# Patient Record
Sex: MALE | Race: WHITE | HISPANIC OR LATINO | Employment: FULL TIME | URBAN - METROPOLITAN AREA
[De-identification: names, ages, dates, MRNs, and addresses within clinical notes are randomized per-mention and may not be internally consistent; named-entity substitution may affect disease eponyms.]

---

## 2015-01-30 LAB
CHOLEST SERPL-MCNC: 183 MG/DL (ref 0–199)
HDLC SERPL-MCNC: 29 MG/DL
LDLC SERPL CALC-MCNC: 110 MG/DL (ref 0–129)
NONHDLC SERPL-MCNC: 154 MG/DL
TRIGL SERPL-MCNC: 218 MG/DL (ref 0–149)

## 2015-07-09 LAB — HBA1C MFR BLD: 6.9 % (ref 4.3–5.6)

## 2017-01-01 ENCOUNTER — TRANSFERRED RECORDS (OUTPATIENT)
Dept: HEALTH INFORMATION MANAGEMENT | Facility: CLINIC | Age: 32
End: 2017-01-01

## 2017-01-31 ENCOUNTER — OFFICE VISIT (OUTPATIENT)
Dept: FAMILY MEDICINE | Facility: CLINIC | Age: 32
End: 2017-01-31
Payer: COMMERCIAL

## 2017-01-31 VITALS
TEMPERATURE: 101.3 F | HEIGHT: 71 IN | BODY MASS INDEX: 29.54 KG/M2 | OXYGEN SATURATION: 96 % | WEIGHT: 211 LBS | DIASTOLIC BLOOD PRESSURE: 87 MMHG | SYSTOLIC BLOOD PRESSURE: 138 MMHG | HEART RATE: 122 BPM

## 2017-01-31 DIAGNOSIS — J45.30 MILD PERSISTENT ASTHMA WITHOUT COMPLICATION: ICD-10-CM

## 2017-01-31 DIAGNOSIS — J02.0 ACUTE STREPTOCOCCAL PHARYNGITIS: Primary | ICD-10-CM

## 2017-01-31 DIAGNOSIS — R07.0 THROAT PAIN: ICD-10-CM

## 2017-01-31 LAB
DEPRECATED S PYO AG THROAT QL EIA: ABNORMAL
MICRO REPORT STATUS: ABNORMAL
SPECIMEN SOURCE: ABNORMAL

## 2017-01-31 PROCEDURE — 99213 OFFICE O/P EST LOW 20 MIN: CPT | Performed by: PREVENTIVE MEDICINE

## 2017-01-31 PROCEDURE — 87880 STREP A ASSAY W/OPTIC: CPT | Performed by: PREVENTIVE MEDICINE

## 2017-01-31 RX ORDER — AMOXICILLIN 875 MG
875 TABLET ORAL 2 TIMES DAILY
Qty: 20 TABLET | Refills: 0 | Status: SHIPPED | OUTPATIENT
Start: 2017-01-31 | End: 2017-03-20

## 2017-01-31 RX ORDER — MONTELUKAST SODIUM 10 MG/1
10 TABLET ORAL AT BEDTIME
Qty: 90 TABLET | Refills: 1 | Status: SHIPPED | OUTPATIENT
Start: 2017-01-31 | End: 2018-06-01

## 2017-01-31 RX ORDER — ALBUTEROL SULFATE 90 UG/1
2 AEROSOL, METERED RESPIRATORY (INHALATION) EVERY 6 HOURS PRN
Qty: 1 INHALER | Refills: 3 | Status: SHIPPED | OUTPATIENT
Start: 2017-01-31 | End: 2017-07-30

## 2017-01-31 ASSESSMENT — PAIN SCALES - GENERAL: PAINLEVEL: SEVERE PAIN (7)

## 2017-01-31 NOTE — MR AVS SNAPSHOT
After Visit Summary   1/31/2017    Tio Merino    MRN: [id number]           Patient Information     Date Of Birth          1985        Visit Information        Provider Department      1/31/2017 1:20 PM Shruthi Sanchez MD [facility]        Today's Diagnoses     Acute streptococcal pharyngitis    -  1     Throat pain         Mild persistent asthma without complication           Care Instructions      Pharyngitis: Strep (Confirmed)     You have had a positive test for strep throat. Strep throat is a contagious illness. It is spread by coughing, kissing or by touching others after touching your mouth or nose. Symptoms include throat pain which is worse with swallowing, aching all over, headache and fever. It is treated with antibiotic medication. This should help you start to feel better within 1-2 days.  Home care    Rest at home. Drink plenty of fluids to avoid dehydration.    No work or school for the first 2 days of taking the antibiotics. After this time, you will not be contagious. You can then return to school or work if you are feeling better.     The antibiotic medication must be taken for the full 10 days, even if you feel better. This is very important to ensure the infection is treated. It is also important to prevent drug-resistent organisms from developing. If you were given an antibiotic shot, no more antibiotics are needed.    You may use acetaminophen (Tylenol) or ibuprofen (Motrin, Advil) to control pain or fever, unless another medicine was prescribed for this. (NOTE: If you have chronic liver or kidney disease or ever had a stomach ulcer or GI bleeding, talk with your doctor before using these medicines.)    Throat lozenges or sprays (such as Chloraseptic) help reduce pain. Gargling with warm salt water will also reduce throat pain. Dissolve 1/2 teaspoon of salt in 1 glass of warm water. This may be useful just before meals.     Soft foods are okay. Avoid  salty or spicy foods.  Follow-up care  Follow up with your healthcare provider or our staff if you are not improving over the next week.  When to seek medical advice  Call your healthcare provider right away if any of these occur:    Fever as directed by your doctor     New or worsening ear pain, sinus pain, or headache    Painful lumps in the back of neck    Stiff neck    Lymph nodes are getting larger or becoming soft in the middle    Inability to swallow liquids, excessive drooling, or inability to open mouth wide due to throat pain    Signs of dehydration (very dark urine or no urine, sunken eyes, dizziness)    Trouble breathing or noisy breathing    Muffled voice    New rash    7469-8686 [employer]. [address] [place] [address]. All rights reserved. This information is not intended as a substitute for professional medical care. Always follow your healthcare professional's instructions.              Follow-ups after your visit        Who to contact     If you have questions or need follow up information about today's clinic visit or your schedule please contact [facility] directly at [phone].  Normal or non-critical lab and imaging results will be communicated to you by Pelagohart, letter or phone within 4 business days after the clinic has received the results. If you do not hear from us within 7 days, please contact the clinic through INVERMARTt or phone. If you have a critical or abnormal lab result, we will notify you by phone as soon as possible.  Submit refill requests through RealTravel or call your pharmacy and they will forward the refill request to us. Please allow 3 business days for your refill to be completed.          Additional Information About Your Visit        RealTravel Information     RealTravel lets you send messages to your doctor, view your test results, renew your prescriptions, schedule appointments and more. To sign up, go to  [email or web address] . Click on \"Log in\" on the left side of the screen, which will take you to the Welcome page. Then click on \"Sign up Now\" on the right side of the page.     You will be asked to enter the access code listed below, as well as some personal information. Please follow the directions to create your username and password.     Your access code is: [email or web address]  Expires: 2017  1:41 PM     Your access code will  in 90 days. If you need help or a new code, please call your [place] clinic or [phone].        Care EveryWhere ID     This is your Care EveryWhere ID. This could be used by other organizations to access your [place] medical records  [id number]        Your Vitals Were     Pulse Temperature Height BMI (Body Mass Index) Pulse Oximetry       122 101.3  F (38.5  C) (Oral) 5' 10.5\" (1.791 m) 29.84 kg/m2 96%        Blood Pressure from Last 3 Encounters:   17 138/87   12 137/81    Weight from Last 3 Encounters:   17 211 lb (95.709 kg)   12 210 lb (95.255 kg)              We Performed the Following     Strep, Rapid Screen          Today's Medication Changes          These changes are accurate as of: 17  1:41 PM.  If you have any questions, ask your nurse or doctor.               Start taking these medicines.        Dose/Directions    albuterol 108 (90 BASE) MCG/ACT Inhaler   Commonly known as:  PROAIR HFA/PROVENTIL HFA/VENTOLIN HFA   Used for:  Mild persistent asthma without complication   Started by:  Shruthi Sanchez MD        Dose:  2 puff   Inhale 2 puffs into the lungs every 6 hours as needed for shortness of breath / dyspnea or wheezing   Quantity:  1 Inhaler   Refills:  3       amoxicillin 875 MG tablet   Commonly known as:  AMOXIL   Used for:  Acute streptococcal pharyngitis   Started by:  Shruthi Sanchez MD        Dose:  875 mg   Take 1 tablet (875 mg) by mouth 2 times daily   Quantity:  20 tablet   Refills:  0       montelukast 10 MG tablet "   Commonly known as:  SINGULAIR   Used for:  Mild persistent asthma without complication   Started by:  Shruthi Sanchez MD        Dose:  10 mg   Take 1 tablet (10 mg) by mouth At Bedtime   Quantity:  90 tablet   Refills:  1         These medicines have changed or have updated prescriptions.        Dose/Directions    mometasone-formoterol 100-5 MCG/ACT oral inhaler   Commonly known as:  DULERA   This may have changed:  additional instructions   Used for:  Mild persistent asthma without complication   Changed by:  Shruthi Sanchez MD        Dose:  2 puff   Inhale 2 puffs into the lungs 2 times daily   Quantity:  1 Inhaler   Refills:  3            Where to get your medicines      These medications were sent to [id number] - [facility] [address] [facility]  [address] [facility] [address]    Hours:  24-hours Phone:  [phone]    - albuterol 108 (90 BASE) MCG/ACT Inhaler  - amoxicillin 875 MG tablet  - mometasone-formoterol 100-5 MCG/ACT oral inhaler  - montelukast 10 MG tablet             Primary Care Provider Office Phone # Fax #    Aguilar Carroll [phone]       [facility] [address] [facility] [address]        Thank you!     Thank you for choosing [facility]  for your care. Our goal is always to provide you with excellent care. Hearing back from our patients is one way we can continue to improve our services. Please take a few minutes to complete the written survey that you may receive in the mail after your visit with us. Thank you!             Your Updated Medication List - Protect others around you: Learn how to safely use, store and throw away your medicines at www.disposemymeds.org.          This list is accurate as of: 1/31/17  1:41 PM.  Always use your most recent med list.                   Brand Name Dispense Instructions for use    albuterol 108 (90 BASE) MCG/ACT Inhaler     PROAIR HFA/PROVENTIL HFA/VENTOLIN HFA    1 Inhaler    Inhale 2 puffs into the lungs every 6 hours as needed for shortness of breath / dyspnea or wheezing       albuterol 90 MCG/ACT inhaler      Inhale 2 puffs into the lungs every 4 hours as needed. for asthma symptoms.       amoxicillin 875 MG tablet    AMOXIL    20 tablet    Take 1 tablet (875 mg) by mouth 2 times daily       mometasone-formoterol 100-5 MCG/ACT oral inhaler    DULERA    1 Inhaler    Inhale 2 puffs into the lungs 2 times daily       montelukast 10 MG tablet    SINGULAIR    90 tablet    Take 1 tablet (10 mg) by mouth At Bedtime

## 2017-01-31 NOTE — Clinical Note
[address] [address]  [phone]  Dept: [phone]      1/31/2017    Re: Tio Merino      TO WHOM IT MAY CONCERN:    Tio Merino  was seen on 1/31/17.  Please excuse him  until 2/2/17 due to illness.    Cordially,          Shruthi Sanchez MD MPH    [facility]

## 2017-01-31 NOTE — PROGRESS NOTES
SUBJECTIVE:                                                    Tio Merino is a 31 year old male who presents to clinic today for the following health issues:  I have reviewed and agree with the documentation by the MA. I updated the history as indicated.  Shruthi Sanchez MD MPH      RESPIRATORY SYMPTOMS      Duration: x last night    Description  sore throat, fever, chills, headache, fatigue/malaise, hoarse voice, myalgias and nausea    Severity: severe    Accompanying signs and symptoms: None    History (predisposing factors):  none    Precipitating or alleviating factors: None    Therapies tried and outcome:  OTC flu   No rash, no diarrhea and no abdominal pain.  Works in a     Problem list and histories reviewed & adjusted, as indicated.  Additional history: as documented    Patient Active Problem List   Diagnosis     CARDIOVASCULAR SCREENING; LDL GOAL LESS THAN 160     Mild persistent asthma     History reviewed. No pertinent past surgical history.    Social History   Substance Use Topics     Smoking status: Never Smoker      Smokeless tobacco: Never Used     Alcohol Use: Yes     Family History   Problem Relation Age of Onset     DIABETES Maternal Grandfather          Current Outpatient Prescriptions   Medication Sig Dispense Refill     mometasone-formoterol (DULERA) 100-5 MCG/ACT oral inhaler Inhale 2 puffs into the lungs 2 times daily 1 Inhaler 3     montelukast (SINGULAIR) 10 MG tablet Take 1 tablet (10 mg) by mouth At Bedtime 90 tablet 1     albuterol (PROAIR HFA/PROVENTIL HFA/VENTOLIN HFA) 108 (90 BASE) MCG/ACT Inhaler Inhale 2 puffs into the lungs every 6 hours as needed for shortness of breath / dyspnea or wheezing 1 Inhaler 3     amoxicillin (AMOXIL) 875 MG tablet Take 1 tablet (875 mg) by mouth 2 times daily 20 tablet 0     albuterol 90 MCG/ACT inhaler Inhale 2 puffs into the lungs every 4 hours as needed. for asthma symptoms.       [DISCONTINUED] mometasone furo-formoterol fum (DULERA)  "100-5 MCG/ACT AERO Inhale 2 puffs into the lungs 2 times daily. for asthma prevention. Rinse mouth after use. 1 Inhaler 2     No Known Allergies  BP Readings from Last 3 Encounters:   01/31/17 138/87   06/18/12 137/81    Wt Readings from Last 3 Encounters:   01/31/17 211 lb (95.709 kg)   06/18/12 210 lb (95.255 kg)                    ROS:  Constitutional, HEENT, cardiovascular, pulmonary, gi and gu systems are negative, except as otherwise noted.    OBJECTIVE:                                                    /87 mmHg  Pulse 122  Temp(Src) 101.3  F (38.5  C) (Oral)  Ht 5' 10.5\" (1.791 m)  Wt 211 lb (95.709 kg)  BMI 29.84 kg/m2  SpO2 96%  Body mass index is 29.84 kg/(m^2).  GENERAL APPEARANCE: healthy, alert and no distress  EYES: Eyes grossly normal to inspection and conjunctivae and sclerae normal  HENT: ear canals and TM's normal, nose and mouth without ulcers or lesions, erythema of pharynx+, no exudates or pus points, no uvular deviation   NECK: Anterior cervical chain adenopathy+  RESP: lungs clear to auscultation - no rales, rhonchi or wheezes  CV: regular rates and rhythm, normal S1 S2, no S3 or S4 and no murmur, click or rub  ABDOMEN: soft, nontender, without hepatosplenomegaly or masses  SKIN: no suspicious lesions or rashes  NEURO: Normal strength and tone, mentation intact and speech normal  PSYCH: mentation appears normal    Diagnostic test results:  Diagnostic Test Results:  Results for orders placed or performed in visit on 01/31/17 (from the past 24 hour(s))   Strep, Rapid Screen   Result Value Ref Range    Specimen Description Throat     Rapid Strep A Screen (A)      POSITIVE: Group A Streptococcal antigen detected by immunoassay.    Micro Report Status FINAL 01/31/2017         ASSESSMENT/PLAN:                                                    1. Acute streptococcal pharyngitis  - amoxicillin (AMOXIL) 875 MG tablet; Take 1 tablet (875 mg) by mouth 2 times daily  Dispense: 20 tablet; " "Refill: 0  -Hand washing  -Work note provided   -Home care information provided  -Hydration and monitor temperature  -Saline gargles  -tylenol or Ibuprofen as needed   -Contagious nature discussed   -ER precautions, if drooling, persistent fever then needs to be seen in ER due to risk of sudarshan tonsillar abscess.   -Complete full course of antibiotics  -Will not be contagious after 24 hours of antibiotics       2. Throat pain  - Strep, Rapid Screen    3. Mild persistent asthma without complication  - mometasone-formoterol (DULERA) 100-5 MCG/ACT oral inhaler; Inhale 2 puffs into the lungs 2 times daily  Dispense: 1 Inhaler; Refill: 3  - montelukast (SINGULAIR) 10 MG tablet; Take 1 tablet (10 mg) by mouth At Bedtime  Dispense: 90 tablet; Refill: 1  - albuterol (PROAIR HFA/PROVENTIL HFA/VENTOLIN HFA) 108 (90 BASE) MCG/ACT Inhaler; Inhale 2 puffs into the lungs every 6 hours as needed for shortness of breath / dyspnea or wheezing  Dispense: 1 Inhaler; Refill: 3  -ACT score today is 11, per patient \"score is always like this\"  -Added inhaled steroid and Singulair  -Recheck ACT by phone in 4 weeks     I ended our visit today by discussing the patient's diagnoses and recommended treatment. Please refer to today's diagnoses and orders for further details. I briefly discussed the pathophysiology of these conditions and outlined their expected course. I discussed the warning symptoms and signs that indicate an atypical course that would need urgent or emergent care. I also discussed self care strategies for symptom relief.  Patient voiced complete understanding of plan of care and was in full agreement to proceed. After visit summary discussed and handed to patient.    Common side effects of medications prescribed at this visit were discussed with the patient. Severe side effects, including current applicable black box warnings, were discussed.   Follow up with Provider - If sore throat not improving in 48 hours.      Shruthi " MD Daniel MPH    [facility]

## 2017-01-31 NOTE — PATIENT INSTRUCTIONS
Pharyngitis: Strep (Confirmed)     You have had a positive test for strep throat. Strep throat is a contagious illness. It is spread by coughing, kissing or by touching others after touching your mouth or nose. Symptoms include throat pain which is worse with swallowing, aching all over, headache and fever. It is treated with antibiotic medication. This should help you start to feel better within 1-2 days.  Home care    Rest at home. Drink plenty of fluids to avoid dehydration.    No work or school for the first 2 days of taking the antibiotics. After this time, you will not be contagious. You can then return to school or work if you are feeling better.     The antibiotic medication must be taken for the full 10 days, even if you feel better. This is very important to ensure the infection is treated. It is also important to prevent drug-resistent organisms from developing. If you were given an antibiotic shot, no more antibiotics are needed.    You may use acetaminophen (Tylenol) or ibuprofen (Motrin, Advil) to control pain or fever, unless another medicine was prescribed for this. (NOTE: If you have chronic liver or kidney disease or ever had a stomach ulcer or GI bleeding, talk with your doctor before using these medicines.)    Throat lozenges or sprays (such as Chloraseptic) help reduce pain. Gargling with warm salt water will also reduce throat pain. Dissolve 1/2 teaspoon of salt in 1 glass of warm water. This may be useful just before meals.     Soft foods are okay. Avoid salty or spicy foods.  Follow-up care  Follow up with your healthcare provider or our staff if you are not improving over the next week.  When to seek medical advice  Call your healthcare provider right away if any of these occur:    Fever as directed by your doctor     New or worsening ear pain, sinus pain, or headache    Painful lumps in the back of neck    Stiff neck    Lymph nodes are getting larger or becoming soft in the  middle    Inability to swallow liquids, excessive drooling, or inability to open mouth wide due to throat pain    Signs of dehydration (very dark urine or no urine, sunken eyes, dizziness)    Trouble breathing or noisy breathing    Muffled voice    New rash    [id number] [employer]. [address] [place] [address]. All rights reserved. This information is not intended as a substitute for professional medical care. Always follow your healthcare professional's instructions.

## 2017-01-31 NOTE — NURSING NOTE
"Chief Complaint   Patient presents with     Pharyngitis       Initial /87 mmHg  Pulse 122  Temp(Src) 101.3  F (38.5  C) (Oral)  Ht 5' 10.5\" (1.791 m)  Wt 211 lb (95.709 kg)  BMI 29.84 kg/m2  SpO2 96% Estimated body mass index is 29.84 kg/(m^2) as calculated from the following:    Height as of this encounter: 5' 10.5\" (1.791 m).    Weight as of this encounter: 211 lb (95.709 kg).  BP completed using cuff size: brielle Delcid MA        "

## 2017-01-31 NOTE — PROGRESS NOTES
Quick Note:    Results discussed directly with patient while patient was present. Any further details documented in the note.   Shruthi Sanchez MD  ______

## 2017-02-01 ASSESSMENT — ASTHMA QUESTIONNAIRES: ACT_TOTALSCORE: 11

## 2017-02-07 ENCOUNTER — TELEPHONE (OUTPATIENT)
Dept: FAMILY MEDICINE | Facility: CLINIC | Age: 32
End: 2017-02-07

## 2017-02-07 DIAGNOSIS — J45.30 MILD PERSISTENT ASTHMA: Primary | ICD-10-CM

## 2017-02-07 NOTE — Clinical Note
My Asthma Action Plan  Name: Tio Merino   YOB: 1985  Date: 2/7/2017   My doctor: Aguilar Carroll   My clinic: [facility]      My Control Medicine: Mometasone+formoterol (Dulera) -  100/5 mcg  Montelukast (Singulair) -  10 mg        Dose: once daily  My Rescue Medicine: Albuterol (Proair/Ventolin/Proventil) HFA        Dose: Inhale 2 puffs into the lungs every 6 hours as needed for shortness of breath / dyspnea or wheezing   My Asthma Severity: mild persistent  Avoid your asthma triggers: please see the second sheet.        GREEN ZONE   Good Control    I feel good    No cough or wheeze    Can work, sleep and play without asthma symptoms       Take your asthma control medicine every day.     1. If exercise triggers your asthma, take your rescue medication    15 minutes before exercise or sports, and    During exercise if you have asthma symptoms  2. Spacer to use with inhaler: If you have a spacer, make sure to use it with your inhaler             YELLOW ZONE Getting Worse  I have ANY of these:    I do not feel good    Cough or wheeze    Chest feels tight    Wake up at night   1. Keep taking your Green Zone medications  2. Start taking your rescue medicine:    every 20 minutes for up to 1 hour. Then every 4 hours for 24-48 hours.  3. If you stay in the Yellow Zone for more than 12-24 hours, contact your doctor.  4. If you do not return to the Green Zone in 12-24 hours or you get worse, start taking your oral steroid medicine if prescribed by your provider.           RED ZONE Medical Alert - Get Help  I have ANY of these:    I feel awful    Medicine is not helping    Breathing getting harder    Trouble walking or talking    Nose opens wide to breathe       1. Take your rescue medicine NOW  2. If your provider has prescribed an oral steroid medicine, start taking it NOW  3. Call your doctor NOW  4. If you are still in the Red Zone after 20 minutes and you have not reached your  doctor:    Take your rescue medicine again and    Call 911 or go to the emergency room right away    See your regular doctor within 2 weeks of an Emergency Room or Urgent Care visit for follow-up treatment.        The above medication may be given at school or day care?:  N/A (Adult Patient)  Child can carry and use inhaler(s) at school with approval of school nurse?: N/A (Adult Patient)    Electronically signed by: Danyell Chung, February 7, 2017    Annual Reminders:  Meet with Asthma Educator,  Flu Shot in the Fall, consider Pneumonia Vaccination for patients with asthma (aged 19 and older).    Pharmacy:    [id number] - [facility], [place] - [address] [id number] - [place] [[place]], [place] - [address] [facility] [address] [address] [facility] [address] [facility] [address] [facility] [address] [facility]                    Asthma Triggers  How To Control Things That Make Your Asthma Worse    Triggers are things that make your asthma worse.  Look at the list below to help you find your triggers and what you can do about them.  You can help prevent asthma flare-ups by staying away from your triggers.      Trigger                                                          What you can do   Cigarette Smoke  Tobacco smoke can make asthma worse. Do not allow smoking in your home, car or around you.  Be sure no one smokes at a child s day care or school.  If you smoke, ask your health care provider for ways to help you quit.  Ask family members to quit too.  Ask your health care provider for a referral to Quit Plan to help you quit smoking, or call 8-130-362-PLAN.     Colds, Flu, Bronchitis  These are common triggers of asthma. Wash your hands often.  Don t touch your eyes, nose or mouth.  Get a flu shot every year.     Dust Mites  These are tiny bugs that live in cloth or carpet. They are too small to see. Wash sheets and blankets in hot water every week.   Encase pillows and mattress in  dust mite proof covers.  Avoid having carpet if you can. If you have carpet, vacuum weekly.   Use a dust mask and HEPA vacuum.   Pollen and Outdoor Mold  Some people are allergic to trees, grass, or weed pollen, or molds. Try to keep your windows closed.  Limit time out doors when pollen count is high.   Ask you health care provider about taking medicine during allergy season.     Animal Dander  Some people are allergic to skin flakes, urine or saliva from pets with fur or feathers. Keep pets with fur or feathers out of your home.    If you can t keep the pet outdoors, then keep the pet out of your bedroom.  Keep the bedroom door closed.  Keep pets off cloth furniture and away from stuffed toys.     Mice, Rats, and Cockroaches  Some people are allergic to the waste from these pests.   Cover food and garbage.  Clean up spills and food crumbs.  Store grease in the refrigerator.   Keep food out of the bedroom.   Indoor Mold  This can be a trigger if your home has high moisture. Fix leaking faucets, pipes, or other sources of water.   Clean moldy surfaces.  Dehumidify basement if it is damp and smelly.   Smoke, Strong Odors, and Sprays  These can reduce air quality. Stay away from strong odors and sprays, such as perfume, powder, hair spray, paints, smoke incense, paint, cleaning products, candles and new carpet.   Exercise or Sports  Some people with asthma have this trigger. Be active!  Ask your doctor about taking medicine before sports or exercise to prevent symptoms.    Warm up for 5-10 minutes before and after sports or exercise.     Other Triggers of Asthma  Cold air:  Cover your nose and mouth with a scarf.  Sometimes laughing or crying can be a trigger.  Some medicines and food can trigger asthma.

## 2017-02-07 NOTE — TELEPHONE ENCOUNTER
Order place and asthma action plan completed and mailed to the patient.    Danyell Chung RN, [facility]

## 2017-02-28 ENCOUNTER — TELEPHONE (OUTPATIENT)
Dept: FAMILY MEDICINE | Facility: CLINIC | Age: 32
End: 2017-02-28

## 2017-03-03 NOTE — TELEPHONE ENCOUNTER
Left message on voicemail to call clinic, unable to reach x 3 so encounter closed.  Farhana MILLER

## 2017-03-20 ENCOUNTER — OFFICE VISIT (OUTPATIENT)
Dept: FAMILY MEDICINE | Facility: CLINIC | Age: 32
End: 2017-03-20
Payer: COMMERCIAL

## 2017-03-20 VITALS
TEMPERATURE: 98 F | WEIGHT: 214.7 LBS | DIASTOLIC BLOOD PRESSURE: 86 MMHG | HEART RATE: 67 BPM | OXYGEN SATURATION: 97 % | SYSTOLIC BLOOD PRESSURE: 130 MMHG | BODY MASS INDEX: 30.37 KG/M2

## 2017-03-20 DIAGNOSIS — K62.5 RECTAL BLEEDING: ICD-10-CM

## 2017-03-20 DIAGNOSIS — K64.8 HEMORRHOIDS, INTERNAL: Primary | ICD-10-CM

## 2017-03-20 PROCEDURE — 99213 OFFICE O/P EST LOW 20 MIN: CPT | Performed by: FAMILY MEDICINE

## 2017-03-20 RX ORDER — HYDROCORTISONE ACETATE 25 MG/1
25 SUPPOSITORY RECTAL 2 TIMES DAILY
Qty: 28 SUPPOSITORY | Refills: 0 | Status: SHIPPED | OUTPATIENT
Start: 2017-03-20 | End: 2017-03-22

## 2017-03-20 NOTE — NURSING NOTE
"Chief Complaint   Patient presents with     Bowel Problems     x 1 month       Initial BP (!) 146/92 (BP Location: Right arm, Cuff Size: Adult Large)  Pulse 67  Temp 98  F (36.7  C) (Oral)  Wt 97.4 kg (214 lb 11.2 oz)  SpO2 97%  BMI 30.37 kg/m2 Estimated body mass index is 30.37 kg/(m^2) as calculated from the following:    Height as of 1/31/17: 1.791 m (5' 10.5\").    Weight as of this encounter: 97.4 kg (214 lb 11.2 oz).  Medication Reconciliation: complete       Hemal Reynaga CMA      "

## 2017-03-20 NOTE — PROGRESS NOTES
SUBJECTIVE:                                                    Tio Merino is a 31 year old male who presents to clinic today for the following health issues:      Bowel Issues     Onset: 1 month    Description:   Frequency of bowel movements: 3 per day.  Stool consistency: soft formed    Progression of Symptoms:  worsening    Accompanying Signs & Symptoms:  Abdominal pain (cramping?): no pain or cramping but feeling bloated and uncomfortable   Blood in stool: YES and mucous - combo.    Rectal pain: no   Nausea/vomiting: no   Weight loss or gain: no    History:   History of abdominal surgery: no     Precipitating factors:   Recent use of narcotics, anticholinergics, calcium channel blockers, antacids, or iron supplements: no   Chronic Laxative Use: no          Therapies Tried and outcome: none    Will have sense of needing to pass stool but nothing there.    Seems like blood is on the surface of stool.  No pain with passing BM.  No incontinence.          Problem list and histories reviewed & adjusted, as indicated.  Additional history: as documented    BP Readings from Last 3 Encounters:   03/20/17 (!) 146/92   01/31/17 138/87   06/18/12 137/81    Wt Readings from Last 3 Encounters:   03/20/17 97.4 kg (214 lb 11.2 oz)   01/31/17 95.7 kg (211 lb)   06/18/12 95.3 kg (210 lb)                    Reviewed and updated as needed this visit by clinical staff  Tobacco  Allergies  Meds  Med Hx  Surg Hx  Fam Hx  Soc Hx      Reviewed and updated as needed this visit by Provider  Tobacco  Allergies  Meds  Med Hx  Surg Hx  Fam Hx  Soc Hx        ROS:  Constitutional, HEENT, cardiovascular, pulmonary, gi and gu systems are negative, except as otherwise noted.    OBJECTIVE:                                                    /86  Pulse 67  Temp 98  F (36.7  C) (Oral)  Wt 97.4 kg (214 lb 11.2 oz)  SpO2 97%  BMI 30.37 kg/m2  Body mass index is 30.37 kg/(m^2).  GENERAL: alert and no distress  NECK: no  adenopathy, no asymmetry, masses, or scars and thyroid normal to palpation  RESP: lungs clear to auscultation - no rales, rhonchi or wheezes  CV: regular rate and rhythm, normal S1 S2, no S3 or S4, no murmur, click or rub, no peripheral edema and peripheral pulses strong  ABDOMEN: soft, nontender, no hepatosplenomegaly, no masses and bowel sounds normal  RECTAL (male): normal sphincter tone, no rectal masses, prostate of normal size for age, smooth, nontender without masses/nodules and internal hemorrhoid viewed with anoscope         ASSESSMENT/PLAN:                                                            1. Hemorrhoids, internal  Formulary issues with the suppositories - anusol-HC 2.5% cream.  Twice a day use with applicator     2. Rectal bleeding  I would recommend colonoscopy due to the recurrent nature of the bleeding to verify no other cause for these symptoms.  - GASTROENTEROLOGY ADULT REF PROCEDURE ONLY    Patient Instructions   Begin Anucort suppository in rectum twice a day for 2 weeks.  Referral for colonoscopy evaluation due to recurrent and prolonged symptoms    Return to clinic in 1-3 weeks for fasting labs, follow up blood sugar, cholesterol and blood pressure.        Bridget Mohr MD  [facility]

## 2017-03-20 NOTE — PATIENT INSTRUCTIONS
Begin Anucort suppository in rectum twice a day for 2 weeks.  Referral for colonoscopy evaluation due to recurrent and prolonged symptoms    Return to clinic in 1-3 weeks for fasting labs, follow up blood sugar, cholesterol and blood pressure.

## 2017-03-20 NOTE — MR AVS SNAPSHOT
After Visit Summary   3/20/2017    Tio Merino    MRN: [id number]           Patient Information     Date Of Birth          1985        Visit Information        Provider Department      3/20/2017 5:20 PM Bridget Mohr MD [facility]        Today's Diagnoses     Hemorrhoids, internal    -  1    Rectal bleeding          Care Instructions    Begin Anucort suppository in rectum twice a day for 2 weeks.  Referral for colonoscopy evaluation due to recurrent and prolonged symptoms    Return to clinic in 1-3 weeks for fasting labs, follow up blood sugar, cholesterol and blood pressure.          Follow-ups after your visit        Additional Services     GASTROENTEROLOGY ADULT REF PROCEDURE ONLY       Last Lab Result: No results found for: CR  Body mass index is 30.37 kg/(m^2).     Needed:  No  Language:  English    Patient will be contacted to schedule procedure.     Please be aware that coverage of these services is subject to the terms and limitations of your health insurance plan.  Call member services at your health plan with any benefit or coverage questions.  Any procedures must be performed at a [facility] facility OR coordinated by your clinic's referral office.    Please bring the following with you to your appointment:    (1) Any X-Rays, CTs or MRIs which have been performed.  Contact the facility where they were done to arrange for  prior to your scheduled appointment.    (2) List of current medications   (3) This referral request   (4) Any documents/labs given to you for this referral                  Who to contact     If you have questions or need follow up information about today's clinic visit or your schedule please contact [facility] directly at [phone].  Normal or non-critical lab and imaging results will be communicated to you by MyChart, letter or phone within 4 business days after the clinic has received the results. If you  "do not hear from us within 7 days, please contact the clinic through txtr or phone. If you have a critical or abnormal lab result, we will notify you by phone as soon as possible.  Submit refill requests through txtr or call your pharmacy and they will forward the refill request to us. Please allow 3 business days for your refill to be completed.          Additional Information About Your Visit        [employer] Information     txtr lets you send messages to your doctor, view your test results, renew your prescriptions, schedule appointments and more. To sign up, go to [email or web address] . Click on \"Log in\" on the left side of the screen, which will take you to the Welcome page. Then click on \"Sign up Now\" on the right side of the page.     You will be asked to enter the access code listed below, as well as some personal information. Please follow the directions to create your username and password.     Your access code is: [email or web address]  Expires: 2017  2:41 PM     Your access code will  in 90 days. If you need help or a new code, please call your [facility] clinic or [phone].        Care EveryWhere ID     This is your Care EveryWhere ID. This could be used by other organizations to access your [facility] medical records  [id number]        Your Vitals Were     Pulse Temperature Pulse Oximetry BMI (Body Mass Index)          67 98  F (36.7  C) (Oral) 97% 30.37 kg/m2         Blood Pressure from Last 3 Encounters:   17 (!) 146/92   17 138/87   12 137/81    Weight from Last 3 Encounters:   17 97.4 kg (214 lb 11.2 oz)   17 95.7 kg (211 lb)   12 95.3 kg (210 lb)              We Performed the Following     GASTROENTEROLOGY ADULT REF PROCEDURE ONLY          Today's Medication Changes          These changes are accurate as of: 3/20/17  6:00 PM.  If you have any questions, ask your nurse or doctor.               Start taking these medicines.        Dose/Directions    " hydrocortisone 25 MG Suppository   Commonly known as:  ANUSOL-HC   Used for:  Hemorrhoids, internal   Started by:  Bridget Mohr MD        Dose:  25 mg   Place 1 suppository (25 mg) rectally 2 times daily   Quantity:  28 suppository   Refills:  0            Where to get your medicines      These medications were sent to [facility]/pharmacy #[id number] - [place], [place] - [address], [place] [address] [facility]  [address], [facility] [address]     Phone:  [phone]     hydrocortisone 25 MG Suppository                Primary Care Provider Office Phone # Fax #    Aguilar Carroll [phone]       [facility] [address]  [facility] [address]        Thank you!     Thank you for choosing [facility]  for your care. Our goal is always to provide you with excellent care. Hearing back from our patients is one way we can continue to improve our services. Please take a few minutes to complete the written survey that you may receive in the mail after your visit with us. Thank you!             Your Updated Medication List - Protect others around you: Learn how to safely use, store and throw away your medicines at www.disposemymeds.org.          This list is accurate as of: 3/20/17  6:00 PM.  Always use your most recent med list.                   Brand Name Dispense Instructions for use    albuterol 108 (90 BASE) MCG/ACT Inhaler    PROAIR HFA/PROVENTIL HFA/VENTOLIN HFA    1 Inhaler    Inhale 2 puffs into the lungs every 6 hours as needed for shortness of breath / dyspnea or wheezing       albuterol 90 MCG/ACT inhaler      Inhale 2 puffs into the lungs every 4 hours as needed. for asthma symptoms.       hydrocortisone 25 MG Suppository    ANUSOL-HC    28 suppository    Place 1 suppository (25 mg) rectally 2 times daily       mometasone-formoterol 100-5 MCG/ACT oral inhaler    DULERA    1 Inhaler    Inhale 2 puffs into the lungs 2 times daily        montelukast 10 MG tablet    SINGULAIR    90 tablet    Take 1 tablet (10 mg) by mouth At Bedtime

## 2017-03-23 ENCOUNTER — DOCUMENTATION ONLY (OUTPATIENT)
Dept: LAB | Facility: CLINIC | Age: 32
End: 2017-03-23

## 2017-03-23 DIAGNOSIS — Z13.1 SCREENING FOR DIABETES MELLITUS: ICD-10-CM

## 2017-03-23 DIAGNOSIS — Z13.6 CARDIOVASCULAR SCREENING; LDL GOAL LESS THAN 160: Primary | ICD-10-CM

## 2017-03-23 NOTE — PROGRESS NOTES
This patient has a lab only appointment on 3/31/2017 but does not have future orders. Please review, associate diagnosis and sign pending lab orders for the upcoming appointment.  There are no future scheduled appointments with you at this time.      Thank you,    [facility]

## 2017-09-11 ENCOUNTER — OFFICE VISIT (OUTPATIENT)
Dept: FAMILY MEDICINE | Facility: CLINIC | Age: 32
End: 2017-09-11
Payer: COMMERCIAL

## 2017-09-11 ENCOUNTER — TELEPHONE (OUTPATIENT)
Dept: FAMILY MEDICINE | Facility: CLINIC | Age: 32
End: 2017-09-11

## 2017-09-11 VITALS
SYSTOLIC BLOOD PRESSURE: 136 MMHG | BODY MASS INDEX: 30.83 KG/M2 | WEIGHT: 220.2 LBS | DIASTOLIC BLOOD PRESSURE: 92 MMHG | OXYGEN SATURATION: 97 % | HEART RATE: 69 BPM | TEMPERATURE: 97.3 F | HEIGHT: 71 IN

## 2017-09-11 DIAGNOSIS — E11.9 TYPE 2 DIABETES MELLITUS WITHOUT COMPLICATION, WITHOUT LONG-TERM CURRENT USE OF INSULIN (H): Primary | ICD-10-CM

## 2017-09-11 DIAGNOSIS — I10 HYPERTENSION, GOAL BELOW 140/90: ICD-10-CM

## 2017-09-11 DIAGNOSIS — E78.5 HYPERLIPIDEMIA LDL GOAL <100: ICD-10-CM

## 2017-09-11 DIAGNOSIS — J45.41 MODERATE PERSISTENT ASTHMA WITH ACUTE EXACERBATION: ICD-10-CM

## 2017-09-11 LAB
ALBUMIN SERPL-MCNC: 3.9 G/DL (ref 3.4–5)
ALP SERPL-CCNC: 127 U/L (ref 40–150)
ALT SERPL W P-5'-P-CCNC: 99 U/L (ref 0–70)
ANION GAP SERPL CALCULATED.3IONS-SCNC: 7 MMOL/L (ref 3–14)
AST SERPL W P-5'-P-CCNC: 33 U/L (ref 0–45)
BILIRUB SERPL-MCNC: 0.4 MG/DL (ref 0.2–1.3)
BUN SERPL-MCNC: 14 MG/DL (ref 7–30)
CALCIUM SERPL-MCNC: 9.2 MG/DL (ref 8.5–10.1)
CHLORIDE SERPL-SCNC: 103 MMOL/L (ref 94–109)
CHOLEST SERPL-MCNC: 262 MG/DL
CO2 SERPL-SCNC: 29 MMOL/L (ref 20–32)
CREAT SERPL-MCNC: 0.71 MG/DL (ref 0.66–1.25)
CREAT UR-MCNC: 218 MG/DL
GFR SERPL CREATININE-BSD FRML MDRD: >90 ML/MIN/1.7M2
GLUCOSE SERPL-MCNC: 122 MG/DL (ref 70–99)
HBA1C MFR BLD: 8 % (ref 4.3–6)
HDLC SERPL-MCNC: 34 MG/DL
LDLC SERPL CALC-MCNC: 153 MG/DL
MICROALBUMIN UR-MCNC: 30 MG/L
MICROALBUMIN/CREAT UR: 13.76 MG/G CR (ref 0–17)
NONHDLC SERPL-MCNC: 228 MG/DL
POTASSIUM SERPL-SCNC: 4 MMOL/L (ref 3.4–5.3)
PROT SERPL-MCNC: 8 G/DL (ref 6.8–8.8)
SODIUM SERPL-SCNC: 139 MMOL/L (ref 133–144)
TRIGL SERPL-MCNC: 377 MG/DL
TSH SERPL DL<=0.005 MIU/L-ACNC: 2.17 MU/L (ref 0.4–4)

## 2017-09-11 PROCEDURE — 80053 COMPREHEN METABOLIC PANEL: CPT | Performed by: NURSE PRACTITIONER

## 2017-09-11 PROCEDURE — 82043 UR ALBUMIN QUANTITATIVE: CPT | Performed by: NURSE PRACTITIONER

## 2017-09-11 PROCEDURE — 99214 OFFICE O/P EST MOD 30 MIN: CPT | Performed by: NURSE PRACTITIONER

## 2017-09-11 PROCEDURE — 80061 LIPID PANEL: CPT | Performed by: NURSE PRACTITIONER

## 2017-09-11 PROCEDURE — 83036 HEMOGLOBIN GLYCOSYLATED A1C: CPT | Performed by: NURSE PRACTITIONER

## 2017-09-11 PROCEDURE — 36415 COLL VENOUS BLD VENIPUNCTURE: CPT | Performed by: NURSE PRACTITIONER

## 2017-09-11 PROCEDURE — 84443 ASSAY THYROID STIM HORMONE: CPT | Performed by: NURSE PRACTITIONER

## 2017-09-11 RX ORDER — ATORVASTATIN CALCIUM 10 MG/1
TABLET, FILM COATED ORAL
COMMUNITY
Start: 2016-09-02 | End: 2017-09-11

## 2017-09-11 RX ORDER — GLIPIZIDE 5 MG/1
5 TABLET ORAL
COMMUNITY
End: 2017-09-11

## 2017-09-11 RX ORDER — METFORMIN HCL 500 MG
500 TABLET, EXTENDED RELEASE 24 HR ORAL
Qty: 360 TABLET | Refills: 3 | Status: SHIPPED | OUTPATIENT
Start: 2017-09-11 | End: 2017-09-13

## 2017-09-11 RX ORDER — LANCETS 30 GAUGE
EACH MISCELLANEOUS
COMMUNITY
Start: 2015-02-11 | End: 2018-06-01

## 2017-09-11 RX ORDER — ATORVASTATIN CALCIUM 10 MG/1
10 TABLET, FILM COATED ORAL DAILY
Qty: 90 TABLET | Refills: 3 | Status: SHIPPED | OUTPATIENT
Start: 2017-09-11 | End: 2018-06-01

## 2017-09-11 RX ORDER — LISINOPRIL 10 MG/1
10 TABLET ORAL DAILY
Qty: 90 TABLET | Refills: 1 | Status: SHIPPED | OUTPATIENT
Start: 2017-09-11 | End: 2018-03-15

## 2017-09-11 RX ORDER — GLIPIZIDE 5 MG/1
5 TABLET ORAL DAILY
Qty: 90 TABLET | Refills: 1 | Status: SHIPPED | OUTPATIENT
Start: 2017-09-11 | End: 2017-09-13 | Stop reason: DRUGHIGH

## 2017-09-11 RX ORDER — METFORMIN HCL 500 MG
TABLET, EXTENDED RELEASE 24 HR ORAL
COMMUNITY
Start: 2016-08-01 | End: 2018-06-01

## 2017-09-11 NOTE — PATIENT INSTRUCTIONS
Based on your medical history and these are the current health maintenance or preventive care services that you are due for (some may have been done at this visit)  Health Maintenance Due   Topic Date Due     ASTHMA CONTROL TEST Q6 MOS  07/31/2017     INFLUENZA VACCINE (SYSTEM ASSIGNED)  09/01/2017         At [facility], we strive to deliver an exceptional experience to you, every time we see you.    If you receive a survey in the mail, please send us back your thoughts. We really do value your feedback.    Your care team's suggested websites for health information:  Www.[facility].org : Up to date and easily searchable information on multiple topics.  Www.medlineplus.gov : medication info, interactive tutorials, watch real surgeries online  Www.familydoctor.org : good info from the Academy of Family Physicians  Www.cdc.gov : public health info, travel advisories, epidemics (H1N1)  Www.aap.org : children's health info, normal development, vaccinations  Www.health.[facility].[place].us : [place] dept of health, public health issues in [place], N1N1    How to contact your care team:   Soledad Stanley/Brijesh [phone]         Pharmacy [phone]    Dr. Lamb, Jenna Saldana PA-C, Dr. Sanchez, Shraddha LUKE CNP, Brittany Shankar PA-C, Dr. Banks, and MARLY Roe CNP    Team RNs: Danyell & Rita      Clinic hours  M-Th 7 am-7 pm   Fri 7 am-5 pm.   Urgent care M-F 11 am-9 pm,   Sat/Sun 9 am-5 pm.  Pharmacy M-Th 8 am-8 pm Fri 8 am-6 pm  Sat/Sun 9 am-5 pm.     All password changes, disabled accounts, or ID changes in [facility]/MyHealth will be done by our Access Services Department.    If you need help with your account or password, call: [phone]. Clinic staff no longer has the ability to change passwords.   Page 1 of 2  Dulera HFA Inhaler  Medicines: Mometasone (mzo-YEE-sq-sone) and Formoterol (for-MOH-ter-ahl)  What it does  These two medicines work to relax the muscles in your airway and help  you breath more easily. They decrease inflammation, swelling and mucus over time.   Use every day to prevent symptoms, even if you are feeling well. Do not use for fast relief.   Test Spray (priming)  Prime the inhaler if using for the first time or if not used for 5 days. Shake the inhaler. Then spray into the air 4 times (away from the face).  How to use this inhaler  1. Wash and dry your hands well.  2. Remove the mouthpiece cover. Check for loose parts inside the mouthpiece.  3. Shake the inhaler several times for about 5 seconds.  4. Sit up straight or stand up.  5. Fully exhale (breathe out) through the mouth. Hold the inhaler so the mouthpiece is at the bottom and the canister is sticking straight up.  6. Place the mouthpiece between your lips. Make sure that your tongue is flat under the mouthpiece and does not block the opening.    7. Seal your lips around the mouthpiece.  8. Push the canister all the way down as you begin to take in a deep breath through your mouth over 5 seconds.  9. Hold your breath for 10 seconds. If you cannot hold your breath for 10 seconds, then hold your breath for as long as you can.  10. If you need another dose, wait 30 seconds.  11. Repeat steps 3 to 10 until you reach the dose prescribed by your doctor.  12. Rinse your mouth after the last puff of medicine and spit the water out. Do not swallow.  13. Replace the cover after each use. Store in a cool, dry place.  This inhaler contains Formoterol, a LABA class medicine. Do not use with other LABA containing medicines.   Page 2 of 2  Cleaning  Clean the inside and outside of mouthpiece with a dry cloth or cotton swab 1 time each week. Be sure to clean the sprayer. Do not put any part of the inhaler in water.  Do not remove the cannister from the inhaler. You might not get the correct dose of medicine, and the dose counter may not work.  How to tell if the inhaler is empty  This inhaler contains 120 puffs (124 before priming). It  is empty when the dose counter reads 0 (zero).  If you have questions about the use of your inhaler, please ask your pharmacist or provider.   For more information and video demos, go to www.fpanetwork.org/inhalers.  For informational purposes only. Not to replace the advice of your health care provider.   Copyright   2013 [facility]. All rights reserved. [employer] [id number]   REV 11/16.    Eating Out When You Have Diabetes  Eating right is an important part of keeping your blood sugar in your target range. You just need to make healthy choices.    Tips for restaurant meals  When you eat away from home try these tips:    Try to schedule your dining-out meal at your normal meal time. Make a reservation if possible, so you don't have to wait to eat. If you can't make a reservation, try to arrive at the restaurant at a less-busy time to cut down your wait time. Eat a small fruit or starch snack at your regular mealtime if your restaurant meal is going to be later than usual.     Call ahead to see if the restaurant can meet your dietary needs if you've never been there before. Or you can go online to see the menu ahead of time.    Carry some crackers with you in case the restaurant needs you to wait until you can be served.    Ask how foods are prepared before you order.    Instead of fried, sautéed, or breaded foods, choose ones that are broiled, steamed, grilled, or baked.    Ask for sauces, gravies, and dressings on the side.    Only eat an amount that fits your meal plan. Remember: You can take home the leftovers.    Save dessert for special occasions. Then choose a small dessert or share one with a friend or family member.  Make healthy choices  Fast food    Garden salad with light dressing on the side    Baked potato with vegetables or herbs    Broiled, roasted, or grilled chicken sandwich    Sliced turkey or lean roast beef sandwich  Mexican    Chicken enchilada, without cheese or sour  cream     Small burrito with whole beans and chicken    Whole beans (not refried) and rice    Chicken or fish fajitas  Steakhouse    Grilled or broiled lean cuts of beef    Baked potato with vegetables or herbs    Broiled or baked chicken. Don t eat the skin.    Steamed vegetables  Asian    Steamed dumplings or potstickers    Broiled, boiled, or steamed meats or fish    Sushi or sashimi    Steamed rice or boiled noodles. One serving is equal to 1/3 cup.  Date Last Reviewed: 6/1/2016 2000-2017 [employer]. [address] [place] [address], [place] [address]. All rights reserved. This information is not intended as a substitute for professional medical care. Always follow your healthcare professional's instructions.

## 2017-09-11 NOTE — MR AVS SNAPSHOT
After Visit Summary   9/11/2017    Tio Merino    MRN: [id number]           Patient Information     Date Of Birth          1985        Visit Information        Provider Department      9/11/2017 5:00 PM Yamileth Monaco APRN CNP [facility]        Today's Diagnoses     Type 2 diabetes mellitus without complication, without long-term current use of insulin (H)    -  1    Hypertension, goal below 140/90        Moderate persistent asthma with acute exacerbation        Hyperlipidemia LDL goal <100          Care Instructions    Based on your medical history and these are the current health maintenance or preventive care services that you are due for (some may have been done at this visit)  Health Maintenance Due   Topic Date Due     ASTHMA CONTROL TEST Q6 MOS  07/31/2017     INFLUENZA VACCINE (SYSTEM ASSIGNED)  09/01/2017         At [facility], we strive to deliver an exceptional experience to you, every time we see you.    If you receive a survey in the mail, please send us back your thoughts. We really do value your feedback.    Your care team's suggested websites for health information:  Www.iBuildApp.org : Up to date and easily searchable information on multiple topics.  Www.medlineplus.gov : medication info, interactive tutorials, watch real surgeries online  Www.familydoctor.org : good info from the Academy of Family Physicians  Www.cdc.gov : public health info, travel advisories, epidemics (H1N1)  Www.aap.org : children's health info, normal development, vaccinations  Www.health.[facility].us : [place] dept of health, public health issues in [place], N1N1    How to contact your care team:   Team [facility] [phone]         Pharmacy [phone]    Dr. Lamb, Jenna Saldana PA-C, Dr. Sanchez, Shraddha LUKE CNP, Brittany Shankar PA-C, Dr. Banks, and MARLY Roe CNP    Team RNs: Danyell Salinas      Clinic hours  M-Th 7 am-7 pm   Fri 7  am-5 pm.   Urgent care M-F 11 am-9 pm,   Sat/Sun 9 am-5 pm.  Pharmacy M-Th 8 am-8 pm Fri 8 am-6 pm  Sat/Sun 9 am-5 pm.     All password changes, disabled accounts, or ID changes in MyChart/MyHealth will be done by our Access Services Department.    If you need help with your account or password, call: [phone]. Clinic staff no longer has the ability to change passwords.   Page 1 of 2  Dulera HFA Inhaler  Medicines: Mometasone (kvl-ZVH-wt-sone) and Formoterol (for-MOH-ter-ahl)  What it does  These two medicines work to relax the muscles in your airway and help you breath more easily. They decrease inflammation, swelling and mucus over time.   Use every day to prevent symptoms, even if you are feeling well. Do not use for fast relief.   Test Spray (priming)  Prime the inhaler if using for the first time or if not used for 5 days. Shake the inhaler. Then spray into the air 4 times (away from the face).  How to use this inhaler  1. Wash and dry your hands well.  2. Remove the mouthpiece cover. Check for loose parts inside the mouthpiece.  3. Shake the inhaler several times for about 5 seconds.  4. Sit up straight or stand up.  5. Fully exhale (breathe out) through the mouth. Hold the inhaler so the mouthpiece is at the bottom and the canister is sticking straight up.  6. Place the mouthpiece between your lips. Make sure that your tongue is flat under the mouthpiece and does not block the opening.    7. Seal your lips around the mouthpiece.  8. Push the canister all the way down as you begin to take in a deep breath through your mouth over 5 seconds.  9. Hold your breath for 10 seconds. If you cannot hold your breath for 10 seconds, then hold your breath for as long as you can.  10. If you need another dose, wait 30 seconds.  11. Repeat steps 3 to 10 until you reach the dose prescribed by your doctor.  12. Rinse your mouth after the last puff of medicine and spit the water out. Do not swallow.  13. Replace the  cover after each use. Store in a cool, dry place.  This inhaler contains Formoterol, a LABA class medicine. Do not use with other LABA containing medicines.   Page 2 of 2  Cleaning  Clean the inside and outside of mouthpiece with a dry cloth or cotton swab 1 time each week. Be sure to clean the sprayer. Do not put any part of the inhaler in water.  Do not remove the cannister from the inhaler. You might not get the correct dose of medicine, and the dose counter may not work.  How to tell if the inhaler is empty  This inhaler contains 120 puffs (124 before priming). It is empty when the dose counter reads 0 (zero).  If you have questions about the use of your inhaler, please ask your pharmacist or provider.   For more information and video demos, go to www.Intent.org/inhalers.  For informational purposes only. Not to replace the advice of your health care provider.   Copyright   2013 [facility]. All rights reserved. Weichaishi.com 117680 - REV 11/16.    Eating Out When You Have Diabetes  Eating right is an important part of keeping your blood sugar in your target range. You just need to make healthy choices.    Tips for restaurant meals  When you eat away from home try these tips:    Try to schedule your dining-out meal at your normal meal time. Make a reservation if possible, so you don't have to wait to eat. If you can't make a reservation, try to arrive at the restaurant at a less-busy time to cut down your wait time. Eat a small fruit or starch snack at your regular mealtime if your restaurant meal is going to be later than usual.     Call ahead to see if the restaurant can meet your dietary needs if you've never been there before. Or you can go online to see the menu ahead of time.    Carry some crackers with you in case the restaurant needs you to wait until you can be served.    Ask how foods are prepared before you order.    Instead of fried, sautéed, or breaded foods, choose ones that are  broiled, steamed, grilled, or baked.    Ask for sauces, gravies, and dressings on the side.    Only eat an amount that fits your meal plan. Remember: You can take home the leftovers.    Save dessert for special occasions. Then choose a small dessert or share one with a friend or family member.  Make healthy choices  Fast food    Garden salad with light dressing on the side    Baked potato with vegetables or herbs    Broiled, roasted, or grilled chicken sandwich    Sliced turkey or lean roast beef sandwich  Mexican    Chicken enchilada, without cheese or sour cream     Small burrito with whole beans and chicken    Whole beans (not refried) and rice    Chicken or fish fajitas  Steakhouse    Grilled or broiled lean cuts of beef    Baked potato with vegetables or herbs    Broiled or baked chicken. Don t eat the skin.    Steamed vegetables  Asian    Steamed dumplings or potstickers    Broiled, boiled, or steamed meats or fish    Sushi or sashimi    Steamed rice or boiled noodles. One serving is equal to 1/3 cup.  Date Last Reviewed: 6/1/2016 2000-2017 [employer]. [address] [place] [address]. All rights reserved. This information is not intended as a substitute for professional medical care. Always follow your healthcare professional's instructions.                Follow-ups after your visit        Additional Services     DIABETES EDUCATOR REFERRAL       DIABETES SELF MANAGEMENT TRAINING (DSMT)      Your provider has referred you to Diabetes Education: FMG: Diabetes Education - All [facility] [phone]   [email or web address]    Type of training and number of hours: Previous Diagnosis: Follow-up DSMT - 2 hours.    Medicare covers: 10 hours of initial DSMT in 12 month period from the time of first visit, plus 2 hours of follow-up DSMT annually, and additional hours as requested for insulin training.    Diabetes Type: Type 2 - On Oral  Medication             Diabetes Co-Morbidities: hypertension               A1C Goal:  <7.0       A1C is: No results found for: A1C    If an urgent visit is needed or A1C is above 12, Care Team to call the Diabetes Education Team at [phone] or send an In Basket message to the Diabetes Education Pool (P DIAB ED-PATIENT CARE).    Diabetes Education Topics: Comprehensive Knowledge Assessment and Instruction    Special Educational Needs Requiring Individual DSMT: None       MEDICAL NUTRITION THERAPY (MNT) for Diabetes    Medical Nutrition Therapy with a Registered Dietitian can be provided in coordination with Diabetes Self-Management Training to assist in achieving optimal diabetes management.    MNT Type and Hours: Previous diagnosis: Annual follow-up MNT - 2 hours                       Medicare will cover: 3 hours initial MNT in 12 month period after first visit, plus 2 hours of follow-up MNT annually    Please be aware that coverage of these services is subject to the terms and limitations of your health insurance plan.  Call member services at your health plan to determine Diabetes Self-Management Training benefits and ask which blood glucose monitor brands are covered by your plan.      Please bring the following with you to your appointment:    (1)  List of current medications   (2)  List of Blood Glucose Monitor brands that are covered by your insurance plan  (3)  Blood Glucose Monitor and log book  (4)   Food records for the 3 days prior to your visit    The Certified Diabetes Educator may make diabetes medication adjustments per the CDE Protocol and Collaborative Practice Agreement.                  Who to contact     If you have questions or need follow up information about today's clinic visit or your schedule please contact [facility] directly at [phone].  Normal or non-critical lab and imaging results will be communicated to you by MyChart, letter or phone within 4 business  "days after the clinic has received the results. If you do not hear from us within 7 days, please contact the clinic through Doculogy or phone. If you have a critical or abnormal lab result, we will notify you by phone as soon as possible.  Submit refill requests through Doculogy or call your pharmacy and they will forward the refill request to us. Please allow 3 business days for your refill to be completed.          Additional Information About Your Visit        Doculogy Information     Doculogy lets you send messages to your doctor, view your test results, renew your prescriptions, schedule appointments and more. To sign up, go to [email or web address] . Click on \"Log in\" on the left side of the screen, which will take you to the Welcome page. Then click on \"Sign up Now\" on the right side of the page.     You will be asked to enter the access code listed below, as well as some personal information. Please follow the directions to create your username and password.     Your access code is: [email or web address]  Expires: 12/10/2017  5:31 PM     Your access code will  in 90 days. If you need help or a new code, please call your [facility] clinic or [phone].        Care EveryWhere ID     This is your Care EveryWhere ID. This could be used by other organizations to access your [facility] medical records  [id number]        Your Vitals Were     Pulse Temperature Height Pulse Oximetry BMI (Body Mass Index)       69 97.3  F (36.3  C) (Oral) 5' 10.5\" (1.791 m) 97% 31.15 kg/m2        Blood Pressure from Last 3 Encounters:   17 (!) 136/92   17 130/86   17 138/87    Weight from Last 3 Encounters:   17 220 lb 3.2 oz (99.9 kg)   17 214 lb 11.2 oz (97.4 kg)   17 211 lb (95.7 kg)              We Performed the Following     Albumin Random Urine Quantitative with Creat Ratio     Comprehensive metabolic panel (BMP + Alb, Alk Phos, ALT, AST, Total. Bili, TP)     DIABETES EDUCATOR REFERRAL     " Hemoglobin A1c     Lipid Profile (Chol, Trig, HDL, LDL calc)     TSH with free T4 reflex          Today's Medication Changes          These changes are accurate as of: 9/11/17  5:31 PM.  If you have any questions, ask your nurse or doctor.               Start taking these medicines.        Dose/Directions    ASPIRIN NOT PRESCRIBED   Commonly known as:  INTENTIONAL   Used for:  Type 2 diabetes mellitus without complication, without long-term current use of insulin (H)   Started by:  Yamileth Monaco APRN CNP        Please choose reason not prescribed, below   Quantity:  0 each   Refills:  0       lisinopril 10 MG tablet   Commonly known as:  PRINIVIL/ZESTRIL   Used for:  Hypertension, goal below 140/90   Started by:  Yamileth Monaco APRN CNP        Dose:  10 mg   Take 1 tablet (10 mg) by mouth daily   Quantity:  90 tablet   Refills:  1         These medicines have changed or have updated prescriptions.        Dose/Directions    atorvastatin 10 MG tablet   Commonly known as:  LIPITOR   This may have changed:  See the new instructions.   Used for:  Hyperlipidemia LDL goal <100   Changed by:  Yamileth Monaco APRN CNP        Dose:  10 mg   Take 1 tablet (10 mg) by mouth daily   Quantity:  90 tablet   Refills:  3       glipiZIDE 5 MG tablet   Commonly known as:  GLUCOTROL   This may have changed:  when to take this   Used for:  Type 2 diabetes mellitus without complication, without long-term current use of insulin (H)   Changed by:  Yamileth Monaco APRN CNP        Dose:  5 mg   Take 1 tablet (5 mg) by mouth daily   Quantity:  90 tablet   Refills:  1       * metFORMIN 500 MG 24 hr tablet   Commonly known as:  GLUCOPHAGE-XR   This may have changed:  Another medication with the same name was added. Make sure you understand how and when to take each.   Changed by:  Yamileth Monaco APRN CNP        TAKE 4 TABLETS BY MOUTH DAILY   Refills:  0       * metFORMIN 500 MG 24 hr tablet   Commonly known as:  GLUCOPHAGE-XR    This may have changed:  You were already taking a medication with the same name, and this prescription was added. Make sure you understand how and when to take each.   Used for:  Type 2 diabetes mellitus without complication, without long-term current use of insulin (H)   Changed by:  Yamileth Monaco APRN CNP        Dose:  500 mg   Take 1 tablet (500 mg) by mouth daily (with dinner)   Quantity:  360 tablet   Refills:  3       * mometasone-formoterol 100-5 MCG/ACT oral inhaler   Commonly known as:  DULERA   This may have changed:  Another medication with the same name was added. Make sure you understand how and when to take each.   Used for:  Mild persistent asthma without complication   Changed by:  Shruthi Sanchez MD        Dose:  2 puff   Inhale 2 puffs into the lungs 2 times daily   Quantity:  1 Inhaler   Refills:  3       * mometasone-formoterol 200-5 MCG/ACT oral inhaler   Commonly known as:  DULERA   This may have changed:  You were already taking a medication with the same name, and this prescription was added. Make sure you understand how and when to take each.   Used for:  Moderate persistent asthma with acute exacerbation   Changed by:  Yamileth Monaco APRN CNP        Dose:  2 puff   Inhale 2 puffs into the lungs 2 times daily   Quantity:  13 g   Refills:  1       * Notice:  This list has 4 medication(s) that are the same as other medications prescribed for you. Read the directions carefully, and ask your doctor or other care provider to review them with you.         Where to get your medicines      [place] [facility] [place] [address]  [address], [facility] [address]     Phone:  [phone]     atorvastatin 10 MG tablet    glipiZIDE 5 MG tablet    lisinopril 10 MG tablet    metFORMIN 500 MG 24 hr tablet    mometasone-formoterol 200-5 MCG/ACT oral inhaler         Some of these will need a paper prescription and others can be  bought over the counter.  Ask your nurse if you have questions.     You don't need a prescription for these medications     ASPIRIN NOT PRESCRIBED                Primary Care Provider Office Phone # Fax #    Aguilar Carroll [phone]       [address]  [facility] [address]        Equal Access to Services     MYRANDA MENDOSA : Hadii aad ku hadasho Soomaali, waaxda luqadaha, qaybta kaalmada adeegyada, waxay idiin hayaan adeeg kharash la'aan . So [facility] [phone].    ATENCIÓN: Si habla español, tiene a corea disposición servicios gratuitos de asistencia lingüística. Llame al [phone].    We comply with applicable federal civil rights laws and [place] laws. We do not discriminate on the basis of race, color, national origin, age, disability sex, sexual orientation or gender identity.            Thank you!     Thank you for choosing [facility]  for your care. Our goal is always to provide you with excellent care. Hearing back from our patients is one way we can continue to improve our services. Please take a few minutes to complete the written survey that you may receive in the mail after your visit with us. Thank you!             Your Updated Medication List - Protect others around you: Learn how to safely use, store and throw away your medicines at www.disposemymeds.org.          This list is accurate as of: 9/11/17  5:31 PM.  Always use your most recent med list.                   Brand Name Dispense Instructions for use Diagnosis    albuterol 90 MCG/ACT inhaler      Inhale 2 puffs into the lungs every 4 hours as needed. for asthma symptoms.        ASPIRIN NOT PRESCRIBED    INTENTIONAL    0 each    Please choose reason not prescribed, below    Type 2 diabetes mellitus without complication, without long-term current use of insulin (H)       atorvastatin 10 MG tablet    LIPITOR    90 tablet    Take 1 tablet (10 mg) by mouth daily    Hyperlipidemia LDL goal <100        glipiZIDE 5 MG tablet    GLUCOTROL    90 tablet    Take 1 tablet (5 mg) by mouth daily    Type 2 diabetes mellitus without complication, without long-term current use of insulin (H)       Lancets Misc      Use as directed.  Up to 2 times daily. Pharmacy dispense brand based on insurance.        lisinopril 10 MG tablet    PRINIVIL/ZESTRIL    90 tablet    Take 1 tablet (10 mg) by mouth daily    Hypertension, goal below 140/90       * metFORMIN 500 MG 24 hr tablet    GLUCOPHAGE-XR     TAKE 4 TABLETS BY MOUTH DAILY        * metFORMIN 500 MG 24 hr tablet    GLUCOPHAGE-XR    360 tablet    Take 1 tablet (500 mg) by mouth daily (with dinner)    Type 2 diabetes mellitus without complication, without long-term current use of insulin (H)       * mometasone-formoterol 100-5 MCG/ACT oral inhaler    DULERA    1 Inhaler    Inhale 2 puffs into the lungs 2 times daily    Mild persistent asthma without complication       * mometasone-formoterol 200-5 MCG/ACT oral inhaler    DULERA    13 g    Inhale 2 puffs into the lungs 2 times daily    Moderate persistent asthma with acute exacerbation       montelukast 10 MG tablet    SINGULAIR    90 tablet    Take 1 tablet (10 mg) by mouth At Bedtime    Mild persistent asthma without complication       PROAIR  (90 BASE) MCG/ACT Inhaler   Generic drug:  albuterol     8.5 g    INHALE 2 PUFFS INTO THE LUNGS EVERY 6 HOURS AS NEEDED FOR SHORTNESS OF BREATH OR WHEEZING    Mild persistent asthma without complication       * Notice:  This list has 4 medication(s) that are the same as other medications prescribed for you. Read the directions carefully, and ask your doctor or other care provider to review them with you.

## 2017-09-11 NOTE — Clinical Note
Please abstract the following data from this visit with this patient into the appropriate field in Epic:  Eye exam with ophthalmology on this date: 1/2017 Influenza vaccine 9/2/17 at [facility]

## 2017-09-11 NOTE — NURSING NOTE
".  Chief Complaint   Patient presents with     Diabetes     Fasting     Refill Request       Initial BP (!) 136/92 (BP Location: Right arm, Patient Position: Sitting, Cuff Size: Adult Regular)  Pulse 69  Temp 97.3  F (36.3  C) (Oral)  Ht 5' 10.5\" (1.791 m)  Wt 220 lb 3.2 oz (99.9 kg)  SpO2 97%  BMI 31.15 kg/m2 Estimated body mass index is 31.15 kg/(m^2) as calculated from the following:    Height as of this encounter: 5' 10.5\" (1.791 m).    Weight as of this encounter: 220 lb 3.2 oz (99.9 kg).  Medication Reconciliation: complete   Sona Turner MA      "

## 2017-09-11 NOTE — TELEPHONE ENCOUNTER
Panel Management Review          Fail List measure: ACT      Patient is due/failing the following:   ACT    Action needed:   Patient needs to do ACT.    Type of outreach:    Will posptone for 1 month and then recheck.    Questions for provider review:    None                                                                                                                                    Sona Turner CMA      Chart routed to Care Team .

## 2017-09-11 NOTE — PROGRESS NOTES
SUBJECTIVE:   Tio Merino is a 32 year old male who presents to clinic today for the following health issues:      Diabetes Follow-up    Patient is checking blood sugars: twice daily.    Blood sugar testing frequency justification: Risk of hypoglycemia with medication(s)  Results are as follows:       am - 130-135       suppertime - 100        Diabetic concerns: None      Symptoms of hypoglycemia (low blood sugar): shaky     Paresthesias (numbness or burning in feet) or sores: No   Date of last diabetic eye exam: January 2017      Amount of exercise or physical activity: 4-5 days/week for an average of 45-60 minutes    Problems taking medications regularly: No    Medication side effects: none  Diet: diabetic and carbohydrate counting  Pthas been out of his medication for the last 5 days.    Asthma      Duration: ongoing    Description (location/character/radiation): wheezing with exercise, worse in the winter months    Intensity:  moderate    Accompanying signs and symptoms: wheezing, shortness of breath with change in temp,    History (similar episodes/previous evaluation): yes    Precipitating or alleviating factors: cold weather, Fall    Therapies tried and outcome: Dulera 100-5mcg still not helping, using  Albuterol TID         Problem list and histories reviewed & adjusted, as indicated.  Additional history: as documented    Patient Active Problem List   Diagnosis     CARDIOVASCULAR SCREENING; LDL GOAL LESS THAN 160     Mild persistent asthma     Moderate persistent asthma     Type 2 diabetes mellitus (H)     History reviewed. No pertinent surgical history.    Social History   Substance Use Topics     Smoking status: Never Smoker     Smokeless tobacco: Never Used     Alcohol use Yes     Family History   Problem Relation Age of Onset     DIABETES Maternal Grandfather      DIABETES Other      multiple people on mother's side     Colon Cancer No family hx of      Prostate Cancer No family hx of      Asthma  "No family hx of          BP Readings from Last 3 Encounters:   09/11/17 (!) 136/92   03/20/17 130/86   01/31/17 138/87    Wt Readings from Last 3 Encounters:   09/11/17 220 lb 3.2 oz (99.9 kg)   03/20/17 214 lb 11.2 oz (97.4 kg)   01/31/17 211 lb (95.7 kg)                  Labs reviewed in EPIC        Reviewed and updated as needed this visit by clinical staff       Reviewed and updated as needed this visit by Provider         ROS:  Constitutional, HEENT, cardiovascular, pulmonary, gi and gu systems are negative, except as otherwise noted.      OBJECTIVE:   BP (!) 136/92 (BP Location: Right arm, Patient Position: Sitting, Cuff Size: Adult Regular)  Pulse 69  Temp 97.3  F (36.3  C) (Oral)  Ht 5' 10.5\" (1.791 m)  Wt 220 lb 3.2 oz (99.9 kg)  SpO2 97%  BMI 31.15 kg/m2  Body mass index is 31.15 kg/(m^2).  GENERAL: healthy, alert and no distress  EYES: Eyes grossly normal to inspection, PERRL and conjunctivae and sclerae normal  HENT: ear canals and TM's normal, nose and mouth without ulcers or lesions  NECK: no adenopathy, no asymmetry, masses, or scars and thyroid normal to palpation  RESP: lungs clear to auscultation - no rales, rhonchi or wheezes  CV: regular rate and rhythm, normal S1 S2, no S3 or S4, no murmur, click or rub, no peripheral edema and peripheral pulses strong  ABDOMEN: soft, nontender, no hepatosplenomegaly, no masses and bowel sounds normal  MS: no gross musculoskeletal defects noted, no edema  SKIN: no suspicious lesions or rashes  NEURO: Normal strength and tone, mentation intact and speech normal  BACK: no CVA tenderness, no paralumbar tenderness  PSYCH: mentation appears normal, affect normal/bright  LYMPH: normal ant/post cervical, supraclavicular nodes  Diabetic foot exam: normal DP and PT pulses, no trophic changes or ulcerative lesions, normal sensory exam and normal monofilament exam    Diagnostic Test Results:  No results found for this or any previous visit (from the past 24 " "hour(s)).    ASSESSMENT/PLAN:         BMI:   Estimated body mass index is 31.15 kg/(m^2) as calculated from the following:    Height as of this encounter: 5' 10.5\" (1.791 m).    Weight as of this encounter: 220 lb 3.2 oz (99.9 kg).   Weight management plan: Discussed healthy diet and exercise guidelines and patient will follow up in 6 months in clinic to re-evaluate.        1. Type 2 diabetes mellitus without complication, without long-term current use of insulin (H)  Getting patient back on his medications, continue to check sugars at least once daily and prn, current on eye exam- done 1/2017.  Referring for diabetes Ed update, return to clinic 3 months,. I will call with any abnormal lab results.  - Albumin Random Urine Quantitative with Creat Ratio  - metFORMIN (GLUCOPHAGE-XR) 500 MG 24 hr tablet; Take 1 tablet (500 mg) by mouth daily (with dinner)  Dispense: 360 tablet; Refill: 3  - Comprehensive metabolic panel (BMP + Alb, Alk Phos, ALT, AST, Total. Bili, TP)  - TSH with free T4 reflex  - Hemoglobin A1c  - glipiZIDE (GLUCOTROL) 5 MG tablet; Take 1 tablet (5 mg) by mouth daily  Dispense: 90 tablet; Refill: 1  - ASPIRIN NOT PRESCRIBED (INTENTIONAL); Please choose reason not prescribed, below  Dispense: 0 each; Refill: 0  - DIABETES EDUCATOR REFERRAL    2. Hypertension, goal below 140/90  Starting Zestril, discussed dosing, potential side effects, indications for return to clinic.  Will recheck BP within 1 david (RN visit OK).  - lisinopril (PRINIVIL/ZESTRIL) 10 MG tablet; Take 1 tablet (10 mg) by mouth daily  Dispense: 90 tablet; Refill: 1  - Comprehensive metabolic panel (BMP + Alb, Alk Phos, ALT, AST, Total. Bili, TP)  - Hemoglobin A1c    3. Moderate persistent asthma with acute exacerbation  Increasing Dulera dose, discussed asthma triggers/avoidance, Asthma Action Plan reviewed.  - mometasone-formoterol (DULERA) 200-5 MCG/ACT oral inhaler; Inhale 2 puffs into the lungs 2 times daily  Dispense: 13 g; Refill: " 1    4. Hyperlipidemia LDL goal <100  Due for lipid screen, refilled Lipitor, low chol diet discussed  - Lipid Profile (Chol, Trig, HDL, LDL calc)  - atorvastatin (LIPITOR) 10 MG tablet; Take 1 tablet (10 mg) by mouth daily  Dispense: 90 tablet; Refill: 3    See Patient Instructions    MARLY Lehman [facility]

## 2017-09-11 NOTE — LETTER
My Asthma Action Plan  Name: Tio Merino   YOB: 1985  Date: 9/11/2017   My doctor: MARLY Lehman CNP   My clinic: [facility]        My Control Medicine: Mometasone + formoterol (Dulera) -  200/5 mcg 2 puffs twice daily  My Rescue Medicine: Albuterol (Proair/Ventolin/Proventil) inhaler 2 puffs every hour hours as needed for cough, wheezing, sob   My Asthma Severity: moderate persistent  Avoid your asthma triggers: exercise or sports and cold air               GREEN ZONE   Good Control    I feel good    No cough or wheeze    Can work, sleep and play without asthma symptoms       Take your asthma control medicine every day.     1. If exercise triggers your asthma, take your rescue medication    15 minutes before exercise or sports, and    During exercise if you have asthma symptoms  2. Spacer to use with inhaler: If you have a spacer, make sure to use it with your inhaler             YELLOW ZONE Getting Worse  I have ANY of these:    I do not feel good    Cough or wheeze    Chest feels tight    Wake up at night   1. Keep taking your Green Zone medications  2. Start taking your rescue medicine:    every 20 minutes for up to 1 hour. Then every 4 hours for 24-48 hours.  3. If you stay in the Yellow Zone for more than 12-24 hours, contact your doctor.  4. If you do not return to the Green Zone in 12-24 hours or you get worse, start taking your oral steroid medicine if prescribed by your provider.           RED ZONE Medical Alert - Get Help  I have ANY of these:    I feel awful    Medicine is not helping    Breathing getting harder    Trouble walking or talking    Nose opens wide to breathe       1. Take your rescue medicine NOW  2. If your provider has prescribed an oral steroid medicine, start taking it NOW  3. Call your doctor NOW  4. If you are still in the Red Zone after 20 minutes and you have not reached your doctor:    Take your rescue medicine again and    Call [phone]  or go to the emergency room right away    See your regular doctor within 2 weeks of an Emergency Room or Urgent Care visit for follow-up treatment.        Electronically signed by: Yamileth Monaco, September 11, 2017    Annual Reminders:  Meet with Asthma Educator,  Flu Shot in the Fall, consider Pneumonia Vaccination for patients with asthma (aged 19 and older).    Pharmacy:    [id number] - [facility], [place] - [address] [facility] [id number] [place] [address] [facility] [id number] [facility], [place] - [address] AT [facility]  [id number] - [place], [place] - [address] AT [facility]                    Asthma Triggers  How To Control Things That Make Your Asthma Worse    Triggers are things that make your asthma worse.  Look at the list below to help you find your triggers and what you can do about them.  You can help prevent asthma flare-ups by staying away from your triggers.      Trigger                                                          What you can do   Cigarette Smoke  Tobacco smoke can make asthma worse. Do not allow smoking in your home, car or around you.  Be sure no one smokes at a child s day care or school.  If you smoke, ask your health care provider for ways to help you quit.  Ask family members to quit too.  Ask your health care provider for a referral to Quit Plan to help you quit smoking, or call 7-712-444-PLAN.     Colds, Flu, Bronchitis  These are common triggers of asthma. Wash your hands often.  Don t touch your eyes, nose or mouth.  Get a flu shot every year.     Dust Mites  These are tiny bugs that live in cloth or carpet. They are too small to see. Wash sheets and blankets in hot water every week.   Encase pillows and mattress in dust mite proof covers.  Avoid having carpet if you can. If you have carpet, vacuum weekly.   Use a dust mask and HEPA vacuum.   Pollen and Outdoor  Mold  Some people are allergic to trees, grass, or weed pollen, or molds. Try to keep your windows closed.  Limit time out doors when pollen count is high.   Ask you health care provider about taking medicine during allergy season.     Animal Dander  Some people are allergic to skin flakes, urine or saliva from pets with fur or feathers. Keep pets with fur or feathers out of your home.    If you can t keep the pet outdoors, then keep the pet out of your bedroom.  Keep the bedroom door closed.  Keep pets off cloth furniture and away from stuffed toys.     Mice, Rats, and Cockroaches  Some people are allergic to the waste from these pests.   Cover food and garbage.  Clean up spills and food crumbs.  Store grease in the refrigerator.   Keep food out of the bedroom.   Indoor Mold  This can be a trigger if your home has high moisture. Fix leaking faucets, pipes, or other sources of water.   Clean moldy surfaces.  Dehumidify basement if it is damp and smelly.   Smoke, Strong Odors, and Sprays  These can reduce air quality. Stay away from strong odors and sprays, such as perfume, powder, hair spray, paints, smoke incense, paint, cleaning products, candles and new carpet.   Exercise or Sports  Some people with asthma have this trigger. Be active!  Ask your doctor about taking medicine before sports or exercise to prevent symptoms.    Warm up for 5-10 minutes before and after sports or exercise.     Other Triggers of Asthma  Cold air:  Cover your nose and mouth with a scarf.  Sometimes laughing or crying can be a trigger.  Some medicines and food can trigger asthma.

## 2017-09-12 ENCOUNTER — TELEPHONE (OUTPATIENT)
Dept: FAMILY MEDICINE | Facility: CLINIC | Age: 32
End: 2017-09-12

## 2017-09-12 DIAGNOSIS — E11.9 TYPE 2 DIABETES MELLITUS WITHOUT COMPLICATION, WITHOUT LONG-TERM CURRENT USE OF INSULIN (H): ICD-10-CM

## 2017-09-12 ASSESSMENT — ASTHMA QUESTIONNAIRES: ACT_TOTALSCORE: 15

## 2017-09-12 NOTE — TELEPHONE ENCOUNTER
Good Afternoon,     This patient stopped in here tonight stating he take Metformin 500mg ER four tablets daily. The RX we got states only 1 tablet per day. Can we get a corrected script?    Thank You!  Adwoa Dey [facility] [place]  [phone]

## 2017-09-13 RX ORDER — METFORMIN HCL 500 MG
2000 TABLET, EXTENDED RELEASE 24 HR ORAL
Qty: 360 TABLET | Refills: 3 | Status: SHIPPED | OUTPATIENT
Start: 2017-09-13 | End: 2018-06-01

## 2017-09-13 RX ORDER — GLIPIZIDE 10 MG/1
10 TABLET, FILM COATED, EXTENDED RELEASE ORAL DAILY
Qty: 90 TABLET | Refills: 1 | Status: SHIPPED | OUTPATIENT
Start: 2017-09-13 | End: 2018-06-01

## 2017-09-23 DIAGNOSIS — J45.30 MILD PERSISTENT ASTHMA WITHOUT COMPLICATION: ICD-10-CM

## 2017-09-23 NOTE — TELEPHONE ENCOUNTER
ALBUTEROL 108 (90 BASE) MCG/ACT inhaler       Last Written Prescription Date: 8/2/17  Last Fill Quantity: 8.5, # refills: 0    Last Office Visit with [facility], [facility] or [facility] prescribing provider:  9/11/17   Future Office Visit:       Date of Last Asthma Action Plan Letter:   Asthma Action Plan Q1 Year    Topic Date Due     Asthma Action Plan - yearly  09/11/2018      Asthma Control Test:   ACT Total Scores 9/11/2017   ACT TOTAL SCORE -   ASTHMA ER VISITS -   ASTHMA HOSPITALIZATIONS -   ACT TOTAL SCORE (Goal Greater than or Equal to 20) 15   In the past 12 months, how many times did you visit the emergency room for your asthma without being admitted to the hospital? 0   In the past 12 months, how many times were you hospitalized overnight because of your asthma? 0       Date of Last Spirometry Test:   No results found for this or any previous visit.        Myrna VICK Radiology

## 2017-09-26 RX ORDER — ALBUTEROL SULFATE 90 UG/1
AEROSOL, METERED RESPIRATORY (INHALATION)
Qty: 8.5 G | Refills: 5 | Status: SHIPPED | OUTPATIENT
Start: 2017-09-26 | End: 2018-06-01

## 2017-10-12 NOTE — TELEPHONE ENCOUNTER
This writer attempted to contact Tio on 10/12/17      Reason for call ACT and left message to return call.      If patient calls back:  Patient contacted by 2nd floor [facility] Team (MA/TC). Inform patient that someone from the team will contact them, document that pt called and route to care team. .        Saskia Islas MA

## 2017-10-27 ENCOUNTER — MYC MEDICAL ADVICE (OUTPATIENT)
Dept: FAMILY MEDICINE | Facility: CLINIC | Age: 32
End: 2017-10-27

## 2017-11-09 NOTE — TELEPHONE ENCOUNTER
This writer attempted to contact Tio on 11/09/17      Reason for call ACT questionnaire and left detailed message.      If patient calls back:   Relay message ACT Questionnaire was sent through MY-CHART ask the pt to replied to that message, (read verbatim), document that pt called and close encounter.        Lay Hughes, [facility]

## 2017-12-08 NOTE — TELEPHONE ENCOUNTER
This writer attempted to contact patient on 12/08/17      Reason for call ACT and left message to return call, unable to reach x 3 encounter closed.      If patient calls back:  Patient contacted by 2nd floor [facility] Team (MA/TC). Inform patient that someone from the team will contact them, document that pt called and route to care team. .        Saskia Islas MA

## 2018-01-03 ENCOUNTER — TELEPHONE (OUTPATIENT)
Dept: FAMILY MEDICINE | Facility: CLINIC | Age: 33
End: 2018-01-03

## 2018-01-03 NOTE — TELEPHONE ENCOUNTER
Panel Management Review      BP Readings from Last 1 Encounters:   09/11/17 (!) 136/92      Last Office Visit with this department: 9/12/2017    Fail List measure: Diabetes       Patient is due/failing the following:   A1C, BP CHECK and FOLLOW UP    Action needed:   Patient needs office visit for diabetes follow up.    Type of outreach:    Phone, left message for patient to call back.  and Sent [place] message.    Questions for provider review:    None                                                                                                                                    Genesis Oliver, [facility]      Chart routed to Care Team.

## 2018-03-15 DIAGNOSIS — I10 HYPERTENSION, GOAL BELOW 140/90: ICD-10-CM

## 2018-03-15 NOTE — LETTER
Tio Merino  [address]  [place] [address]          03/22/18      Dear Tio Merino        At [facility] we care about your health and are committed to providing quality patient care. Regular appointments are a vital part of the care and management of your health and can help prevent many of the complications that can occur.      It has come to our attention that you are due for an office visit. Please call [facility] at [phone] soon to schedule your appointment.    If you have transferred care to another clinic please call to inform us so that we do not continue to send you reminder letters.      Sincerely,      [facility] Care Team

## 2018-03-15 NOTE — TELEPHONE ENCOUNTER
"Requested Prescriptions   Pending Prescriptions Disp Refills     lisinopril (PRINIVIL/ZESTRIL) 10 MG tablet [Pharmacy Med Name: LISINOPRIL 10MG TABS] 90 tablet 1    Last Written Prescription Date:  9/11/17  Last Fill Quantity: 90,  # refills: 1   Last Office Visit with [facility], P or [facility] prescribing provider:  9/11/2017     Future Office Visit:      Sig: TAKE ONE TABLET BY MOUTH EVERY DAY    ACE Inhibitors (Including Combos) Protocol Failed    3/15/2018 12:45 PM       Failed - Blood pressure under 140/90 in past 12 months    BP Readings from Last 3 Encounters:   09/11/17 (!) 136/92   03/20/17 130/86   01/31/17 138/87                Passed - Recent (12 mo) or future (30 days) visit within the authorizing provider's specialty    Patient had office visit in the last 12 months or has a visit in the next 30 days with authorizing provider or within the authorizing provider's specialty.  See \"Patient Info\" tab in inbasket, or \"Choose Columns\" in Meds & Orders section of the refill encounter.           Passed - Patient is age 18 or older       Passed - Normal serum creatinine on file in past 12 months    Recent Labs   Lab Test  09/11/17   1743   CR  0.71            Passed - Normal serum potassium on file in past 12 months    Recent Labs   Lab Test  09/11/17   1743   POTASSIUM  4.0               "

## 2018-03-16 NOTE — TELEPHONE ENCOUNTER
Routing refill request to provider for review/approval because:  BP is out of range.    Teri Laguerre RN, BSN

## 2018-03-19 RX ORDER — LISINOPRIL 10 MG/1
TABLET ORAL
Qty: 30 TABLET | Refills: 0 | Status: SHIPPED | OUTPATIENT
Start: 2018-03-19 | End: 2018-06-01 | Stop reason: SINTOL

## 2018-03-20 NOTE — TELEPHONE ENCOUNTER
This writer attempted to contact patient on 03/20/18      Reason for call patient given a david refill, needs an appointment and left message.      If patient calls back:   Schedule Office Visit appointment within 2 weeks with primary care.         Triny Olson MA

## 2018-03-22 NOTE — TELEPHONE ENCOUNTER
This writer attempted to contact Patient on 03/22/18      Reason for call Lizeth refill given needs an appointment and left message and sent letter.      If patient calls back:   Schedule Office Visit appointment within 2 weeks with primary care.        Triny Olson MA

## 2018-04-30 ENCOUNTER — TRANSFERRED RECORDS (OUTPATIENT)
Dept: HEALTH INFORMATION MANAGEMENT | Facility: CLINIC | Age: 33
End: 2018-04-30

## 2018-04-30 LAB
CREAT SERPL-MCNC: 0.79 MG/DL (ref 0.72–1.25)
GFR SERPL CREATININE-BSD FRML MDRD: >60 ML/MIN/1.73M2
GLUCOSE SERPL-MCNC: 190 MG/DL (ref 65–100)
POTASSIUM SERPL-SCNC: 3.5 MMOL/L (ref 3.5–5)

## 2018-05-30 ENCOUNTER — TELEPHONE (OUTPATIENT)
Dept: FAMILY MEDICINE | Facility: CLINIC | Age: 33
End: 2018-05-30

## 2018-05-31 NOTE — PROGRESS NOTES
SUBJECTIVE:   Tio Merino is a 33 year old male who presents to clinic today for the following health issues:      New Patient/Transfer of Care  Diabetes Follow-up      Patient is checking blood sugars: 2 times a week    Diabetic concerns: None     Symptoms of hypoglycemia (low blood sugar): none     Paresthesias (numbness or burning in feet) or sores: No     Date of last diabetic eye exam: 5/18    BP Readings from Last 2 Encounters:   06/01/18 135/83   09/11/17 (!) 136/92     Hemoglobin A1C (%)   Date Value   06/01/2018 8.4 (H)   09/11/2017 8.0 (H)     LDL Cholesterol Calculated (mg/dL)   Date Value   09/11/2017 153 (H)       Amount of exercise or physical activity: 2-3 days/week for an average of 45-60 minutes    Problems taking medications regularly: No    Medication side effects: none    Diet: diabetic      Problem list and histories reviewed & adjusted, as indicated.  Additional history: as documented    Patient Active Problem List   Diagnosis     CARDIOVASCULAR SCREENING; LDL GOAL LESS THAN 160     Mild persistent asthma     Moderate persistent asthma     Type 2 diabetes mellitus (H)     History reviewed. No pertinent surgical history.    Social History   Substance Use Topics     Smoking status: Never Smoker     Smokeless tobacco: Never Used     Alcohol use Yes      Comment: rare     Family History   Problem Relation Age of Onset     DIABETES Maternal Grandfather      Hypertension Maternal Grandfather      DIABETES Other      multiple people on mother's side     Hypertension Mother      Hypertension Maternal Grandmother      Colon Cancer No family hx of      Prostate Cancer No family hx of      Asthma No family hx of          Current Outpatient Prescriptions   Medication Sig Dispense Refill     albuterol (PROAIR HFA) 108 (90 Base) MCG/ACT Inhaler INHALE 2 PUFFS INTO THE LUNGS EVERY 6 HOURS AS NEEDED FOR SHORTNESS OF BREATH OR WHEEZING 8.5 g 11     ASPIRIN NOT PRESCRIBED (INTENTIONAL) Please choose  reason not prescribed, below 0 each 0     atorvastatin (LIPITOR) 10 MG tablet Take 1 tablet (10 mg) by mouth daily 90 tablet 3     glipiZIDE (GLIPIZIDE XL) 10 MG 24 hr tablet Take 1 tablet (10 mg) by mouth daily 90 tablet 1     hydrochlorothiazide (HYDRODIURIL) 25 MG tablet Take 1 tablet (25 mg) by mouth daily 90 tablet 3     metFORMIN (GLUCOPHAGE-XR) 500 MG 24 hr tablet Take 4 tablets (2,000 mg) by mouth daily (with dinner) 360 tablet 3     montelukast (SINGULAIR) 10 MG tablet Take 1 tablet (10 mg) by mouth At Bedtime 90 tablet 3     [DISCONTINUED] ALBUTEROL 108 (90 BASE) MCG/ACT inhaler INHALE 2 PUFFS INTO THE LUNGS EVERY 6 HOURS AS NEEDED FOR SHORTNESS OF BREATH OR WHEEZING 8.5 g 0     [DISCONTINUED] atorvastatin (LIPITOR) 10 MG tablet Take 1 tablet (10 mg) by mouth daily 90 tablet 3     [DISCONTINUED] glipiZIDE (GLIPIZIDE XL) 10 MG 24 hr tablet Take 1 tablet (10 mg) by mouth daily 90 tablet 1     [DISCONTINUED] hydrochlorothiazide (HYDRODIURIL) 25 MG tablet Take 25 mg by mouth daily       [DISCONTINUED] metFORMIN (GLUCOPHAGE-XR) 500 MG 24 hr tablet Take 4 tablets (2,000 mg) by mouth daily (with dinner) 360 tablet 3     [DISCONTINUED] metFORMIN (GLUCOPHAGE-XR) 500 MG 24 hr tablet TAKE 4 TABLETS BY MOUTH DAILY       [DISCONTINUED] montelukast (SINGULAIR) 10 MG tablet Take 1 tablet (10 mg) by mouth At Bedtime 90 tablet 1     [DISCONTINUED] PROAIR  (90 BASE) MCG/ACT inhaler INHALE 2 PUFFS INTO THE LUNGS EVERY 6 HOURS AS NEEDED FOR SHORTNESS OF BREATH OR WHEEZING 8.5 g 5     Allergies   Allergen Reactions     Lisinopril Cough     BP Readings from Last 3 Encounters:   06/01/18 135/83   09/11/17 (!) 136/92   03/20/17 130/86    Wt Readings from Last 3 Encounters:   06/01/18 218 lb (98.9 kg)   09/11/17 220 lb 3.2 oz (99.9 kg)   03/20/17 214 lb 11.2 oz (97.4 kg)                  Labs reviewed in EPIC    Reviewed and updated as needed this visit by clinical staff  Tobacco  Allergies  Meds  Med Hx  Surg Hx  Fam  "Hx  Soc Hx      Reviewed and updated as needed this visit by Provider  Allergies         ROS:  Constitutional, HEENT, cardiovascular, pulmonary, GI, , musculoskeletal, neuro, skin, endocrine and psych systems are negative, except as otherwise noted.    OBJECTIVE:     /83  Pulse 79  Temp 98.5  F (36.9  C) (Oral)  Resp 16  Ht 5' 10\" (1.778 m)  Wt 218 lb (98.9 kg)  SpO2 96%  BMI 31.28 kg/m2  Body mass index is 31.28 kg/(m^2).  GENERAL: healthy, alert and no distress  EYES: Eyes grossly normal to inspection, PERRL and conjunctivae and sclerae normal  NECK: no adenopathy, no asymmetry, masses, or scars and thyroid normal to palpation  RESP: lungs clear to auscultation - no rales, rhonchi or wheezes  CV: regular rate and rhythm, normal S1 S2, no S3 or S4, no murmur, click or rub, no peripheral edema and peripheral pulses strong  PSYCH: mentation appears normal, affect normal/bright  Diabetic foot exam: normal DP and PT pulses, no trophic changes or ulcerative lesions and normal sensory exam    Diagnostic Test Results:  See orders    ASSESSMENT/PLAN:         ICD-10-CM    1. Type 2 diabetes mellitus without complication, without long-term current use of insulin (H) E11.9 Hemoglobin A1c     metFORMIN (GLUCOPHAGE-XR) 500 MG 24 hr tablet     glipiZIDE (GLIPIZIDE XL) 10 MG 24 hr tablet     Renal panel (Alb, BUN, Ca, Cl, CO2, Creat, Gluc, Phos, K, Na)     Albumin Random Urine Quantitative with Creat Ratio     CANCELED: Glucose   2. Screening for HIV (human immunodeficiency virus) Z11.4 HIV Antigen Antibody Combo    once in lifetime   3. Screening for lipoid disorders Z13.220 Lipid panel reflex to direct LDL Fasting   4. Screening for thyroid disorder Z13.29 TSH with free T4 reflex   5. Mild persistent asthma without complication J45.30 montelukast (SINGULAIR) 10 MG tablet     albuterol (PROAIR HFA) 108 (90 Base) MCG/ACT Inhaler   6. Hyperlipidemia LDL goal <100 E78.5 atorvastatin (LIPITOR) 10 MG tablet   7. " Benign essential hypertension I10 hydrochlorothiazide (HYDRODIURIL) 25 MG tablet     Renal panel (Alb, BUN, Ca, Cl, CO2, Creat, Gluc, Phos, K, Na)   8. Examination of eyes and vision Z01.00 OPTOMETRY REFERRAL       See Patient Instructions: follow up as needed for any health care questions.     Danyell Banuelos, MORENO  [facility]

## 2018-06-01 ENCOUNTER — OFFICE VISIT (OUTPATIENT)
Dept: FAMILY MEDICINE | Facility: CLINIC | Age: 33
End: 2018-06-01
Payer: COMMERCIAL

## 2018-06-01 VITALS
DIASTOLIC BLOOD PRESSURE: 83 MMHG | TEMPERATURE: 98.5 F | RESPIRATION RATE: 16 BRPM | WEIGHT: 218 LBS | OXYGEN SATURATION: 96 % | SYSTOLIC BLOOD PRESSURE: 135 MMHG | HEIGHT: 70 IN | BODY MASS INDEX: 31.21 KG/M2 | HEART RATE: 79 BPM

## 2018-06-01 DIAGNOSIS — Z01.00 EXAMINATION OF EYES AND VISION: ICD-10-CM

## 2018-06-01 DIAGNOSIS — E78.5 HYPERLIPIDEMIA LDL GOAL <100: ICD-10-CM

## 2018-06-01 DIAGNOSIS — J45.30 MILD PERSISTENT ASTHMA WITHOUT COMPLICATION: ICD-10-CM

## 2018-06-01 DIAGNOSIS — Z13.29 SCREENING FOR THYROID DISORDER: ICD-10-CM

## 2018-06-01 DIAGNOSIS — Z11.4 SCREENING FOR HIV (HUMAN IMMUNODEFICIENCY VIRUS): ICD-10-CM

## 2018-06-01 DIAGNOSIS — E11.9 TYPE 2 DIABETES MELLITUS WITHOUT COMPLICATION, WITHOUT LONG-TERM CURRENT USE OF INSULIN (H): Primary | ICD-10-CM

## 2018-06-01 DIAGNOSIS — I10 BENIGN ESSENTIAL HYPERTENSION: ICD-10-CM

## 2018-06-01 DIAGNOSIS — Z13.220 SCREENING FOR LIPOID DISORDERS: ICD-10-CM

## 2018-06-01 LAB
ALBUMIN SERPL-MCNC: 3.8 G/DL (ref 3.4–5)
ANION GAP SERPL CALCULATED.3IONS-SCNC: 9 MMOL/L (ref 3–14)
BUN SERPL-MCNC: 14 MG/DL (ref 7–30)
CALCIUM SERPL-MCNC: 8.7 MG/DL (ref 8.5–10.1)
CHLORIDE SERPL-SCNC: 102 MMOL/L (ref 94–109)
CHOLEST SERPL-MCNC: 170 MG/DL
CO2 SERPL-SCNC: 30 MMOL/L (ref 20–32)
CREAT SERPL-MCNC: 0.74 MG/DL (ref 0.66–1.25)
CREAT UR-MCNC: 377 MG/DL
GFR SERPL CREATININE-BSD FRML MDRD: >90 ML/MIN/1.7M2
GLUCOSE SERPL-MCNC: 179 MG/DL (ref 70–99)
HBA1C MFR BLD: 8.4 % (ref 0–5.6)
HDLC SERPL-MCNC: 31 MG/DL
LDLC SERPL CALC-MCNC: 105 MG/DL
MICROALBUMIN UR-MCNC: 37 MG/L
MICROALBUMIN/CREAT UR: 9.73 MG/G CR (ref 0–17)
NONHDLC SERPL-MCNC: 139 MG/DL
PHOSPHATE SERPL-MCNC: 4.1 MG/DL (ref 2.5–4.5)
POTASSIUM SERPL-SCNC: 3.8 MMOL/L (ref 3.4–5.3)
SODIUM SERPL-SCNC: 141 MMOL/L (ref 133–144)
TRIGL SERPL-MCNC: 169 MG/DL
TSH SERPL DL<=0.005 MIU/L-ACNC: 1.62 MU/L (ref 0.4–4)

## 2018-06-01 PROCEDURE — 83036 HEMOGLOBIN GLYCOSYLATED A1C: CPT | Performed by: NURSE PRACTITIONER

## 2018-06-01 PROCEDURE — 99214 OFFICE O/P EST MOD 30 MIN: CPT | Performed by: NURSE PRACTITIONER

## 2018-06-01 PROCEDURE — 80061 LIPID PANEL: CPT | Performed by: NURSE PRACTITIONER

## 2018-06-01 PROCEDURE — 36415 COLL VENOUS BLD VENIPUNCTURE: CPT | Performed by: NURSE PRACTITIONER

## 2018-06-01 PROCEDURE — 80069 RENAL FUNCTION PANEL: CPT | Performed by: NURSE PRACTITIONER

## 2018-06-01 PROCEDURE — 87389 HIV-1 AG W/HIV-1&-2 AB AG IA: CPT | Performed by: NURSE PRACTITIONER

## 2018-06-01 PROCEDURE — 82043 UR ALBUMIN QUANTITATIVE: CPT | Performed by: NURSE PRACTITIONER

## 2018-06-01 PROCEDURE — 84443 ASSAY THYROID STIM HORMONE: CPT | Performed by: NURSE PRACTITIONER

## 2018-06-01 RX ORDER — ATORVASTATIN CALCIUM 10 MG/1
10 TABLET, FILM COATED ORAL DAILY
Qty: 90 TABLET | Refills: 3 | Status: SHIPPED | OUTPATIENT
Start: 2018-06-01 | End: 2019-05-22

## 2018-06-01 RX ORDER — MONTELUKAST SODIUM 10 MG/1
10 TABLET ORAL AT BEDTIME
Qty: 90 TABLET | Refills: 3 | Status: SHIPPED | OUTPATIENT
Start: 2018-06-01 | End: 2019-10-24

## 2018-06-01 RX ORDER — ALBUTEROL SULFATE 90 UG/1
AEROSOL, METERED RESPIRATORY (INHALATION)
Qty: 8.5 G | Refills: 11 | Status: SHIPPED | OUTPATIENT
Start: 2018-06-01 | End: 2019-07-25

## 2018-06-01 RX ORDER — HYDROCHLOROTHIAZIDE 25 MG/1
25 TABLET ORAL DAILY
Qty: 90 TABLET | Refills: 3 | Status: SHIPPED | OUTPATIENT
Start: 2018-06-01 | End: 2019-05-22

## 2018-06-01 RX ORDER — METFORMIN HCL 500 MG
2000 TABLET, EXTENDED RELEASE 24 HR ORAL
Qty: 360 TABLET | Refills: 3 | Status: SHIPPED | OUTPATIENT
Start: 2018-06-01 | End: 2019-05-22

## 2018-06-01 RX ORDER — GLIPIZIDE 10 MG/1
10 TABLET, FILM COATED, EXTENDED RELEASE ORAL DAILY
Qty: 90 TABLET | Refills: 1 | Status: SHIPPED | OUTPATIENT
Start: 2018-06-01 | End: 2018-12-20

## 2018-06-01 RX ORDER — HYDROCHLOROTHIAZIDE 25 MG/1
25 TABLET ORAL DAILY
COMMUNITY
Start: 2018-04-30 | End: 2018-06-01

## 2018-06-01 NOTE — NURSING NOTE
"Chief Complaint   Patient presents with     Diabetes       Initial /83  Pulse 79  Temp 98.5  F (36.9  C) (Oral)  Resp 16  Ht 5' 10\" (1.778 m)  Wt 218 lb (98.9 kg)  SpO2 96%  BMI 31.28 kg/m2 Estimated body mass index is 31.28 kg/(m^2) as calculated from the following:    Height as of this encounter: 5' 10\" (1.778 m).    Weight as of this encounter: 218 lb (98.9 kg).  Medication Reconciliation: complete     Rakel Sim MA  "

## 2018-06-01 NOTE — PROGRESS NOTES
Rafy Kinsey,    Thank you for your recent office visit.    Here are your recent results.  Your a1c / blood glucose are higher, please continue to work on this. If you would like to meet with our diabetic educator, please let me know. Your cholesterol is fine, however being a diabetic, its always a good thing to improve.  Below are some tips that you could incorporate into your daily routine to help improve your cholesterol.  Follow up as needed.     Feel free to contact me via [email or web address] or call the clinic at [phone].    Sincerely,    Danyell Banuelos, APRN, [facility]                     Controlling Cholesterol  What is cholesterol?   Cholesterol is a fatty substance. It has both good and bad effects on the body. Your body needs small amounts of cholesterol to make hormones and to build and maintain cells. However, when your body has too much cholesterol, deposits of fat called plaque form inside the walls of your blood vessels (arteries). The blood vessel walls thicken and the vessels become narrower. This is a condition called atherosclerosis. These changes make it harder for blood to flow through the blood vessels, increasing your risk of heart disease, heart attack, and stroke. The plaque can also easily break off and cause a blockage. When the artery is blocked, no blood can flow through it. This prevents the heart muscle from getting oxygen and can cause a heart attack. If a piece of plaque breaks off and flows to the brain, it can cause a stroke.   Most of the cholesterol in your blood is made by your liver from the fats, carbohydrates, and proteins you eat. You also get cholesterol by eating animal products such as meat, eggs, and high-fat dairy products such as whole milk, cream, and real butter.   It is important to find out what your cholesterol numbers are because lowering cholesterol levels that are too high lessens your risk for developing heart disease. It reduces the chance of a heart attack or death  "from heart disease, even if you already have heart disease.   How is cholesterol measured?   When you get your cholesterol checked, your healthcare provider will give you a number for your total cholesterol level. You can use the chart below to see if your total cholesterol is high.     Total Cholesterol Level (mg/dL)  ----------------------------------------  less than 200   good  200 to 239      borderline high  240 or above    high  ----------------------------------------    When your total cholesterol is measured and found to be high, your healthcare provider may also check the amount of LDL (low-density lipoprotein) and HDL (high-density lipoprotein) in your blood. LDL and HDL carry cholesterol through your blood. LDL carries a lot of cholesterol, leaves behind fatty deposits on your artery walls, and contributes to heart disease. HDL does the opposite. HDL cleans the artery walls and removes extra cholesterol from the body, thus lowering the risk of heart disease. LDL cholesterol is called bad cholesterol. (You can think of \"L\" for \"lousy\" cholesterol.) HDL cholesterol is called good cholesterol (think of \"H\" for \"healthy\" cholesterol). It is good to have low levels of LDL and high levels of HDL.   Because HDL cholesterol protects against heart disease, higher numbers are better. HDL levels of 60 mg/dL or more help to lower your risk for heart disease. A level equal to or less than 40 mg/dL is low and is considered a major risk factor because it increases your risk for developing heart disease   The level of LDL cholesterol that is healthy for you depends on your risk of heart disease and heart attack. In general, the higher your LDL level and the more risk factors you have for heart disease, the greater your chances of developing heart disease or having a heart attack. These are the recommended goals for LDL, according to risk level:   The goal is less than 160 mg/dL if your risk of heart disease is low. "   The goal is less than 130 mg/dL if you have a moderate risk.   The goal is less than 100 mg/dL if you have a high risk of heart disease or you already have heart disease or diabetes.   For many people with heart disease, especially if they also have diabetes, the goal is less than 70 mg/dL.   Lowering cholesterol, especially the LDL, is connected or linked with:   Slowing, stopping, or even reversing the buildup of plaque   Reducing the chances of heart attack by making plaques more stable and less likely to break off or rupture.   This means the chance of having a heart attack is much less.   In addition to high levels of total cholesterol and LDL, major risks for heart disease include:   diabetes   cigarette smoking   high blood pressure (140/90 mm Hg or higher or you are taking blood pressure medicine)   low HDL cholesterol (less than 40 mg/dL)   family history of early heart disease (father or brother diagnosed with heart disease before age 55, or mother or sister diagnosed before age 65)   age 45 or older for men and age 55 or older for women.   If you have diabetes, your risk of heart disease is high. If you do not have diabetes but you have 2 or more of the other risk factors in this list, your risk is moderate to high. Based on your personal and family history, your healthcare provider can help you calculate your risk level.   How can I control my cholesterol level?   High cholesterol may run in families. Know your family history and discuss it with your healthcare provider.   You can often control cholesterol levels by   eating right   exercising   not smoking   losing weight if you are overweight.   If you have a high risk for heart disease, your healthcare provider may prescribe cholesterol-lowering medicine as well as changes in lifestyle.   Eat right.  Follow these diet guidelines to help control your cholesterol:   Limit the cholesterol in your diet to less than 300 mg per day. If you have heart  "disease, limit cholesterol to less than 200 mg per day.   Be careful about the amounts and types of fat that you eat. Fats should contribute no more than 25 to 35% of your daily calories. Most of your dietary fat should be from polyunsaturated and monounsaturated fats, which are healthier than saturated fat and trans fats.   Saturated fat raises your blood cholesterol because it makes it hard for the body to clear the cholesterol away. Less than 7 to 10% of your calories should come from saturated fat. Saturated fat is found in different amounts in almost all foods. Butter, some oils, meat, and poultry fat contain a lot of saturated fat.   Trans fatty acids, often called trans fats, tend to raise your bad LDL cholesterol and lower your good HDL cholesterol. Trans fats naturally occur in some foods, mostly in meat and dairy products. But food makers can create trans fats when they are preparing food for grocery stores. This is usually done by adding hydrogen to fats. If the list of ingredients of a food product includes the words \"partially hydrogenated\" (usually referring to oils, such as soybean oil and others), the product is likely to contain trans fats. Try to eat as little trans fat as possible. As of January 2006, nutrition labels must list trans fats if the food contains them. Check the nutrition bar on the side of the package.   Polyunsaturated fats are found in fish and some vegetable oils. Monounsaturated fats are found in olive oil, canola oil, and avocados. Both types of these healthier fats are also found in many nuts and legumes.   Adjust the amount of calories you eat and exercise regularly, according to your healthcare provider's exercise prescription, to maintain your recommended body weight.   To control the cholesterol and types and amounts of fat you eat:   Check food labels for fat and cholesterol content. Choose the foods with less fat per serving.   Limit the amount of butter and margarine you " eat.   Use olive, canola, sunflower, safflower, soybean, or corn oil. Avoid tropical oils such as palm or coconut oil. Also avoid oils that have been hydrogenated or partially hydrogenated.   Use salad dressings and margarine made with polyunsaturated and monounsaturated fats.   Use egg whites or egg substitutes rather than whole eggs.   Replace whole-milk dairy products with nonfat or low-fat milk, cheese, spreads, and yogurt.   Eat skinless chicken, turkey, fish, and meatless entrees more often than red meat.   Choose lean cuts of meat and trim off all visible fat. Keep portion sizes moderate.   Avoid fatty desserts such as ice cream, cream-filled cakes, and cheesecakes. Choose fresh fruits, nonfat frozen yogurt, Popsicles, etc.   Reduce the amount of fried foods, vending machine food, and fast food you eat.   Eat several daily servings of fruits and vegetables (especially fresh fruits and leafy vegetables), beans, and whole grains (such as whole wheat, bran, brown rice, oats, and oatmeal). The fiber in these foods helps lower cholesterol.   Eat 4 to 5 servings of nuts a week. Examples of nuts that can be a part of a healthy diet are walnuts, almonds, hazelnuts, peanuts, pecans, and pistachio nuts.   Look for low-fat or nonfat varieties of the foods you like to eat, or look for substitutes.   Exercise.  Exercise goes hand-in-hand with a healthy diet for controlling cholesterol. Exercise helps because it:   Keeps your weight down.   Decreases your total cholesterol level.   Decreases your LDL (bad cholesterol).   Increases your HDL (good cholesterol).   A good exercise program includes aerobic exercise. Aerobic exercise is any activity that keeps your heart rate up (such as swimming, jogging, walking, and bicycling). You should get at least 30 minutes of moderate aerobic exercise most days of the week. Moderate aerobic exercise is generally defined as requiring the energy it takes to walk 2 miles in 30 minutes. You  "may need to exercise 60 minutes a day to prevent weight gain and 90 minutes a day to lose weight.   If you haven't been exercising, ask your healthcare provider for an exercise prescription and start your new exercise program slowly.   Don't smoke.  Do not smoke. Smoking increases your risk of heart disease because it lowers HDL levels, increases your risk of blood clots, and decreases oxygen to the tissues.   Lose excess weight.  Extra weight increases your risk for heart and blood vessel disease. One way it does this is by causing your LDL (\"bad\") cholesterol to go up. Extra weight can also make you tired. It takes a lot of energy to carry all those pounds around. The result is that you are less active. This can mean that you don't get enough exercise and gain even more weight.   Losing excess weight:   Improves not only the bad LDL cholesterol but other blood fats as well.   Lowers your risk for heart attack or stroke.   Increases your energy and helps you feel better (both physically and mentally) and become more active.   Your weight is primarily the result of 2 factors. One is the number of calories you consume. The other is the number of calories you \"burn\". If you eat more calories than you use, your body will store the extra calories as fat and your weight will go up. If your body uses more calories than you eat, you will lose weight.   Here are some things you can do to lose weight.   Talk to your healthcare provider about your weight. Ask how a change in diet and exercise will change your cholesterol levels. Plan for gradual weight loss, just 1 or 2 pounds per week   Eat fewer calories.   Get more physical activity.   Keep a diary of your food and exercise for a couple of weeks to become more aware of your habits.   In summary, changes that you can make in your lifestyle to control your cholesterol level are:   Eat healthy.   Get regular exercise.   Don't smoke.   Keep a healthy weight.   Have your " cholesterol levels checked as often as your provider recommends.   Cholesterol in the Diet  Cholesterol is a fatty substance in your body and in foods made from animals. There is a lot of it in meat, including beef, pork, chicken, and turkey. Whole-milk dairy products, egg yolks, and shellfish also have a lot of cholesterol.   Your body needs cholesterol to make hormones and build nerve cells. You don't have to get it from food because your body makes cholesterol. If you eat too many foods high in cholesterol or saturated fat you can get too much cholesterol. It can cause high levels of cholesterol in the blood. High cholesterol increases your risk for heart disease.   How are saturated fat and trans fats related to cholesterol levels?   Like cholesterol, saturated fats are found mostly in animal products. Limiting the saturated fat in your diet is just as important as limiting cholesterol because your body makes more cholesterol when you eat saturated fat. Trans fats are another type of fat in animal products. Trans fats are also in many processed foods, such as cakes, cookies and potato chips. Like saturated fat, trans fats raise cholesterol levels in the blood.   How much cholesterol do animal products have?   As the table below shows, some foods have more cholesterol and saturated fat than others. They may be high in both, or low in both, or high in one but not the other. The healthiest diets include mostly foods that are low in cholesterol, saturated, and trans fat.   Most foods in the meat group have about the same amount of cholesterol per serving, regardless of the type or cut of meat. However, the amount of saturated fat in these various meats can be very different. High-fat cuts, such as prime rib and dark-meat poultry with the skin, contain a lot more saturated fat than lean cuts, such as pork tenderloin and chicken breast without skin.   Whole-milk dairy products, such as whole milk, cheese, ice cream,  sour cream, and butter, have a lot of cholesterol and saturated fat. The good news is that food producers can remove both cholesterol and saturated fat from dairy foods. When dairy is skimmed of its fats, the cholesterol is skimmed off along with it. Skim (nonfat) dairy products are a healthy food choice.   Shellfish are low in saturated fat. Some shellfish are high in cholesterol, but the saturated fat is so low that these foods are still healthy. Fin fish, such as salmon, tuna, trout, and halibut, are relatively low in cholesterol and saturated fat.   Cholesterol and Saturated Fat Content of Selected Foods     Food                                 Fat             Cholesterol                                      (grams)         (milligrams)  --------------------------------------------------------------------  8 ounces (oz) whole milk             4.5 g               25 mg  8 ounces skim milk                   0.36 g               5 mg  1 tablespoon butter                  7 g                 30 mg  4 tablespoon sour cream              5.5 g               24 mg  3 oz. pork tenderloin                2 g                 65 mg  3 oz. pork sausage                   7.5 g               70 mg  3 oz sirloin steak                   3 g                 76 mg  3 oz beef ribs                       5 g                 69 mg  3 oz chicken breast without skin     1 g                 73 mg  3 oz chicken thigh with skin         3.7 g               79 mg  3 oz shrimp                          0.25               166 mg  3 oz salmon                          1.5                 50 mg  1/2 cup vegetable shortening        25.5 g                0 mg   ---------------------------------------------------------------------    How much cholesterol can I have in my diet?   The guidelines for cholesterol in the diet depend on your medical condition. The recommendations are:   less than 200 mg a day if you have high cholesterol or heart disease    less than 300 mg of cholesterol a day if you do not have high cholesterol or heart disease.   Everyone should try to avoid saturated and trans fats.   Limiting cholesterol, saturated fat, and trans fat is easy if you get in the habit of cooking lean. Choose the leanest cuts of meats and dairy products, including more fish and less processed food. Some plant foods, such as palm oil, coconut oil, and cocoa butter do contain saturated fat, but it is not known if these fats have the same harmful effect on the heart as the saturated fat in animal products. Plant foods, such as grains, fruits, vegetables, vegetable oils, nuts, and seeds, do not contain any cholesterol.     Published by [employer].  This content is reviewed periodically and is subject to change as new health information becomes available. The information is intended to inform and educate and is not a replacement for medical evaluation, advice, diagnosis or treatment by a healthcare professional.   Ximena Duarte RD, CDE   ? 2010 [facility] and/or its affiliates. All Rights Reserved.

## 2018-06-01 NOTE — MR AVS SNAPSHOT
After Visit Summary   6/1/2018    Tio Merino    MRN: [id number]           Patient Information     Date Of Birth          1985        Visit Information        Provider Department      6/1/2018 8:00 AM Danyell Banuelos NP [facility]        Today's Diagnoses     Type 2 diabetes mellitus without complication, without long-term current use of insulin (H)    -  1    Screening for HIV (human immunodeficiency virus)        Screening for lipoid disorders        Screening for thyroid disorder        Mild persistent asthma without complication        Hyperlipidemia LDL goal <100        Benign essential hypertension        Examination of eyes and vision          Care Instructions    Goals for Your Diabetes Care  A1c   Goal:  Less than 7 percent--ask your doctor if this is right for you  Check it: Every 3 to 6 months  Why:  Shows how well your blood glucose was controlled over the past 3 months  Blood pressure   Goal: Under 140/90  Check it:  At every clinic check up for diabetes  Why:  May prevent stroke, heart disease and eye and kidney diseases  Cholesterol   Goal:  Discuss cholesterol management with your doctor, and consider taking a statin medicine  Check it:  Every year  Why:  Helps prevent heart attacks and stroke  Kidney test (urine microalbumin)  Goal:  Under 30  Do it:  Every year  Why:  Finds kidney problems before they get worse  Foot exam   Goal:  No cuts or sores, normal sensation  Do it: Remove shoes and socks at every visit; have a monofilament test every year  Why: Finds foot problems before they get worse  Eye exam   Goal:  No changes in your eyes  Do it: Every year  Why:  Finds eye problems before they get worse  Exercise  Goal:  150 minutes of aerobic exercises and 2 to 3 sessions of strength training  Do it: Every week  Why:  Helps manage diabetes and improve health  Aspirin  Goal:  If you are age 50 or older, ask your doctor if you should take aspirin  Take it: Every  day, or as prescribed  Why: Lowers the risk of heart attack and stroke  Smoking and tobacco  Goal: No tobacco use  Why: Tobacco is a major risk for heart disease  Diabetes education  Goal: Learn how to manage your diabetes  Do it: At least once a year--ask your doctor for a referral  Why: The more you know, the better you can manage your diabetes  Your goals may be different from what you see here. Your care team will tell you what your goals should be.   For informational purposes only. Not to replace the advice of your health care provider.   Copyright   2009 [facility]. All rights reserved. [employer] 307789 - Rev 01/16.            Follow-ups after your visit        Additional Services     OPTOMETRY REFERRAL       Your provider has referred you to: FMG: [facility] [phone]   [email or web address]  FMG: [facility] [phone]    [email or web address]  FMG: [facility] [phone]    [email or web address]  FMG: [facility] [phone]    [email or web address]  N: [facility] [place] [phone]   [email or web address]    Please be aware that coverage of these services is subject to the terms and limitations of your health insurance plan.  Call member services at your health plan with any benefit or coverage questions.      Please bring the following with you to your appointment:    (1) Any X-Rays, CTs or MRIs which have been performed.  Contact the facility where they were done to arrange for  prior to your scheduled appointment.    (2) List of current medications  (3) This referral request   (4) Any documents/labs given to you for this referral                  Follow-up notes from your care team     Return if symptoms worsen or fail to improve.      Who to  "contact     Normal or non-critical lab and imaging results will be communicated to you by [employer], letter or phone within 4 business days after the clinic has received the results. If you do not hear from us within 7 days, please contact the clinic through [employer] or phone. If you have a critical or abnormal lab result, we will notify you by phone as soon as possible.  Submit refill requests through [employer] or call your pharmacy and they will forward the refill request to us. Please allow 3 business days for your refill to be completed.          If you need to speak with a  for additional information , please call: [phone]             Additional Information About Your Visit        [employer] Information     [employer] gives you secure access to your electronic health record. If you see a primary care provider, you can also send messages to your care team and make appointments. If you have questions, please call your primary care clinic.  If you do not have a primary care provider, please call [phone] and they will assist you.        Care EveryWhere ID     This is your Care EveryWhere ID. This could be used by other organizations to access your [place] medical records  [id number]        Your Vitals Were     Pulse Temperature Respirations Height Pulse Oximetry BMI (Body Mass Index)    79 98.5  F (36.9  C) (Oral) 16 5' 10\" (1.778 m) 96% 31.28 kg/m2       Blood Pressure from Last 3 Encounters:   06/01/18 135/83   09/11/17 (!) 136/92   03/20/17 130/86    Weight from Last 3 Encounters:   06/01/18 218 lb (98.9 kg)   09/11/17 220 lb 3.2 oz (99.9 kg)   03/20/17 214 lb 11.2 oz (97.4 kg)              We Performed the Following     Albumin Random Urine Quantitative with Creat Ratio     Glucose     Hemoglobin A1c     HIV Antigen Antibody Combo     Lipid panel reflex to direct LDL Fasting     OPTOMETRY REFERRAL     Renal panel (Alb, BUN, Ca, Cl, CO2, Creat, Gluc, Phos, K, Na)     TSH with free T4 reflex  "         Today's Medication Changes          These changes are accurate as of 6/1/18  8:21 AM.  If you have any questions, ask your nurse or doctor.               These medicines have changed or have updated prescriptions.        Dose/Directions    albuterol 108 (90 Base) MCG/ACT Inhaler   Commonly known as:  PROAIR HFA   This may have changed:  Another medication with the same name was removed. Continue taking this medication, and follow the directions you see here.   Used for:  Mild persistent asthma without complication   Changed by:  Danyell Banuelos NP        INHALE 2 PUFFS INTO THE LUNGS EVERY 6 HOURS AS NEEDED FOR SHORTNESS OF BREATH OR WHEEZING   Quantity:  8.5 g   Refills:  11         Stop taking these medicines if you haven't already. Please contact your care team if you have questions.     Lancets Misc   Stopped by:  Danyell Banuelos NP           lisinopril 10 MG tablet   Commonly known as:  PRINIVIL/ZESTRIL   Stopped by:  Danyell Banuelos NP                Where to get your medicines      These medications were sent to [facility] [place] [facility] [address] [facility]  [address] [address] [facility] [address] [facility] [place] [address]     Phone:  [phone]     albuterol 108 (90 Base) MCG/ACT Inhaler    atorvastatin 10 MG tablet    glipiZIDE 10 MG 24 hr tablet    hydrochlorothiazide 25 MG tablet    metFORMIN 500 MG 24 hr tablet    montelukast 10 MG tablet                Primary Care Provider Office Phone # Fax #    Danyell Banuelos [phone]       [address] [facility]  [place] [address]        Equal Access to Services     [facility] Center[facility] AH: Hadii aad ku hadasho Soomaali, waaxda luqadaha, qaybta kaalmada adeegyada, waxay idiin hayaan debra lopez . So [facility] [phone].    ATENCIÓN: Si habla español, tiene a corea disposición servicios gratuitos de asistencia lingüística. Llame al [phone].    We comply with applicable federal civil rights laws and [place] laws. We do not discriminate on  the basis of race, color, national origin, age, disability, sex, sexual orientation, or gender identity.            Thank you!     Thank you for choosing [facility]  for your care. Our goal is always to provide you with excellent care. Hearing back from our patients is one way we can continue to improve our services. Please take a few minutes to complete the written survey that you may receive in the mail after your visit with us. Thank you!             Your Updated Medication List - Protect others around you: Learn how to safely use, store and throw away your medicines at www.disposemymeds.org.          This list is accurate as of 6/1/18  8:21 AM.  Always use your most recent med list.                   Brand Name Dispense Instructions for use Diagnosis    albuterol 108 (90 Base) MCG/ACT Inhaler    PROAIR HFA    8.5 g    INHALE 2 PUFFS INTO THE LUNGS EVERY 6 HOURS AS NEEDED FOR SHORTNESS OF BREATH OR WHEEZING    Mild persistent asthma without complication       ASPIRIN NOT PRESCRIBED    INTENTIONAL    0 each    Please choose reason not prescribed, below    Type 2 diabetes mellitus without complication, without long-term current use of insulin (H)       atorvastatin 10 MG tablet    LIPITOR    90 tablet    Take 1 tablet (10 mg) by mouth daily    Hyperlipidemia LDL goal <100       glipiZIDE 10 MG 24 hr tablet    glipiZIDE XL    90 tablet    Take 1 tablet (10 mg) by mouth daily    Type 2 diabetes mellitus without complication, without long-term current use of insulin (H)       hydrochlorothiazide 25 MG tablet    HYDRODIURIL    90 tablet    Take 1 tablet (25 mg) by mouth daily    Benign essential hypertension       metFORMIN 500 MG 24 hr tablet    GLUCOPHAGE-XR    360 tablet    Take 4 tablets (2,000 mg) by mouth daily (with dinner)    Type 2 diabetes mellitus without complication, without long-term current use of insulin (H)       montelukast 10 MG tablet    SINGULAIR    90 tablet    Take 1 tablet (10 mg)  by mouth At Bedtime    Mild persistent asthma without complication

## 2018-06-01 NOTE — PATIENT INSTRUCTIONS
Goals for Your Diabetes Care  A1c   Goal:  Less than 7 percent ask your doctor if this is right for you  Check it: Every 3 to 6 months  Why:  Shows how well your blood glucose was controlled over the past 3 months  Blood pressure   Goal: Under 140/90  Check it:  At every clinic check up for diabetes  Why:  May prevent stroke, heart disease and eye and kidney diseases  Cholesterol   Goal:  Discuss cholesterol management with your doctor, and consider taking a statin medicine  Check it:  Every year  Why:  Helps prevent heart attacks and stroke  Kidney test (urine microalbumin)  Goal:  Under 30  Do it:  Every year  Why:  Finds kidney problems before they get worse  Foot exam   Goal:  No cuts or sores, normal sensation  Do it: Remove shoes and socks at every visit; have a monofilament test every year  Why: Finds foot problems before they get worse  Eye exam   Goal:  No changes in your eyes  Do it: Every year  Why:  Finds eye problems before they get worse  Exercise  Goal:  150 minutes of aerobic exercises and 2 to 3 sessions of strength training  Do it: Every week  Why:  Helps manage diabetes and improve health  Aspirin  Goal:  If you are age 50 or older, ask your doctor if you should take aspirin  Take it: Every day, or as prescribed  Why: Lowers the risk of heart attack and stroke  Smoking and tobacco  Goal: No tobacco use  Why: Tobacco is a major risk for heart disease  Diabetes education  Goal: Learn how to manage your diabetes  Do it: At least once a year ask your doctor for a referral  Why: The more you know, the better you can manage your diabetes  Your goals may be different from what you see here. Your care team will tell you what your goals should be.   For informational purposes only. Not to replace the advice of your health care provider.   Copyright   2009 [employer]. All rights reserved. [employer] [id number]   Rev 01/16.

## 2018-06-02 ASSESSMENT — ASTHMA QUESTIONNAIRES: ACT_TOTALSCORE: 16

## 2018-06-04 LAB — HIV 1+2 AB+HIV1 P24 AG SERPL QL IA: NONREACTIVE

## 2018-06-04 NOTE — PROGRESS NOTES
Rafy Kinsey,    Thank you for your recent office visit.    Here are your recent results.  Other labs negative/ normal. Follow up as needed.     Feel free to contact me via [employer] or call the clinic at [phone].    Sincerely,    MARLY Dooley, FNP-BC

## 2018-06-07 PROBLEM — I10 BENIGN ESSENTIAL HYPERTENSION: Status: ACTIVE | Noted: 2018-06-07

## 2018-06-07 PROBLEM — E78.5 HYPERLIPIDEMIA LDL GOAL <100: Status: ACTIVE | Noted: 2018-06-07

## 2018-06-22 ENCOUNTER — MYC MEDICAL ADVICE (OUTPATIENT)
Dept: FAMILY MEDICINE | Facility: CLINIC | Age: 33
End: 2018-06-22

## 2018-06-22 NOTE — LETTER
July 16, 2018      Tio Merino  [address] [place] [address]        Dear Tio,     We have tried to contact you by phone and mychart several times.     We have a survey tool called an ACT (or Asthma Control Test) we use to measure the level of control of your asthma.      Please complete the enclosed asthma questionnaire.    You can call us at [phone] with your answers, bring into the clinic,drop off at the pharmacy or , mychart your responses, or mail back in the pre addressed envelope.     This is valuable information that will be requested by your Care Team.    [facility]

## 2018-06-28 NOTE — TELEPHONE ENCOUNTER
Left message for patient to call back pod 1 hotline([phone])      Panel Management Review    Summary:    Patient is due/failing the following:   ACT    Type of outreach:    Phone, left message for patient to call back.  and Sent MyChart message.    Questions for provider review:    None                                                                                                                                    Rakel Sim MA       Chart routed to Care Team .

## 2018-09-07 ENCOUNTER — TELEPHONE (OUTPATIENT)
Dept: FAMILY MEDICINE | Facility: CLINIC | Age: 33
End: 2018-09-07

## 2018-09-07 NOTE — LETTER
September 24, 2018      Tio Merino  [address] [place] [address]        Dear Tio,       We have tried to contact you by phone several times.    This is a reminder letter.     In order to ensure we are providing the best quality care, we have reviewed your chart and see that you are due for:    1.   Your next office visit is due for diabetes, blood pressure, cholesterol, and asthma  2.   Fasting and non fasting lab work  3.   Asthma control test  4.   Flu vaccine     For fasting labs please fast for at least 10 hours. You can still take your medications and have water.    We greatly appreciate the opportunity to serve you.  Thank you for trusting us with your health care.    If you are now being seen elsewhere please let us know and we will remove you from the list.    Sincerely,    [facility] Team

## 2018-09-07 NOTE — TELEPHONE ENCOUNTER
Panel Management Review  Summary:    Patient is due/failing the following:   Office visit for diabetes, blood pressure, cholesterol, asthma  Labs-lipid, a1c  aap and act  Flu shot    Type of outreach:    Phone, left message for patient to call back.  and Sent [employer] message.    Questions for provider review:    None                                                                                                                                    Rakel Sim MA       Chart routed to Care Team .

## 2018-09-13 NOTE — TELEPHONE ENCOUNTER
Left message for patient to call back pod 1 hotline([phone])    Patient is due/failing the following:   Office visit for diabetes, blood pressure, cholesterol, asthma  Labs-lipid, a1c  aap and act  Flu shot

## 2018-09-18 NOTE — TELEPHONE ENCOUNTER
Left message for patient to call back pod 1 hotline([phone])    Patient is due/failing the following:   Office visit for diabetes, blood pressure, cholesterol, asthma  Labs-lipid, a1c  aap and act  Flu shot

## 2018-12-20 DIAGNOSIS — E11.9 TYPE 2 DIABETES MELLITUS WITHOUT COMPLICATION, WITHOUT LONG-TERM CURRENT USE OF INSULIN (H): ICD-10-CM

## 2018-12-20 RX ORDER — GLIPIZIDE 10 MG/1
TABLET, FILM COATED, EXTENDED RELEASE ORAL
Qty: 90 TABLET | Refills: 1 | Status: SHIPPED | OUTPATIENT
Start: 2018-12-20 | End: 2019-05-21

## 2018-12-20 NOTE — TELEPHONE ENCOUNTER
"Requested Prescriptions   Pending Prescriptions Disp Refills     glipiZIDE (GLUCOTROL XL) 10 MG 24 hr tablet [Pharmacy Med Name: GLIPIZIDE ER 10MG TB24] 90 tablet 1    Last Written Prescription Date:  9-6-18  Last Fill Quantity: 90,  # refills: 1   Last office visit: 6/1/2018 with prescribing provider:  6-1-18   Future Office Visit:   Sig: TAKE ONE TABLET BY MOUTH EVERY DAY    Sulfonylurea Agents Failed - 12/20/2018  6:22 AM       Failed - Blood pressure less than 140/90 in past 6 months    BP Readings from Last 3 Encounters:   06/01/18 135/83   09/11/17 (!) 136/92   03/20/17 130/86                Failed - Patient has documented A1c within the specified period of time.    If HgbA1C is 8 or greater, it needs to be on file within the past 3 months.  If less than 8, must be on file within the past 6 months.     Recent Labs   Lab Test 06/01/18  0825   A1C 8.4*            Failed - Recent (6 mo) or future (30 days) visit within the authorizing provider's specialty    Patient had office visit in the last 6 months or has a visit in the next 30 days with authorizing provider or within the authorizing provider's specialty.  See \"Patient Info\" tab in inbasket, or \"Choose Columns\" in Meds & Orders section of the refill encounter.           Passed - Patient has documented LDL within the past 12 mos.    Recent Labs   Lab Test 06/01/18  0825   *            Passed - Patient has had a Microalbumin in the past 12 mos.    Recent Labs   Lab Test 06/01/18  0831   MICROL 37   UMALCR 9.73            Passed - Patient is age 18 or older       Passed - Patient has a recent creatinine (normal) within the past 12 mos.    Recent Labs   Lab Test 06/01/18  0825   CR 0.74             "

## 2018-12-20 NOTE — TELEPHONE ENCOUNTER
Routing refill request to provider for review/approval because:  Labs out of range:  BP    Labs not current:  A1C  Last seen on 6/1/18    BP Readings from Last 3 Encounters:   06/01/18 135/83   09/11/17 (!) 136/92   03/20/17 130/86     Lab Results   Component Value Date    A1C 8.4 06/01/2018    A1C 8.0 09/11/2017    A1C 6.9 07/09/2015         Winifred Du RN, BSN, PHN

## 2019-01-21 NOTE — TELEPHONE ENCOUNTER
Prabhu Marc needs to be seen for an appointment before further refills are given. Can have a shorter term refill after he makes lab and OV appts. Please let him know   This writer attempted to contact pt  on 05/30/18      Reason for call appt today and left message.      If patient calls back:   Relay message that pt had appt with [facility] and not allowed to have 2 appt in one day with Family practice, (read verbatim), document that pt called and close encounter        Caren Haley CMA

## 2019-03-06 ENCOUNTER — TELEPHONE (OUTPATIENT)
Dept: FAMILY MEDICINE | Facility: CLINIC | Age: 34
End: 2019-03-06

## 2019-03-06 DIAGNOSIS — J45.30 MILD PERSISTENT ASTHMA WITHOUT COMPLICATION: ICD-10-CM

## 2019-03-06 NOTE — TELEPHONE ENCOUNTER
Pharmacy states insurance will not cover proair. They will cover ventolin.  Please advise.  Thank You

## 2019-03-11 RX ORDER — ALBUTEROL SULFATE 90 UG/1
2 AEROSOL, METERED RESPIRATORY (INHALATION) EVERY 6 HOURS
Qty: 8.5 G | Refills: 11 | Status: SHIPPED | OUTPATIENT
Start: 2019-03-11 | End: 2020-06-18

## 2019-03-12 NOTE — TELEPHONE ENCOUNTER
albuterol (PROAIR HFA/PROVENTIL HFA/VENTOLIN HFA) 108 (90 Base) MCG/ACT inhaler was e-scribed to pharmacy.

## 2019-04-29 ENCOUNTER — TELEPHONE (OUTPATIENT)
Dept: FAMILY MEDICINE | Facility: CLINIC | Age: 34
End: 2019-04-29

## 2019-04-29 NOTE — TELEPHONE ENCOUNTER
Panel Management Review    Summary:    Patient is due/failing the following:   Follow up diabetes, blood pressure, cholesterol, asthma  Fasting labs-lipid and a1c  aap and act    Type of outreach:    Sent [employer] message.    Questions for provider review:    None                                                                                                                                    Rakel Sim MA       Chart routed to Care Team .

## 2019-04-29 NOTE — LETTER
May 23, 2019      Tio Merino  [address] [place] [address]        Dear Tio,     We have tried to contact you by phone several and/or mychart times.    In order to ensure we are providing the best quality care, we have reviewed your chart and see that you are due for:    1.   Your next office visit is due for diabetes, cholesterol, and asthma  2.   Fasting lab work  3.   Asthma control test-enclosed.     For fasting labs please fast for at least 10 hours. You can still take your medications and have water.    We greatly appreciate the opportunity to serve you.  Thank you for trusting us with your health care.    If you are now being seen elsewhere please let us know and we will remove you from the list.    Sincerely,    The [place] Team

## 2019-05-10 NOTE — TELEPHONE ENCOUNTER
Left message for patient to call back pod 1 hotline([phone])    Patient is due/failing the following:   Follow up diabetes, blood pressure, cholesterol, asthma  Fasting labs-lipid and a1c  aap and act

## 2019-05-21 DIAGNOSIS — I10 BENIGN ESSENTIAL HYPERTENSION: ICD-10-CM

## 2019-05-21 DIAGNOSIS — E11.9 TYPE 2 DIABETES MELLITUS WITHOUT COMPLICATION, WITHOUT LONG-TERM CURRENT USE OF INSULIN (H): ICD-10-CM

## 2019-05-21 DIAGNOSIS — E78.5 HYPERLIPIDEMIA LDL GOAL <100: ICD-10-CM

## 2019-05-21 NOTE — TELEPHONE ENCOUNTER
Requested Prescriptions   Pending Prescriptions Disp Refills     atorvastatin (LIPITOR) 10 MG tablet  Last Written Prescription Date:  06/01/18  Last Fill Quantity: 90,  # refills: 3   Last office visit: 6/1/2018 with prescribing provider:  ANTONIO Jaramillo Office Visit:     90 tablet 3     Sig: Take 1 tablet (10 mg) by mouth daily       There is no refill protocol information for this order        glipiZIDE (GLUCOTROL XL) 10 MG 24 hr tablet  Last Written Prescription Date:  12/20/18  Last Fill Quantity: 90,  # refills: 1   Last office visit: 6/1/2018 with prescribing provider:  ANTONIO Jaramillo Office Visit:     90 tablet 1     Sig: Take 1 tablet (10 mg) by mouth daily       There is no refill protocol information for this order        hydrochlorothiazide (HYDRODIURIL) 25 MG tablet  Last Written Prescription Date:  06/01/18  Last Fill Quantity: 90,  # refills: 3   Last office visit: 6/1/2018 with prescribing provider:  ANTONIO Jaramillo Office Visit:     90 tablet 3     Sig: Take 1 tablet (25 mg) by mouth daily       There is no refill protocol information for this order        metFORMIN (GLUCOPHAGE-XR) 500 MG 24 hr tablet  Last Written Prescription Date:  06/01/18  Last Fill Quantity: 360,  # refills: 3   Last office visit: 6/1/2018 with prescribing provider:  ANTONIO Jaramillo Office Visit:     360 tablet 3     Sig: Take 4 tablets (2,000 mg) by mouth daily (with dinner)       There is no refill protocol information for this order

## 2019-05-22 ENCOUNTER — TELEPHONE (OUTPATIENT)
Dept: FAMILY MEDICINE | Facility: CLINIC | Age: 34
End: 2019-05-22

## 2019-05-22 NOTE — TELEPHONE ENCOUNTER
Patient is due for diabetes follow up, fasting labs, BP check and asthma follow up.   #30 day supply pended with reminder to schedule appointment    Please advise on refills.     Winifred Du RN, BSN, PHN

## 2019-05-22 NOTE — TELEPHONE ENCOUNTER
See, refill request sent yesterday. RN advised 72 hours needed for refills.     Winifred Du RN, BSN, PHN

## 2019-05-22 NOTE — TELEPHONE ENCOUNTER
Pharmacy is calling for status on refill request for rX: metFORMIN (GLUCOPHAGE-XR) 500 MG 24 hr tablet, hydrochlorothiazide (HYDRODIURIL) 25 MG tablet, glipiZIDE (GLUCOTROL XL) 10 MG 24 hr tablet, and atorvastatin (LIPITOR) 10 MG tablet. Please call to advise. Thank you.

## 2019-05-23 RX ORDER — ATORVASTATIN CALCIUM 10 MG/1
10 TABLET, FILM COATED ORAL DAILY
Qty: 30 TABLET | Refills: 0 | Status: SHIPPED | OUTPATIENT
Start: 2019-05-23 | End: 2019-07-25

## 2019-05-23 RX ORDER — GLIPIZIDE 10 MG/1
10 TABLET, FILM COATED, EXTENDED RELEASE ORAL DAILY
Qty: 30 TABLET | Refills: 0 | Status: SHIPPED | OUTPATIENT
Start: 2019-05-23 | End: 2019-07-25

## 2019-05-23 RX ORDER — METFORMIN HCL 500 MG
2000 TABLET, EXTENDED RELEASE 24 HR ORAL
Qty: 120 TABLET | Refills: 0 | Status: SHIPPED | OUTPATIENT
Start: 2019-05-23 | End: 2019-07-25

## 2019-05-23 RX ORDER — HYDROCHLOROTHIAZIDE 25 MG/1
25 TABLET ORAL DAILY
Qty: 30 TABLET | Refills: 0 | Status: SHIPPED | OUTPATIENT
Start: 2019-05-23 | End: 2019-07-25

## 2019-05-30 ENCOUNTER — TRANSFERRED RECORDS (OUTPATIENT)
Dept: HEALTH INFORMATION MANAGEMENT | Facility: CLINIC | Age: 34
End: 2019-05-30

## 2019-06-04 DIAGNOSIS — E78.5 HYPERLIPIDEMIA LDL GOAL <100: ICD-10-CM

## 2019-06-04 DIAGNOSIS — E11.9 TYPE 2 DIABETES MELLITUS WITHOUT COMPLICATION, WITHOUT LONG-TERM CURRENT USE OF INSULIN (H): ICD-10-CM

## 2019-06-04 DIAGNOSIS — I10 BENIGN ESSENTIAL HYPERTENSION: ICD-10-CM

## 2019-06-04 NOTE — TELEPHONE ENCOUNTER
"Requested Prescriptions   Pending Prescriptions Disp Refills     glipiZIDE (GLUCOTROL XL) 10 MG 24 hr tablet [Pharmacy Med Name: GLIPIZIDE ER 10 MG TAB]       Sig: Take 1 tablet (10 mg) by mouth daily   Last Written Prescription Date:  6-3-19  Last Fill Quantity: ?,  # refills: 0   Last office visit: 6/1/2018 with prescribing provider:  6-1-18   Future Office Visit:      Sulfonylurea Agents Failed - 6/4/2019  6:40 AM        Failed - Blood pressure less than 140/90 in past 6 months     BP Readings from Last 3 Encounters:   06/01/18 135/83   09/11/17 (!) 136/92   03/20/17 130/86                 Failed - Patient has documented LDL within the past 12 mos.     Recent Labs   Lab Test 06/01/18  0825   *             Failed - Patient has documented A1c within the specified period of time.     If HgbA1C is 8 or greater, it needs to be on file within the past 3 months.  If less than 8, must be on file within the past 6 months.     Recent Labs   Lab Test 06/01/18  0825   A1C 8.4*             Failed - Patient has a recent creatinine (normal) within the past 12 mos.     Recent Labs   Lab Test 06/01/18  0825   CR 0.74             Failed - Recent (6 mo) or future (30 days) visit within the authorizing provider's specialty     Patient had office visit in the last 6 months or has a visit in the next 30 days with authorizing provider or within the authorizing provider's specialty.  See \"Patient Info\" tab in inbasket, or \"Choose Columns\" in Meds & Orders section of the refill encounter.            Passed - Patient has had a Microalbumin in the past 15 mos.     Recent Labs   Lab Test 06/01/18  0831   MICROL 37   UMALCR 9.73             Passed - Medication is active on med list        Passed - Patient is age 18 or older        hydrochlorothiazide (HYDRODIURIL) 25 MG tablet [Pharmacy Med Name: HYDROCHLOROTHIAZIDE 25MG TAB]       Sig: Take 1 tablet (25 mg) by mouth daily   Last Written Prescription Date:  6-3-19  Last Fill Quantity: " "?,  # refills: 0   Last office visit: 6/1/2018 with prescribing provider:  6-1-18   Future Office Visit:      Diuretics (Including Combos) Protocol Failed - 6/4/2019  6:40 AM        Failed - Blood pressure under 140/90 in past 12 months     BP Readings from Last 3 Encounters:   06/01/18 135/83   09/11/17 (!) 136/92   03/20/17 130/86                 Failed - Recent (12 mo) or future (30 days) visit within the authorizing provider's specialty     Patient had office visit in the last 12 months or has a visit in the next 30 days with authorizing provider or within the authorizing provider's specialty.  See \"Patient Info\" tab in inbasket, or \"Choose Columns\" in Meds & Orders section of the refill encounter.              Failed - Normal serum creatinine on file in past 12 months     Recent Labs   Lab Test 06/01/18  0825   CR 0.74              Failed - Normal serum potassium on file in past 12 months     Recent Labs   Lab Test 06/01/18  0825   POTASSIUM 3.8                    Failed - Normal serum sodium on file in past 12 months     Recent Labs   Lab Test 06/01/18  0825                 Passed - Medication is active on med list        Passed - Patient is age 18 or older        atorvastatin (LIPITOR) 10 MG tablet [Pharmacy Med Name: ATORVASTATIN 10MG]       Sig: Take 1 tablet (10 mg) by mouth daily   Last Written Prescription Date:  6-3-19  Last Fill Quantity: ?,  # refills: 0   Last office visit: 6/1/2018 with prescribing provider:  6-1-18   Future Office Visit:      Statins Protocol Failed - 6/4/2019  6:40 AM        Failed - LDL on file in past 12 months     Recent Labs   Lab Test 06/01/18  0825   *             Failed - Recent (12 mo) or future (30 days) visit within the authorizing provider's specialty     Patient had office visit in the last 12 months or has a visit in the next 30 days with authorizing provider or within the authorizing provider's specialty.  See \"Patient Info\" tab in inbasket, or \"Choose " "Columns\" in Meds & Orders section of the refill encounter.              Passed - No abnormal creatine kinase in past 12 months     No lab results found.             Passed - Medication is active on med list        Passed - Patient is age 18 or older        "

## 2019-06-05 RX ORDER — GLIPIZIDE 10 MG/1
10 TABLET, FILM COATED, EXTENDED RELEASE ORAL DAILY
OUTPATIENT
Start: 2019-06-05

## 2019-06-05 RX ORDER — ATORVASTATIN CALCIUM 10 MG/1
10 TABLET, FILM COATED ORAL DAILY
OUTPATIENT
Start: 2019-06-05

## 2019-06-05 RX ORDER — HYDROCHLOROTHIAZIDE 25 MG/1
25 TABLET ORAL DAILY
OUTPATIENT
Start: 2019-06-05

## 2019-06-05 NOTE — TELEPHONE ENCOUNTER
Patient is due for diabetes follow up, fasting labs, BP check and asthma follow up.   #30 day supply send on 5/22/19.  2nd message sent to pharmacy.     Winifred Du RN, BSN, PHN

## 2019-07-25 ENCOUNTER — TELEPHONE (OUTPATIENT)
Dept: FAMILY MEDICINE | Facility: CLINIC | Age: 34
End: 2019-07-25

## 2019-07-25 ENCOUNTER — OFFICE VISIT (OUTPATIENT)
Dept: FAMILY MEDICINE | Facility: CLINIC | Age: 34
End: 2019-07-25
Payer: COMMERCIAL

## 2019-07-25 VITALS
WEIGHT: 215 LBS | DIASTOLIC BLOOD PRESSURE: 84 MMHG | TEMPERATURE: 96.6 F | OXYGEN SATURATION: 96 % | SYSTOLIC BLOOD PRESSURE: 128 MMHG | HEART RATE: 81 BPM | HEIGHT: 70 IN | BODY MASS INDEX: 30.78 KG/M2

## 2019-07-25 DIAGNOSIS — I10 BENIGN ESSENTIAL HYPERTENSION: ICD-10-CM

## 2019-07-25 DIAGNOSIS — E11.9 TYPE 2 DIABETES MELLITUS WITHOUT COMPLICATION, WITHOUT LONG-TERM CURRENT USE OF INSULIN (H): Primary | ICD-10-CM

## 2019-07-25 DIAGNOSIS — J45.40 MODERATE PERSISTENT ASTHMA WITHOUT COMPLICATION: ICD-10-CM

## 2019-07-25 DIAGNOSIS — E78.5 HYPERLIPIDEMIA LDL GOAL <100: ICD-10-CM

## 2019-07-25 LAB
ANION GAP SERPL CALCULATED.3IONS-SCNC: 6 MMOL/L (ref 3–14)
BUN SERPL-MCNC: 14 MG/DL (ref 7–30)
CALCIUM SERPL-MCNC: 9.4 MG/DL (ref 8.5–10.1)
CHLORIDE SERPL-SCNC: 100 MMOL/L (ref 94–109)
CHOLEST SERPL-MCNC: 183 MG/DL
CO2 SERPL-SCNC: 30 MMOL/L (ref 20–32)
CREAT SERPL-MCNC: 0.74 MG/DL (ref 0.66–1.25)
CREAT UR-MCNC: 221 MG/DL
GFR SERPL CREATININE-BSD FRML MDRD: >90 ML/MIN/{1.73_M2}
GLUCOSE SERPL-MCNC: 196 MG/DL (ref 70–99)
HBA1C MFR BLD: 8.7 % (ref 0–5.6)
HDLC SERPL-MCNC: 35 MG/DL
LDLC SERPL CALC-MCNC: 96 MG/DL
MICROALBUMIN UR-MCNC: 29 MG/L
MICROALBUMIN/CREAT UR: 12.94 MG/G CR (ref 0–17)
NONHDLC SERPL-MCNC: 148 MG/DL
POTASSIUM SERPL-SCNC: 3.9 MMOL/L (ref 3.4–5.3)
SODIUM SERPL-SCNC: 136 MMOL/L (ref 133–144)
TRIGL SERPL-MCNC: 260 MG/DL

## 2019-07-25 PROCEDURE — 36415 COLL VENOUS BLD VENIPUNCTURE: CPT | Performed by: PHYSICIAN ASSISTANT

## 2019-07-25 PROCEDURE — 82043 UR ALBUMIN QUANTITATIVE: CPT | Performed by: PHYSICIAN ASSISTANT

## 2019-07-25 PROCEDURE — 99214 OFFICE O/P EST MOD 30 MIN: CPT | Performed by: PHYSICIAN ASSISTANT

## 2019-07-25 PROCEDURE — 80048 BASIC METABOLIC PNL TOTAL CA: CPT | Performed by: PHYSICIAN ASSISTANT

## 2019-07-25 PROCEDURE — 80061 LIPID PANEL: CPT | Performed by: PHYSICIAN ASSISTANT

## 2019-07-25 PROCEDURE — 83036 HEMOGLOBIN GLYCOSYLATED A1C: CPT | Performed by: PHYSICIAN ASSISTANT

## 2019-07-25 RX ORDER — ATORVASTATIN CALCIUM 10 MG/1
10 TABLET, FILM COATED ORAL DAILY
Qty: 90 TABLET | Refills: 1 | Status: SHIPPED | OUTPATIENT
Start: 2019-07-25 | End: 2020-05-16

## 2019-07-25 RX ORDER — METFORMIN HCL 500 MG
2000 TABLET, EXTENDED RELEASE 24 HR ORAL
Qty: 360 TABLET | Refills: 1 | Status: SHIPPED | OUTPATIENT
Start: 2019-07-25 | End: 2019-10-24

## 2019-07-25 RX ORDER — GLIPIZIDE 10 MG/1
10 TABLET, FILM COATED, EXTENDED RELEASE ORAL
Qty: 180 TABLET | Refills: 0 | Status: SHIPPED | OUTPATIENT
Start: 2019-07-25 | End: 2019-10-24

## 2019-07-25 RX ORDER — GLIPIZIDE 10 MG/1
10 TABLET, FILM COATED, EXTENDED RELEASE ORAL DAILY
Qty: 90 TABLET | Refills: 1 | Status: SHIPPED | OUTPATIENT
Start: 2019-07-25 | End: 2019-07-25

## 2019-07-25 RX ORDER — HYDROCHLOROTHIAZIDE 25 MG/1
25 TABLET ORAL DAILY
Qty: 90 TABLET | Refills: 1 | Status: SHIPPED | OUTPATIENT
Start: 2019-07-25 | End: 2020-05-16

## 2019-07-25 ASSESSMENT — MIFFLIN-ST. JEOR: SCORE: 1920.45

## 2019-07-25 ASSESSMENT — PAIN SCALES - GENERAL: PAINLEVEL: NO PAIN (0)

## 2019-07-25 NOTE — NURSING NOTE
"Chief Complaint   Patient presents with     Diabetes     Asthma       Initial /84   Pulse 81   Temp 96.6  F (35.9  C) (Tympanic)   Ht 1.784 m (5' 10.25\")   Wt 97.5 kg (215 lb)   SpO2 96%   BMI 30.63 kg/m   Estimated body mass index is 30.63 kg/m  as calculated from the following:    Height as of this encounter: 1.784 m (5' 10.25\").    Weight as of this encounter: 97.5 kg (215 lb).  Medication Reconciliation: complete    VIRGIE Franco MA    "

## 2019-07-25 NOTE — LETTER
My Asthma Action Plan  Name: Tio Merino   YOB: 1985  Date: 7/25/2019   My doctor: Kristen M. Kehr, PA-C   My clinic: [facility]        My Control Medicine: Fluticasone + salmeterol (Advair) -  Diskus 100/50 mcg bid  My Rescue Medicine: Albuterol (Proair/Ventolin/Proventil) inhaler prn   My Asthma Severity: mild persistent  Avoid your asthma triggers: humidity and exercise or sports               GREEN ZONE   Good Control    I feel good    No cough or wheeze    Can work, sleep and play without asthma symptoms       Take your asthma control medicine every day.     1. If exercise triggers your asthma, take your rescue medication    15 minutes before exercise or sports, and    During exercise if you have asthma symptoms  2. Spacer to use with inhaler: If you have a spacer, make sure to use it with your inhaler             YELLOW ZONE Getting Worse  I have ANY of these:    I do not feel good    Cough or wheeze    Chest feels tight    Wake up at night   1. Keep taking your Green Zone medications  2. Start taking your rescue medicine:    every 20 minutes for up to 1 hour. Then every 4 hours for 24-48 hours.  3. If you stay in the Yellow Zone for more than 12-24 hours, contact your doctor.  4. If you do not return to the Green Zone in 12-24 hours or you get worse, start taking your oral steroid medicine if prescribed by your provider.           RED ZONE Medical Alert - Get Help  I have ANY of these:    I feel awful    Medicine is not helping    Breathing getting harder    Trouble walking or talking    Nose opens wide to breathe       1. Take your rescue medicine NOW  2. If your provider has prescribed an oral steroid medicine, start taking it NOW  3. Call your doctor NOW  4. If you are still in the Red Zone after 20 minutes and you have not reached your doctor:    Take your rescue medicine again and    Call 911 or go to the emergency room right away    See your regular doctor within 2  weeks of an Emergency Room or Urgent Care visit for follow-up treatment.          Annual Reminders:  Meet with Asthma Educator,  Flu Shot in the Fall, consider Pneumonia Vaccination for patients with asthma (aged 19 and older).    Pharmacy:    [facility] [place], [place] - [address] [facility] - [place], [place] - [address]                      Asthma Triggers  How To Control Things That Make Your Asthma Worse    Triggers are things that make your asthma worse.  Look at the list below to help you find your triggers and what you can do about them.  You can help prevent asthma flare-ups by staying away from your triggers.      Trigger                                                          What you can do   Cigarette Smoke  Tobacco smoke can make asthma worse. Do not allow smoking in your home, car or around you.  Be sure no one smokes at a child s day care or school.  If you smoke, ask your health care provider for ways to help you quit.  Ask family members to quit too.  Ask your health care provider for a referral to Quit Plan to help you quit smoking, or call 1-654-631-PLAN.     Colds, Flu, Bronchitis  These are common triggers of asthma. Wash your hands often.  Don t touch your eyes, nose or mouth.  Get a flu shot every year.     Dust Mites  These are tiny bugs that live in cloth or carpet. They are too small to see. Wash sheets and blankets in hot water every week.   Encase pillows and mattress in dust mite proof covers.  Avoid having carpet if you can. If you have carpet, vacuum weekly.   Use a dust mask and HEPA vacuum.   Pollen and Outdoor Mold  Some people are allergic to trees, grass, or weed pollen, or molds. Try to keep your windows closed.  Limit time out doors when pollen count is high.   Ask you health care provider about taking medicine during allergy season.     Animal Dander  Some people are  allergic to skin flakes, urine or saliva from pets with fur or feathers. Keep pets with fur or feathers out of your home.    If you can t keep the pet outdoors, then keep the pet out of your bedroom.  Keep the bedroom door closed.  Keep pets off cloth furniture and away from stuffed toys.     Mice, Rats, and Cockroaches  Some people are allergic to the waste from these pests.   Cover food and garbage.  Clean up spills and food crumbs.  Store grease in the refrigerator.   Keep food out of the bedroom.   Indoor Mold  This can be a trigger if your home has high moisture. Fix leaking faucets, pipes, or other sources of water.   Clean moldy surfaces.  Dehumidify basement if it is damp and smelly.   Smoke, Strong Odors, and Sprays  These can reduce air quality. Stay away from strong odors and sprays, such as perfume, powder, hair spray, paints, smoke incense, paint, cleaning products, candles and new carpet.   Exercise or Sports  Some people with asthma have this trigger. Be active!  Ask your doctor about taking medicine before sports or exercise to prevent symptoms.    Warm up for 5-10 minutes before and after sports or exercise.     Other Triggers of Asthma  Cold air:  Cover your nose and mouth with a scarf.  Sometimes laughing or crying can be a trigger.  Some medicines and food can trigger asthma.

## 2019-07-25 NOTE — LETTER
July 30, 2019    Tio CARLOS EDUARDO Merino  [address]  [place] [address]        Tio,   Your hemoglobin A1C is above 7%   I am going to have you increase the glipizide medication to twice daily .   I already sent a new prescription and the nurse will also be contacting you.   Your cholesterol and kidney function tests look good.   Plan to see your primary provider in 3 months.   Kristen Kehr PA-C       Results for orders placed or performed in visit on 07/25/19   HEMOGLOBIN A1C   Result Value Ref Range    Hemoglobin A1C 8.7 (H) 0 - 5.6 %   Lipid panel reflex to direct LDL Fasting   Result Value Ref Range    Cholesterol 183 <200 mg/dL    Triglycerides 260 (H) <150 mg/dL    HDL Cholesterol 35 (L) >39 mg/dL    LDL Cholesterol Calculated 96 <100 mg/dL    Non HDL Cholesterol 148 (H) <130 mg/dL   BASIC METABOLIC PANEL   Result Value Ref Range    Sodium 136 133 - 144 mmol/L    Potassium 3.9 3.4 - 5.3 mmol/L    Chloride 100 94 - 109 mmol/L    Carbon Dioxide 30 20 - 32 mmol/L    Anion Gap 6 3 - 14 mmol/L    Glucose 196 (H) 70 - 99 mg/dL    Urea Nitrogen 14 7 - 30 mg/dL    Creatinine 0.74 0.66 - 1.25 mg/dL    GFR Estimate >90 >60 mL/min/[1.73_m2]    GFR Estimate If Black >90 >60 mL/min/[1.73_m2]    Calcium 9.4 8.5 - 10.1 mg/dL   Albumin Random Urine Quantitative with Creat Ratio   Result Value Ref Range    Creatinine Urine 221 mg/dL    Albumin Urine mg/L 29 mg/L    Albumin Urine mg/g Cr 12.94 0 - 17 mg/g Cr

## 2019-07-25 NOTE — TELEPHONE ENCOUNTER
Please contact Tio and let him know that his A1C test is back and it is above 7%.     I sent a new prescription for his glipizide to increase to taking this twice daily.     He has an appointment scheduled with his primary provider in 3 months and should keep that appointment to recheck diabetes and asthma.     Thank you.   Kristen Kehr PA-C                Results for orders placed or performed in visit on 07/25/19   HEMOGLOBIN A1C   Result Value Ref Range    Hemoglobin A1C 8.7 (H) 0 - 5.6 %   Lipid panel reflex to direct LDL Fasting   Result Value Ref Range    Cholesterol 183 <200 mg/dL    Triglycerides 260 (H) <150 mg/dL    HDL Cholesterol 35 (L) >39 mg/dL    LDL Cholesterol Calculated 96 <100 mg/dL    Non HDL Cholesterol 148 (H) <130 mg/dL   BASIC METABOLIC PANEL   Result Value Ref Range    Sodium 136 133 - 144 mmol/L    Potassium 3.9 3.4 - 5.3 mmol/L    Chloride 100 94 - 109 mmol/L    Carbon Dioxide 30 20 - 32 mmol/L    Anion Gap 6 3 - 14 mmol/L    Glucose 196 (H) 70 - 99 mg/dL    Urea Nitrogen 14 7 - 30 mg/dL    Creatinine 0.74 0.66 - 1.25 mg/dL    GFR Estimate >90 >60 mL/min/[1.73_m2]    GFR Estimate If Black >90 >60 mL/min/[1.73_m2]    Calcium 9.4 8.5 - 10.1 mg/dL   Albumin Random Urine Quantitative with Creat Ratio   Result Value Ref Range    Creatinine Urine 221 mg/dL    Albumin Urine mg/L PENDING mg/L    Albumin Urine mg/g Cr PENDING 0 - 17 mg/g Cr

## 2019-07-25 NOTE — PROGRESS NOTES
Subjective     Tio Merino is a 34 year old male who presents to clinic today for the following health issues:'    Tio was scheduled today for medication refills / check.   His primary provider did not have an appointment available within the next week.   He has diabetes type 2 and is on metformin and glipizide.   He checks his blood sugars randomly and reports sugars in the 120s.   He is also taking hydrochlorothiazide for hypertension and lipitor for hyperlipidemia.   Asthma is uncontrolled. He was on a daily inhaler, he thinks it was advair, but has been off for months. He is using the albuterol daily. He also had singulair, but did not take it.       History of Present Illness      Asthma:  He presents for follow up of asthma.  He has no cough, some wheezing, and some shortness of breath. He is using a relief medication 2-3 times per day. He does not have a controller medication. Patient is aware of the following triggers: exercise or sports, humidity and smoke. The patient has not had a visit to the Emergency Room, Urgent Care or Hospital due to asthma since the last clinic visit.     Diabetes:   He presents for follow up of diabetes.  He is checking home blood glucose one time daily. He checks blood glucose before meals.  Blood glucose is sometimes over 200 and sometimes over 70. He is aware of hypoglycemia symptoms including shakiness, weakness and blurred vision. He has no concerns regarding his diabetes at this time.  He is not experiencing numbness or burning in feet, excessive thirst, blurry vision, weight changes or redness, sores or blisters on feet. The patient has had a diabetic eye exam in the last 12 months.     Diabetes Management Resources    Hypertension: He presents for follow up of hypertension.  He does check blood pressure  regularly outside of the clinic. Outpatient blood pressures have not been over 140/90. He follows a low salt diet.     He eats 0-1 servings of fruits and  vegetables daily.He consumes 0 sweetened beverage(s) daily.  He is taking medications regularly.       Patient Active Problem List   Diagnosis     Moderate persistent asthma     Type 2 diabetes mellitus (H)     Hyperlipidemia LDL goal <100     Benign essential hypertension     History reviewed. No pertinent surgical history.    Social History     Tobacco Use     Smoking status: Never Smoker     Smokeless tobacco: Never Used   Substance Use Topics     Alcohol use: Yes     Comment: once a week tops     Family History   Problem Relation Age of Onset     Diabetes Maternal Grandfather      Hypertension Maternal Grandfather      Diabetes Other         multiple people on mother's side     Hypertension Mother      Hypertension Maternal Grandmother      Diabetes Other      Asthma Paternal Grandfather      Colon Cancer No family hx of      Prostate Cancer No family hx of      Asthma No family hx of          Current Outpatient Medications   Medication Sig Dispense Refill     albuterol (PROAIR HFA/PROVENTIL HFA/VENTOLIN HFA) 108 (90 Base) MCG/ACT inhaler Inhale 2 puffs into the lungs every 6 hours 8.5 g 11     ASPIRIN NOT PRESCRIBED (INTENTIONAL) Please choose reason not prescribed, below 0 each 0     atorvastatin (LIPITOR) 10 MG tablet Take 1 tablet (10 mg) by mouth daily 90 tablet 1     fluticasone-salmeterol (ADVAIR DISKUS) 100-50 MCG/DOSE inhaler Inhale 1 puff into the lungs every 12 hours 3 Inhaler 1     glipiZIDE (GLUCOTROL XL) 10 MG 24 hr tablet Take 1 tablet (10 mg) by mouth daily 90 tablet 1     hydrochlorothiazide (HYDRODIURIL) 25 MG tablet Take 1 tablet (25 mg) by mouth daily 90 tablet 1     metFORMIN (GLUCOPHAGE-XR) 500 MG 24 hr tablet Take 4 tablets (2,000 mg) by mouth daily (with dinner) 360 tablet 1     montelukast (SINGULAIR) 10 MG tablet Take 1 tablet (10 mg) by mouth At Bedtime (Patient not taking: Reported on 7/25/2019) 90 tablet 3     Allergies   Allergen Reactions     Lisinopril Cough     BP Readings from  "Last 3 Encounters:   07/25/19 128/84   06/01/18 135/83   09/11/17 (!) 136/92    Wt Readings from Last 3 Encounters:   07/25/19 97.5 kg (215 lb)   06/01/18 98.9 kg (218 lb)   09/11/17 99.9 kg (220 lb 3.2 oz)                    Reviewed and updated as needed this visit by Provider         Review of Systems   ROS COMP: Constitutional, HEENT, cardiovascular, pulmonary, GI, , musculoskeletal, neuro, skin, endocrine and psych systems are negative, except as otherwise noted.      Objective    /84   Pulse 81   Temp 96.6  F (35.9  C) (Tympanic)   Ht 1.784 m (5' 10.25\")   Wt 97.5 kg (215 lb)   SpO2 96%   BMI 30.63 kg/m    Body mass index is 30.63 kg/m .  Physical Exam   GENERAL: healthy, alert and no distress  RESP: lungs clear to auscultation - no rales, rhonchi or wheezes  CV: regular rate and rhythm, normal S1 S2, no S3 or S4, no murmur, click or rub, no peripheral edema and peripheral pulses strong  MS: no gross musculoskeletal defects noted, no edema  SKIN: no suspicious lesions or rashes  NEURO: Normal strength and tone, mentation intact and speech normal  PSYCH: mentation appears normal, affect normal/bright    Diagnostic Test Results:  Labs reviewed in Epic        Assessment & Plan     1. Type 2 diabetes mellitus without complication, without long-term current use of insulin (H)  If A1C is above 7%, then adjustments will need to be made. He was given refills today since he is out of his medication.   If adjustment is made, then he will need to be seen in 3 months by his primary provider.   If A1C is less than 7%, then he will continue his medication and see his primary provider in 6 months.   - HEMOGLOBIN A1C  - BASIC METABOLIC PANEL  - Albumin Random Urine Quantitative with Creat Ratio  - glipiZIDE (GLUCOTROL XL) 10 MG 24 hr tablet; Take 1 tablet (10 mg) by mouth daily  Dispense: 90 tablet; Refill: 1  - metFORMIN (GLUCOPHAGE-XR) 500 MG 24 hr tablet; Take 4 tablets (2,000 mg) by mouth daily (with dinner) " " Dispense: 360 tablet; Refill: 1    2. Hyperlipidemia LDL goal <100  Refills given  - atorvastatin (LIPITOR) 10 MG tablet; Take 1 tablet (10 mg) by mouth daily  Dispense: 90 tablet; Refill: 1  - Lipid panel reflex to direct LDL Fasting    3. Benign essential hypertension  Refills given, blood pressure is well controlled.   - hydrochlorothiazide (HYDRODIURIL) 25 MG tablet; Take 1 tablet (25 mg) by mouth daily  Dispense: 90 tablet; Refill: 1    4. Moderate persistent asthma without complication  Not well controlled.   ACT is 12.   He is going to start back on a daily inhaler. Prescription sent for Advair.   Plan follow up with his primary provider in 3 months.   The appointment is scheduled.   - fluticasone-salmeterol (ADVAIR DISKUS) 100-50 MCG/DOSE inhaler; Inhale 1 puff into the lungs every 12 hours  Dispense: 3 Inhaler; Refill: 1     BMI:   Estimated body mass index is 30.63 kg/m  as calculated from the following:    Height as of this encounter: 1.784 m (5' 10.25\").    Weight as of this encounter: 97.5 kg (215 lb).   Weight management plan: Discussed healthy diet and exercise guidelines          Return in about 3 months (around 10/25/2019) for medication check, diabetes, asthma.         ADDENDUM:   12:49 PM July 25, 2019  A1C is above 7%  Glipizide dose increased  Patient will be contacted and new prescription sent   Follow up with PCP in 3 months.   Kristen Kehr PA-C      "

## 2019-07-26 ASSESSMENT — ASTHMA QUESTIONNAIRES: ACT_TOTALSCORE: 12

## 2019-07-29 NOTE — TELEPHONE ENCOUNTER
Left message on answering machine for patient/parent to call back.   [phone].  Alexandra Nicole RN

## 2019-07-29 NOTE — TELEPHONE ENCOUNTER
Left message on answering machine for patient/parent to call back.   [phone].  Alexandra Nicole RN

## 2019-07-30 ENCOUNTER — TELEPHONE (OUTPATIENT)
Dept: FAMILY MEDICINE | Facility: CLINIC | Age: 34
End: 2019-07-30

## 2019-07-30 NOTE — TELEPHONE ENCOUNTER
Information below is reviewed with Kristen Kehr, PA-C, please send result notes(below) to patient via letter.    Then please route to provider to cosign Verbal Orders.    Alexandra Nicole RN

## 2019-08-03 DIAGNOSIS — E11.9 TYPE 2 DIABETES MELLITUS WITHOUT COMPLICATION, WITHOUT LONG-TERM CURRENT USE OF INSULIN (H): ICD-10-CM

## 2019-08-05 RX ORDER — METFORMIN HCL 500 MG
2000 TABLET, EXTENDED RELEASE 24 HR ORAL
Qty: 360 TABLET | Refills: 1 | Status: SHIPPED | OUTPATIENT
Start: 2019-08-05 | End: 2019-10-24

## 2019-08-05 NOTE — TELEPHONE ENCOUNTER
Requested Prescriptions   Pending Prescriptions Disp Refills     metFORMIN (GLUCOPHAGE-XR) 500 MG 24 hr tablet [Pharmacy Med Name: METFORMIN  MG TAB]  Last Written Prescription Date:  07/25/19  Last Fill Quantity: 360,  # refills: 1   Last office visit: 06/01/18 with prescribing provider:  ANTONIO Banuelos   Future Office Visit:   Next 5 appointments (look out 90 days)    Oct 24, 2019  7:20 AM CDT  SHORT with Danyell Banuelos NP  [facility] ([facility] [address] [facility] [id number] [phone]                Sig: Take 4 tablets (2,000 mg) by mouth daily (with dinner)       Biguanide Agents Passed - 8/3/2019  6:05 AM        Passed - Blood pressure less than 140/90 in past 6 months     BP Readings from Last 3 Encounters:   07/25/19 128/84   06/01/18 135/83   09/11/17 (!) 136/92                 Passed - Patient has documented LDL within the past 12 mos.     Recent Labs   Lab Test 07/25/19  0847   LDL 96             Passed - Patient has had a Microalbumin in the past 15 mos.     Recent Labs   Lab Test 07/25/19  0850   MICROL 29   UMALCR 12.94             Passed - Patient is age 10 or older        Passed - Patient has documented A1c within the specified period of time.     If HgbA1C is 8 or greater, it needs to be on file within the past 3 months.  If less than 8, must be on file within the past 6 months.     Recent Labs   Lab Test 07/25/19  0847   A1C 8.7*             Passed - Patient's CR is NOT>1.4 OR Patient's EGFR is NOT<45 within past 12 mos.     Recent Labs   Lab Test 07/25/19  0847   GFRESTIMATED >90   GFRESTBLACK >90       Recent Labs   Lab Test 07/25/19  0847   CR 0.74             Passed - Patient does NOT have a diagnosis of CHF.        Passed - Medication is active on med list        Passed - Recent (6 mo) or future (30 days) visit within the authorizing provider's specialty     Patient had office visit in the last 6 months or has a visit in the next 30 days with  "authorizing provider or within the authorizing provider's specialty.  See \"Patient Info\" tab in inbasket, or \"Choose Columns\" in Meds & Orders section of the refill encounter.              "

## 2019-09-25 ENCOUNTER — OFFICE VISIT (OUTPATIENT)
Dept: ORTHOPEDICS | Facility: CLINIC | Age: 34
End: 2019-09-25

## 2019-09-25 ENCOUNTER — ANCILLARY PROCEDURE (OUTPATIENT)
Dept: GENERAL RADIOLOGY | Facility: CLINIC | Age: 34
End: 2019-09-25
Attending: PEDIATRICS
Payer: COMMERCIAL

## 2019-09-25 VITALS
WEIGHT: 217 LBS | BODY MASS INDEX: 31.07 KG/M2 | HEIGHT: 70 IN | DIASTOLIC BLOOD PRESSURE: 84 MMHG | SYSTOLIC BLOOD PRESSURE: 124 MMHG

## 2019-09-25 DIAGNOSIS — M25.512 LEFT SHOULDER PAIN, UNSPECIFIED CHRONICITY: ICD-10-CM

## 2019-09-25 DIAGNOSIS — M25.512 LEFT SHOULDER PAIN, UNSPECIFIED CHRONICITY: Primary | ICD-10-CM

## 2019-09-25 PROCEDURE — 73030 X-RAY EXAM OF SHOULDER: CPT | Mod: LT

## 2019-09-25 PROCEDURE — 99204 OFFICE O/P NEW MOD 45 MIN: CPT | Performed by: PEDIATRICS

## 2019-09-25 ASSESSMENT — MIFFLIN-ST. JEOR: SCORE: 1929.53

## 2019-09-25 NOTE — LETTER
9/25/2019         RE: Tio Merino  [address]   [place] [address]        Dear Colleague,    Thank you for referring your patient, Tio Merino, to the [facility]. Please see a copy of my visit note below.    Sports Medicine Clinic Visit    PCP: Danyell Banuelos    Tio Merino is a 34 year old male who is seen  as a self referral presenting with left shoulder pain.  After throwing a ball in July, he felt a pop and had pain over the lateral and posterior aspects of his shoulder.  He has continued to play softball, but has been unable to overhand throw.  He has continued to have pain in his shoulder, unable to reach overhead, throw, lifting.   He is left hand dominant.    Does play a lot of softball.      **  Patient has continuous pain since onset, but denies any further popping. Pain worsens with throwing and placing arm above his head. Pain is located in the left posterior and lateral shoulder.  Denies bruising or swelling. Denies past shoulder injury     Injury: throwing     Location of Pain: left shoulder   Duration of Pain: 2+ month(s)  Rating of Pain at worst: 8/10  Rating of Pain Currently: 2/10  Symptoms are better with: Rest  Symptoms are worse with: any use of his shoulder, trying to support himself with his arm   Additional Features:   Positive: popping, paresthesias and weakness   Negative: swelling, bruising, grinding, catching, locking, numbness, pain in other joints and systemic symptoms  Other evaluation and/or treatments so far consists of: Nothing  Prior History of related problems: denies     Social History: desk job     Review of Systems  Musculoskeletal: as above  Remainder of review of systems is negative including constitutional, CV, pulmonary, GI, Skin and Neurologic except as noted in HPI or medical history.    Past Medical History:   Diagnosis Date     Asthma      Diabetes (H) 3/20/2014     Hypertension 4/12/2017     PSHx:  Noncontributory    Family History   Problem Relation Age of Onset     Diabetes Maternal Grandfather      Hypertension Maternal Grandfather      Diabetes Other         multiple people on mother's side     Hypertension Mother      Hypertension Maternal Grandmother      Diabetes Other      Asthma Paternal Grandfather      Colon Cancer No family hx of      Prostate Cancer No family hx of      Asthma No family hx of      Social History     Socioeconomic History     Marital status: Single     Spouse name: Not on file     Number of children: Not on file     Years of education: Not on file     Highest education level: Not on file   Occupational History     Not on file   Social Needs     Financial resource strain: Not on file     Food insecurity:     Worry: Not on file     Inability: Not on file     Transportation needs:     Medical: Not on file     Non-medical: Not on file   Tobacco Use     Smoking status: Never Smoker     Smokeless tobacco: Never Used   Substance and Sexual Activity     Alcohol use: Yes     Comment: once a week tops     Drug use: No     Sexual activity: Yes     Partners: Female     Birth control/protection: Pill   Lifestyle     Physical activity:     Days per week: Not on file     Minutes per session: Not on file     Stress: Not on file   Relationships     Social connections:     Talks on phone: Not on file     Gets together: Not on file     Attends Evangelical service: Not on file     Active member of club or organization: Not on file     Attends meetings of clubs or organizations: Not on file     Relationship status: Not on file     Intimate partner violence:     Fear of current or ex partner: Not on file     Emotionally abused: Not on file     Physically abused: Not on file     Forced sexual activity: Not on file   Other Topics Concern     Parent/sibling w/ CABG, MI or angioplasty before 65F 55M? No   Social History Narrative     Not on file   This document serves as a record of the services and decisions  "personally performed and made by DO AKBAR Shaw. It was created on his behalf by Claudine Henao, a trained medical scribe. The creation of this document is based the provider's statements to the medical scribe.    Eliseo Henao 9:33 AM 9/25/2019      Objective  /84   Ht 1.784 m (5' 10.25\")   Wt 98.4 kg (217 lb)   BMI 30.92 kg/m       GENERAL APPEARANCE: healthy, alert and no distress   GAIT: NORMAL  SKIN: no suspicious lesions or rashes  NEURO: Normal strength and tone, mentation intact and speech normal  PSYCH:  mentation appears normal and affect normal/bright  HEENT: no scleral icterus  CV: distal perfusion intact  RESP: nonlabored breathing    Left Shoulder exam    ROM:        forward flexion Lacking 10-15 degrees with pain        Abduction to ~140-150 degrees with pain       internal rotation 2-3 vertebral segments lower than right with pain       external rotation lacking 10-20 degrees compared to right, with pain  Pain located lateral left shoulder with above ROM    Tender:        No focal tenderness    Strength:        abduction grossly symmetric       internal rotation 5-/5, slight pain       external rotation 5-/5, no pain    Impingement testing:       positive (+) Leal with pain        positive (+) empty can with pain; able to resist       positive (+) O'malini with pain lateral and posterior; no pain thumb up      Stability testing:       neg (-) apprehension; slight tingling reported       neg (-) sulcus sign       neg (-) posterior compression    Skin:       no visible deformities       well perfused       capillary refill brisk    Sensation:        normal sensation over shoulder and upper extremity       Radiology  Visualized radiographs of left shoulder obtained today, and reviewed the images with the patient.  Impression: no acute fracture noted. GH joint space appears preserved.  XR Shoulder Left G/E 3 Views    Narrative    SHOULDER LEFT THREE OR MORE VIEWS    9/25/2019 " 9:18 AM     HISTORY: Left shoulder pain, unspecified chronicity.    COMPARISON: None.      Impression    IMPRESSION: Acromioclavicular joint and glenohumeral joints are  unremarkable. No fracture or subluxation.    SPENSER GENAO MD         Assessment:  1. Left shoulder pain, unspecified chronicity        Plan:  Discussed the assessment with the patient.  Ongoing pain since initial injury in the summer, with limited motion.  Differential includes rotator cuff injury, possibly labrum though exam findings I think point more towards possible cuff injury.  We discussed the following: symptom treatment, activity modification/rest, imaging and rehab. Following discussion, plan:  Ice, Over the counter medications as needed   Activity modification discussed   Imaging: MRI ordered today.   Plan MRI left shoulder next given persistent pain and dysfunction.  Rehab: physical therapy offered; pending MRI results.    May consider PT pending MRI.  Injection: Discussed cortisone injection; pending MRI results   Would prefer to obtain MRI first, likely trial therapy next, before considering injection.  Follow up: Clinic will contact patient with MRI results  Questions answered. The patient indicates understanding of these issues and agrees with the plan.    Elijah Ospina DO, AKBAR              Disclaimer: This note consists of symbols derived from keyboarding, dictation and/or voice recognition software. As a result, there may be errors in the script that have gone undetected. Please consider this when interpreting information found in this chart.    The information in this document, created by a scribe for me, accurately reflects the services I personally performed and the decisions made by me. I have reviewed and approved this document for accuracy.       Again, thank you for allowing me to participate in the care of your patient.        Sincerely,        Elijah Ospina DO

## 2019-09-25 NOTE — PATIENT INSTRUCTIONS
MRI left shoulder next  Clinic will contact you with results and next steps     Advanced imaging is done by appointment. Some insurance require a prior authorization to be completed which may delay the time until you are able to schedule your appointment.  Please call [place] [place] [facility] [place]: [phone] to schedule your MRI.  Depending on your availability you can usually schedule within the next 1-2 days.    The clinic will call you with results, if you have not heard from the clinic within 3-4 days following your MRI please contact us at the number listed below.   If you have any further questions for your physician or physician s care team you can call [phone] and use option 3 to leave a voice message. Calls received during business hours will be returned same day.

## 2019-09-25 NOTE — PROGRESS NOTES
Sports Medicine Clinic Visit    PCP: Danyell Banuelos Angelique is a 34 year old male who is seen  as a self referral presenting with left shoulder pain.  After throwing a ball in July, he felt a pop and had pain over the lateral and posterior aspects of his shoulder.  He has continued to play softball, but has been unable to overhand throw.  He has continued to have pain in his shoulder, unable to reach overhead, throw, lifting.   He is left hand dominant.    Does play a lot of softball.      **  Patient has continuous pain since onset, but denies any further popping. Pain worsens with throwing and placing arm above his head. Pain is located in the left posterior and lateral shoulder.  Denies bruising or swelling. Denies past shoulder injury     Injury: throwing     Location of Pain: left shoulder   Duration of Pain: 2+ month(s)  Rating of Pain at worst: 8/10  Rating of Pain Currently: 2/10  Symptoms are better with: Rest  Symptoms are worse with: any use of his shoulder, trying to support himself with his arm   Additional Features:   Positive: popping, paresthesias and weakness   Negative: swelling, bruising, grinding, catching, locking, numbness, pain in other joints and systemic symptoms  Other evaluation and/or treatments so far consists of: Nothing  Prior History of related problems: denies     Social History: desk job     Review of Systems  Musculoskeletal: as above  Remainder of review of systems is negative including constitutional, CV, pulmonary, GI, Skin and Neurologic except as noted in HPI or medical history.    Past Medical History:   Diagnosis Date     Asthma      Diabetes (H) 3/20/2014     Hypertension 4/12/2017     PSHx: Noncontributory    Family History   Problem Relation Age of Onset     Diabetes Maternal Grandfather      Hypertension Maternal Grandfather      Diabetes Other         multiple people on mother's side     Hypertension Mother      Hypertension Maternal Grandmother       Diabetes Other      Asthma Paternal Grandfather      Colon Cancer No family hx of      Prostate Cancer No family hx of      Asthma No family hx of      Social History     Socioeconomic History     Marital status: Single     Spouse name: Not on file     Number of children: Not on file     Years of education: Not on file     Highest education level: Not on file   Occupational History     Not on file   Social Needs     Financial resource strain: Not on file     Food insecurity:     Worry: Not on file     Inability: Not on file     Transportation needs:     Medical: Not on file     Non-medical: Not on file   Tobacco Use     Smoking status: Never Smoker     Smokeless tobacco: Never Used   Substance and Sexual Activity     Alcohol use: Yes     Comment: once a week tops     Drug use: No     Sexual activity: Yes     Partners: Female     Birth control/protection: Pill   Lifestyle     Physical activity:     Days per week: Not on file     Minutes per session: Not on file     Stress: Not on file   Relationships     Social connections:     Talks on phone: Not on file     Gets together: Not on file     Attends Zoroastrianism service: Not on file     Active member of club or organization: Not on file     Attends meetings of clubs or organizations: Not on file     Relationship status: Not on file     Intimate partner violence:     Fear of current or ex partner: Not on file     Emotionally abused: Not on file     Physically abused: Not on file     Forced sexual activity: Not on file   Other Topics Concern     Parent/sibling w/ CABG, MI or angioplasty before 65F 55M? No   Social History Narrative     Not on file   This document serves as a record of the services and decisions personally performed and made by DO AKBAR Shaw. It was created on his behalf by Claudine Henao, a trained medical scribe. The creation of this document is based the provider's statements to the medical scribe.    Eliseo Henao 9:33 AM 9/25/2019   "    Objective  /84   Ht 1.784 m (5' 10.25\")   Wt 98.4 kg (217 lb)   BMI 30.92 kg/m      GENERAL APPEARANCE: healthy, alert and no distress   GAIT: NORMAL  SKIN: no suspicious lesions or rashes  NEURO: Normal strength and tone, mentation intact and speech normal  PSYCH:  mentation appears normal and affect normal/bright  HEENT: no scleral icterus  CV: distal perfusion intact  RESP: nonlabored breathing    Left Shoulder exam    ROM:        forward flexion Lacking 10-15 degrees with pain        Abduction to ~140-150 degrees with pain       internal rotation 2-3 vertebral segments lower than right with pain       external rotation lacking 10-20 degrees compared to right, with pain  Pain located lateral left shoulder with above ROM    Tender:        No focal tenderness    Strength:        abduction grossly symmetric       internal rotation 5-/5, slight pain       external rotation 5-/5, no pain    Impingement testing:       positive (+) Leal with pain        positive (+) empty can with pain; able to resist       positive (+) O'malini with pain lateral and posterior; no pain thumb up      Stability testing:       neg (-) apprehension; slight tingling reported       neg (-) sulcus sign       neg (-) posterior compression    Skin:       no visible deformities       well perfused       capillary refill brisk    Sensation:        normal sensation over shoulder and upper extremity       Radiology  Visualized radiographs of left shoulder obtained today, and reviewed the images with the patient.  Impression: no acute fracture noted. GH joint space appears preserved.  XR Shoulder Left G/E 3 Views    Narrative    SHOULDER LEFT THREE OR MORE VIEWS    9/25/2019 9:18 AM     HISTORY: Left shoulder pain, unspecified chronicity.    COMPARISON: None.      Impression    IMPRESSION: Acromioclavicular joint and glenohumeral joints are  unremarkable. No fracture or subluxation.    SPENSER GENAO MD         Assessment:  1. Left " shoulder pain, unspecified chronicity        Plan:  Discussed the assessment with the patient.  Ongoing pain since initial injury in the summer, with limited motion.  Differential includes rotator cuff injury, possibly labrum though exam findings I think point more towards possible cuff injury.  We discussed the following: symptom treatment, activity modification/rest, imaging and rehab. Following discussion, plan:  Ice, Over the counter medications as needed   Activity modification discussed   Imaging: MRI ordered today.   Plan MRI left shoulder next given persistent pain and dysfunction.  Rehab: physical therapy offered; pending MRI results.    May consider PT pending MRI.  Injection: Discussed cortisone injection; pending MRI results   Would prefer to obtain MRI first, likely trial therapy next, before considering injection.  Follow up: Clinic will contact patient with MRI results  Questions answered. The patient indicates understanding of these issues and agrees with the plan.    Elijah Ospina DO, CAQ              Disclaimer: This note consists of symbols derived from keyboarding, dictation and/or voice recognition software. As a result, there may be errors in the script that have gone undetected. Please consider this when interpreting information found in this chart.    The information in this document, created by a scribe for me, accurately reflects the services I personally performed and the decisions made by me. I have reviewed and approved this document for accuracy.

## 2019-10-02 ENCOUNTER — TELEPHONE (OUTPATIENT)
Dept: ORTHOPEDICS | Facility: CLINIC | Age: 34
End: 2019-10-02

## 2019-10-02 ENCOUNTER — ANCILLARY PROCEDURE (OUTPATIENT)
Dept: MRI IMAGING | Facility: CLINIC | Age: 34
End: 2019-10-02
Attending: PEDIATRICS
Payer: COMMERCIAL

## 2019-10-02 DIAGNOSIS — M25.512 LEFT SHOULDER PAIN, UNSPECIFIED CHRONICITY: ICD-10-CM

## 2019-10-02 DIAGNOSIS — M25.512 LEFT SHOULDER PAIN, UNSPECIFIED CHRONICITY: Primary | ICD-10-CM

## 2019-10-02 PROCEDURE — 73221 MRI JOINT UPR EXTREM W/O DYE: CPT | Mod: TC

## 2019-10-02 NOTE — TELEPHONE ENCOUNTER
Results for orders placed or performed in visit on 10/02/19   MR Shoulder Left w/o Contrast    Narrative    MRI LEFT SHOULDER WITHOUT CONTRAST  10/2/2019 7:46 AM    HISTORY: Shoulder pain and limited range of motion since June 2019  following an injury.    COMPARISON: Radiographs on 9/25/2019.    TECHNIQUE: Multiplanar MR imaging was performed without contrast.    FINDINGS:  Osseous acromion outlet: There is a type II configuration of the  acromion with no significant lateral or anterior downsloping. There  are no degenerative changes of the acromioclavicular joint. The outlet  is widely patent. No os acromiale.    Rotator cuff: The supraspinatus, infraspinatus, subscapularis, and  teres minor tendons and visualized muscles are normal.     Labrum: No tear or other labral pathology is demonstrated.    Biceps tendon: The biceps tendon is in the bicipital groove. It is  intact and demonstrates normal signal.    Osseous structures and cartilaginous surfaces: No fracture or osseous  lesion is seen. No abnormal marrow signal intensity is identified. The  cartilage surfaces are well preserved.    Additional findings: No joint effusion is demonstrated. There is no  fluid within the subacromial-subdeltoid bursa. The adjacent soft  tissues are unremarkable.      Impression    IMPRESSION: Unremarkable MRI of the left shoulder.    FARRAH KATZ MD

## 2019-10-02 NOTE — TELEPHONE ENCOUNTER
No concerning structural abnormality per report. On my review, I question whether there may be fluid signal between glenoid and superior labrum (series 7, images 8-9). Otherwise, nothing clearly torn or other structural concern in shoulder.  Advise physical therapy next. Would have him monitor course next 4-6 weeks with PT. Pending course with therapy, additional considerations may be injection (primarily for pain relief, but also can be diagnostic)  Thanks.  Elijah Ospina, DO, CAQ

## 2019-10-03 NOTE — TELEPHONE ENCOUNTER
Spoke to patient, discussed findings and recommendations.  He would like to try both injection and PT.  Scheduled appointment for injection and placed order for PT.  Provided scheduling number for PT.    Hillary Stevens, ATC

## 2019-10-08 ENCOUNTER — OFFICE VISIT (OUTPATIENT)
Dept: ORTHOPEDICS | Facility: CLINIC | Age: 34
End: 2019-10-08
Payer: COMMERCIAL

## 2019-10-08 VITALS
DIASTOLIC BLOOD PRESSURE: 78 MMHG | WEIGHT: 217 LBS | BODY MASS INDEX: 31.07 KG/M2 | HEIGHT: 70 IN | SYSTOLIC BLOOD PRESSURE: 118 MMHG

## 2019-10-08 DIAGNOSIS — M25.512 LEFT SHOULDER PAIN, UNSPECIFIED CHRONICITY: Primary | ICD-10-CM

## 2019-10-08 PROCEDURE — 20610 DRAIN/INJ JOINT/BURSA W/O US: CPT | Mod: LT | Performed by: PEDIATRICS

## 2019-10-08 PROCEDURE — 99213 OFFICE O/P EST LOW 20 MIN: CPT | Mod: 25 | Performed by: PEDIATRICS

## 2019-10-08 RX ORDER — LIDOCAINE HYDROCHLORIDE 10 MG/ML
2 INJECTION, SOLUTION INFILTRATION; PERINEURAL
Status: DISCONTINUED | OUTPATIENT
Start: 2019-10-08 | End: 2019-10-24

## 2019-10-08 RX ORDER — TRIAMCINOLONE ACETONIDE 40 MG/ML
40 INJECTION, SUSPENSION INTRA-ARTICULAR; INTRAMUSCULAR
Status: DISCONTINUED | OUTPATIENT
Start: 2019-10-08 | End: 2019-10-24

## 2019-10-08 RX ADMIN — TRIAMCINOLONE ACETONIDE 40 MG: 40 INJECTION, SUSPENSION INTRA-ARTICULAR; INTRAMUSCULAR at 09:25

## 2019-10-08 RX ADMIN — LIDOCAINE HYDROCHLORIDE 2 ML: 10 INJECTION, SOLUTION INFILTRATION; PERINEURAL at 09:25

## 2019-10-08 ASSESSMENT — MIFFLIN-ST. JEOR: SCORE: 1929.53

## 2019-10-08 NOTE — PROGRESS NOTES
"Sports Medicine Clinic Visit    PCP: Danyell Banuelos Angelique is a 34 year old male who is seen in f/u up for Left shoulder pain, unspecified chronicity. Since last visit on 9/25/2019 patient has undergone an MRI.  Here today to review MRI.  He would like to start PT next week and discuss an injection today.      **        Review of Systems  All other systems reviewed and are negative unless noted above.    Past Medical History:   Diagnosis Date     Asthma      Diabetes (H) 3/20/2014     Hypertension 4/12/2017     No significant past surgical history.    This document serves as a record of the services and decisions personally performed and made by DO AKBAR Shaw. It was created on his behalf by Gabriel Licona, a trained medical scribe. The creation of this document is based the provider's statements to the medical scribe.    Scribe Gabriel Licona 9:50 AM 10/8/2019     Objective  /78   Ht 1.784 m (5' 10.25\")   Wt 98.4 kg (217 lb)   BMI 30.92 kg/m      GENERAL APPEARANCE: healthy, alert and no distress   GAIT: NORMAL  SKIN: no suspicious lesions or rashes  NEURO: Normal strength and tone, mentation intact and speech normal  PSYCH:  mentation appears normal and affect normal/bright  HEENT: no scleral icterus  CV: Distal perfusion intact.   RESP: nonlabored breathing       Left Shoulder exam  Still lacking end range overhead motion, with pain    Skin:       no visible deformities       well perfused       capillary refill brisk     Sensation:        normal sensation over shoulder and upper extremity       Radiology  Visualized MRI of the left shoulder, and reviewed images and report with patient.  MRI demonstrates question fluid signal superior labrum, could indicate tearing; see series 7, images 8-9. Otherwise, no acute abnormality.    Results for orders placed or performed in visit on 10/02/19   MR Shoulder Left w/o Contrast    Narrative    MRI LEFT SHOULDER WITHOUT CONTRAST  10/2/2019 7:46 " AM    HISTORY: Shoulder pain and limited range of motion since June 2019  following an injury.    COMPARISON: Radiographs on 9/25/2019.    TECHNIQUE: Multiplanar MR imaging was performed without contrast.    FINDINGS:  Osseous acromion outlet: There is a type II configuration of the  acromion with no significant lateral or anterior downsloping. There  are no degenerative changes of the acromioclavicular joint. The outlet  is widely patent. No os acromiale.    Rotator cuff: The supraspinatus, infraspinatus, subscapularis, and  teres minor tendons and visualized muscles are normal.     Labrum: No tear or other labral pathology is demonstrated.    Biceps tendon: The biceps tendon is in the bicipital groove. It is  intact and demonstrates normal signal.    Osseous structures and cartilaginous surfaces: No fracture or osseous  lesion is seen. No abnormal marrow signal intensity is identified. The  cartilage surfaces are well preserved.    Additional findings: No joint effusion is demonstrated. There is no  fluid within the subacromial-subdeltoid bursa. The adjacent soft  tissues are unremarkable.      Impression    IMPRESSION: Unremarkable MRI of the left shoulder.    FARRAH KATZ MD     =======================================================    SHOULDER LEFT THREE OR MORE VIEWS    9/25/2019 9:18 AM      HISTORY: Left shoulder pain, unspecified chronicity.     COMPARISON: None.                                                                      IMPRESSION: Acromioclavicular joint and glenohumeral joints are  unremarkable. No fracture or subluxation.     SPENSER GENAO MD      Assessment:  1. Left shoulder pain, unspecified chronicity        Plan:  Discussed the assessment with the patient.  Radiologic images reviewed and discussed with patient today. Question labrum pathology, though not indicated in radiology report. Will focus on strain, treat as impingement/cuff syndrome to start. If persistent issues, may  [place] back to possible labrum pathology.  Subacromial steroid injection done today; plan to proceed with PT.    Steroid injection of the left shoulder: subacromial space was performed today in clinic.   Icing for the next 1-2 days may be helpful for pain. Injection may take 10-14 days to see the full effect.  Rehab: Patient has already scheduled a physical therapy appointment for next week.  He will continue that for one month.  If symptoms not improving, he will call me and we will discuss a change of course.  Imaging: Discussed a possible MRI with contrast.  Described the difference between an MRI and MRI with contrast.  The purpose of an MRI with contrast would be to confirm that there is no tear.  Patient will not pursue this option today.  Follow up: one month, sooner if needed  Future consideration: imaging with contrast  Questions answered.  Patient demonstrated understanding of these issues and agrees with the plan.     Elijah Ospina DO, CAQ        Large Joint Injection/Arthocentesis: L subacromial bursa  Date/Time: 10/8/2019 9:25 AM  Performed by: Elijah Ospina DO  Authorized by: Elijah Ospina DO     Indications:  Pain  Needle Size:  25 G  Guidance: landmark guided    Approach:  Posterolateral  Location:  Shoulder      Site:  L subacromial bursa  Medications:  2 mL lidocaine 1 %; 40 mg triamcinolone 40 MG/ML  Outcome:  Tolerated well, no immediate complications  Procedure discussed: discussed risks, benefits, and alternatives    Consent Given by:  Patient  Timeout: timeout called immediately prior to procedure    Prep: patient was prepped and draped in usual sterile fashion                      Disclaimer: This note consists of symbols derived from keyboarding, dictation and/or voice recognition software. As a result, there may be errors in the script that have gone undetected. Please consider this when interpreting information found in this chart.    The information in this  document, created by a scribe for me, accurately reflects the services I personally performed and the decisions made by me. I have reviewed and approved this document for accuracy.

## 2019-10-08 NOTE — LETTER
"    10/8/2019         RE: Tio Merino  [address] [facility] [address]        Dear Colleague,    Thank you for referring your patient, Tio Merino, to the [facility] [place]. Please see a copy of my visit note below.    Sports Medicine Clinic Visit    PCP: Danyell Banuelos    Tio Merino is a 34 year old male who is seen in f/u up for Left shoulder pain, unspecified chronicity. Since last visit on 9/25/2019 patient has undergone an MRI.  Here today to review MRI.  He would like to start PT next week and discuss an injection today.      **        Review of Systems  All other systems reviewed and are negative unless noted above.    Past Medical History:   Diagnosis Date     Asthma      Diabetes (H) 3/20/2014     Hypertension 4/12/2017     No significant past surgical history.    This document serves as a record of the services and decisions personally performed and made by DO AKBAR Shaw. It was created on his behalf by Gabriel Licona, a trained medical scribe. The creation of this document is based the provider's statements to the medical scribe.    Scribguillermina Licona 9:50 AM 10/8/2019     Objective  /78   Ht 1.784 m (5' 10.25\")   Wt 98.4 kg (217 lb)   BMI 30.92 kg/m       GENERAL APPEARANCE: healthy, alert and no distress   GAIT: NORMAL  SKIN: no suspicious lesions or rashes  NEURO: Normal strength and tone, mentation intact and speech normal  PSYCH:  mentation appears normal and affect normal/bright  HEENT: no scleral icterus  CV: Distal perfusion intact.   RESP: nonlabored breathing       Left Shoulder exam  Still lacking end range overhead motion, with pain    Skin:       no visible deformities       well perfused       capillary refill brisk     Sensation:        normal sensation over shoulder and upper extremity       Radiology  Visualized MRI of the left shoulder, and reviewed images and report with patient.  MRI demonstrates question fluid signal " superior labrum, could indicate tearing; see series 7, images 8-9. Otherwise, no acute abnormality.    Results for orders placed or performed in visit on 10/02/19   MR Shoulder Left w/o Contrast    Narrative    MRI LEFT SHOULDER WITHOUT CONTRAST  10/2/2019 7:46 AM    HISTORY: Shoulder pain and limited range of motion since June 2019  following an injury.    COMPARISON: Radiographs on 9/25/2019.    TECHNIQUE: Multiplanar MR imaging was performed without contrast.    FINDINGS:  Osseous acromion outlet: There is a type II configuration of the  acromion with no significant lateral or anterior downsloping. There  are no degenerative changes of the acromioclavicular joint. The outlet  is widely patent. No os acromiale.    Rotator cuff: The supraspinatus, infraspinatus, subscapularis, and  teres minor tendons and visualized muscles are normal.     Labrum: No tear or other labral pathology is demonstrated.    Biceps tendon: The biceps tendon is in the bicipital groove. It is  intact and demonstrates normal signal.    Osseous structures and cartilaginous surfaces: No fracture or osseous  lesion is seen. No abnormal marrow signal intensity is identified. The  cartilage surfaces are well preserved.    Additional findings: No joint effusion is demonstrated. There is no  fluid within the subacromial-subdeltoid bursa. The adjacent soft  tissues are unremarkable.      Impression    IMPRESSION: Unremarkable MRI of the left shoulder.    FARRAH KAZT MD     =======================================================    SHOULDER LEFT THREE OR MORE VIEWS    9/25/2019 9:18 AM      HISTORY: Left shoulder pain, unspecified chronicity.     COMPARISON: None.                                                                      IMPRESSION: Acromioclavicular joint and glenohumeral joints are  unremarkable. No fracture or subluxation.     SPENSER GENAO MD      Assessment:  1. Left shoulder pain, unspecified chronicity        Plan:  Discussed  the assessment with the patient.  Radiologic images reviewed and discussed with patient today. Question labrum pathology, though not indicated in radiology report. Will focus on strain, treat as impingement/cuff syndrome to start. If persistent issues, may [place] [employer] back to possible labrum pathology.  Subacromial steroid injection done today; plan to proceed with PT.    Steroid injection of the left shoulder: subacromial space was performed today in clinic.   Icing for the next 1-2 days may be helpful for pain. Injection may take 10-14 days to see the full effect.  Rehab: Patient has already scheduled a physical therapy appointment for next week.  He will continue that for one month.  If symptoms not improving, he will call me and we will discuss a change of course.  Imaging: Discussed a possible MRI with contrast.  Described the difference between an MRI and MRI with contrast.  The purpose of an MRI with contrast would be to confirm that there is no tear.  Patient will not pursue this option today.  Follow up: one month, sooner if needed  Future consideration: imaging with contrast  Questions answered.  Patient demonstrated understanding of these issues and agrees with the plan.     Elijah Ospina DO, AKBAR        Large Joint Injection/Arthocentesis: L subacromial bursa  Date/Time: 10/8/2019 9:25 AM  Performed by: Elijah Ospina DO  Authorized by: Elijah Ospina DO     Indications:  Pain  Needle Size:  25 G  Guidance: landmark guided    Approach:  Posterolateral  Location:  Shoulder      Site:  L subacromial bursa  Medications:  2 mL lidocaine 1 %; 40 mg triamcinolone 40 MG/ML  Outcome:  Tolerated well, no immediate complications  Procedure discussed: discussed risks, benefits, and alternatives    Consent Given by:  Patient  Timeout: timeout called immediately prior to procedure    Prep: patient was prepped and draped in usual sterile fashion                      Disclaimer: This note consists of  symbols derived from keyboarding, dictation and/or voice recognition software. As a result, there may be errors in the script that have gone undetected. Please consider this when interpreting information found in this chart.    The information in this document, created by a scribe for me, accurately reflects the services I personally performed and the decisions made by me. I have reviewed and approved this document for accuracy.          Again, thank you for allowing me to participate in the care of your patient.        Sincerely,        Elijah Ospina, DO

## 2019-10-08 NOTE — PATIENT INSTRUCTIONS
We reviewed the MRI; unremarkable per the radiology report, but I question whether there may be something abnormal with the labrum cartilage  Left shoulder subacromial steroid injection done today  Proceed with physical therapy   Monitor course next 1 month with PT

## 2019-10-22 NOTE — PROGRESS NOTES
Subjective     Tio Merino is a 34 year old male who presents to clinic today for the following health issues:    HPI   Diabetes Follow-up      How often are you checking your blood sugar? Two times daily    What time of day are you checking your blood sugars (select all that apply)?  Before meals and After meals    Have you had any blood sugars above 200?  No    Have you had any blood sugars below 70?  No    What symptoms do you notice when your blood sugar is low?  None    What concerns do you have today about your diabetes? None     Do you have any of these symptoms? (Select all that apply)  No numbness or tingling in feet.  No redness, sores or blisters on feet.  No complaints of excessive thirst.  No reports of blurry vision.  No significant changes to weight.   Have you had a diabetic eye exam in the last 12 months? Yes- Date of last eye exam: 5/19     Has trouble remembering to take his medication multiple times per day, daily.  Wondering about changing to once weekly trulicity. Advised we can see if covered by insurance, set alarm on phone to remember once weekly dosing.   Diabetes Management Resources    Hyperlipidemia Follow-Up      Are you having any of the following symptoms? (Select all that apply)  No complaints of shortness of breath, chest pain or pressure.  No increased sweating or nausea with activity.  No left-sided neck or arm pain.  No complaints of pain in calves when walking 1-2 blocks.    Are you regularly taking any medication or supplement to lower your cholesterol?   Yes- lipitor    Are you having muscle aches or other side effects that you think could be caused by your cholesterol lowering medication?  Yes- left calf cramping    Hypertension Follow-up      Do you check your blood pressure regularly outside of the clinic? No     Are you following a low salt diet? Yes    Are your blood pressures ever more than 140 on the top number (systolic) OR more   than 90 on the bottom number  (diastolic), for example 140/90? na    BP Readings from Last 2 Encounters:   10/24/19 136/84   10/08/19 118/78     Hemoglobin A1C (%)   Date Value   07/25/2019 8.7 (H)   06/01/2018 8.4 (H)     LDL Cholesterol Calculated (mg/dL)   Date Value   07/25/2019 96   06/01/2018 105 (H)         How many servings of fruits and vegetables do you eat daily?  0-1    On average, how many sweetened beverages do you drink each day (soda, juice, sweet tea, etc)?   1  How many days per week do you miss taking your medication? 1    What makes it hard for you to take your medications?  remembering to take    Patient Active Problem List   Diagnosis     Moderate persistent asthma     Type 2 diabetes mellitus (H)     Hyperlipidemia LDL goal <100     Benign essential hypertension     History reviewed. No pertinent surgical history.    Social History     Tobacco Use     Smoking status: Never Smoker     Smokeless tobacco: Never Used   Substance Use Topics     Alcohol use: Yes     Comment: once a week tops     Family History   Problem Relation Age of Onset     Diabetes Maternal Grandfather      Hypertension Maternal Grandfather      Diabetes Other         multiple people on mother's side     Hypertension Mother      Hypertension Maternal Grandmother      Diabetes Other      Asthma Paternal Grandfather      Colon Cancer No family hx of      Prostate Cancer No family hx of          Current Outpatient Medications   Medication Sig Dispense Refill     albuterol (PROAIR HFA/PROVENTIL HFA/VENTOLIN HFA) 108 (90 Base) MCG/ACT inhaler Inhale 2 puffs into the lungs every 6 hours 8.5 g 11     ASPIRIN NOT PRESCRIBED (INTENTIONAL) Please choose reason not prescribed, below 0 each 0     atorvastatin (LIPITOR) 10 MG tablet Take 1 tablet (10 mg) by mouth daily 90 tablet 1     dulaglutide (TRULICITY) 0.75 MG/0.5ML pen Inject 0.75 mg Subcutaneous every 7 days 13.5 mL 0     fluticasone-salmeterol (ADVAIR DISKUS) 100-50 MCG/DOSE inhaler Inhale 1 puff into the  lungs every 12 hours 3 Inhaler 1     hydrochlorothiazide (HYDRODIURIL) 25 MG tablet Take 1 tablet (25 mg) by mouth daily 90 tablet 1     Allergies   Allergen Reactions     Lisinopril Cough     Recent Labs   Lab Test 07/25/19  0847 06/01/18  0825  09/11/17  1743   A1C 8.7* 8.4*  --  8.0*   LDL 96 105*  --  153*   HDL 35* 31*  --  34*   TRIG 260* 169*  --  377*   ALT  --   --   --  99*   CR 0.74 0.74   < > 0.71   GFRESTIMATED >90 >90   < > >90   GFRESTBLACK >90 >90   < > >90   POTASSIUM 3.9 3.8   < > 4.0   TSH  --  1.62  --  2.17    < > = values in this interval not displayed.      BP Readings from Last 3 Encounters:   10/24/19 136/84   10/08/19 118/78   09/25/19 124/84    Wt Readings from Last 3 Encounters:   10/24/19 98.7 kg (217 lb 9.6 oz)   10/08/19 98.4 kg (217 lb)   09/25/19 98.4 kg (217 lb)                    Reviewed and updated as needed this visit by Provider  Tobacco  Allergies  Meds  Problems  Med Hx  Surg Hx  Fam Hx         Review of Systems   ROS COMP: Constitutional, HEENT, cardiovascular, pulmonary, GI, , musculoskeletal, neuro, skin, endocrine and psych systems are negative, except as otherwise noted.      Objective    /84   Pulse 88   Temp 98  F (36.7  C) (Oral)   Resp 16   Wt 98.7 kg (217 lb 9.6 oz)   SpO2 97%   BMI 31.00 kg/m    Body mass index is 31 kg/m .  Physical Exam   GENERAL: healthy, alert and no distress  EYES: Eyes grossly normal to inspection, PERRL and conjunctivae and sclerae normal  NECK: no adenopathy, no asymmetry, masses, or scars and thyroid normal to palpation  RESP: lungs clear to auscultation - no rales, rhonchi or wheezes  CV: regular rate and rhythm, normal S1 S2, no S3 or S4, no murmur, click or rub, no peripheral edema and peripheral pulses strong  MS: no gross musculoskeletal defects noted, no edema, no CVA tenderness  PSYCH: mentation appears normal, affect normal/bright  Diabetic foot exam: normal DP and PT pulses, no trophic changes or ulcerative  "lesions and normal sensory exam    Diagnostic Test Results:  Labs reviewed in Epic  See orders        Assessment & Plan       ICD-10-CM    1. Type 2 diabetes mellitus without complication, without long-term current use of insulin (H) E11.9 HEMOGLOBIN A1C     FOOT EXAM     dulaglutide (TRULICITY) 0.75 MG/0.5ML pen     **A1C FUTURE 3mo     CANCELED: OPHTHALMOLOGY ADULT REFERRAL   2. Moderate persistent asthma without complication J45.40 fluticasone-salmeterol (ADVAIR DISKUS) 100-50 MCG/DOSE inhaler   3. Hyperlipidemia LDL goal <100 E78.5 Lipid panel reflex to direct LDL Fasting   4. Benign essential hypertension I10         BMI:   Estimated body mass index is 31 kg/m  as calculated from the following:    Height as of 10/8/19: 1.784 m (5' 10.25\").    Weight as of this encounter: 98.7 kg (217 lb 9.6 oz).   Weight management plan: Discussed healthy diet and exercise guidelines        See Patient Instructions: If covered by insurance, lab recheck in 3 months to see medication effectiveness. Follow up if needed for questions/ concerns.     Return in about 6 months (around 4/24/2020), or if symptoms worsen or fail to improve.    Danyell Banuelos, LIBBY  [facility]    "

## 2019-10-22 NOTE — PATIENT INSTRUCTIONS
Reminders: If you are signed up for [employer] please be aware your results and communications will be sent within your mychart. Please remember to arrive 5-10 minutes early for your appointments. If you are late you may need to reschedule your appointment.      If covered by insurance, lab recheck in 3 months to see medication effectiveness. Follow up if needed for questions/ concerns.

## 2019-10-24 ENCOUNTER — TELEPHONE (OUTPATIENT)
Dept: FAMILY MEDICINE | Facility: CLINIC | Age: 34
End: 2019-10-24

## 2019-10-24 ENCOUNTER — OFFICE VISIT (OUTPATIENT)
Dept: FAMILY MEDICINE | Facility: CLINIC | Age: 34
End: 2019-10-24
Payer: COMMERCIAL

## 2019-10-24 VITALS
HEART RATE: 88 BPM | TEMPERATURE: 98 F | WEIGHT: 217.6 LBS | SYSTOLIC BLOOD PRESSURE: 136 MMHG | OXYGEN SATURATION: 97 % | DIASTOLIC BLOOD PRESSURE: 84 MMHG | BODY MASS INDEX: 31 KG/M2 | RESPIRATION RATE: 16 BRPM

## 2019-10-24 DIAGNOSIS — E78.5 HYPERLIPIDEMIA LDL GOAL <100: ICD-10-CM

## 2019-10-24 DIAGNOSIS — E11.9 TYPE 2 DIABETES MELLITUS WITHOUT COMPLICATION, WITHOUT LONG-TERM CURRENT USE OF INSULIN (H): ICD-10-CM

## 2019-10-24 DIAGNOSIS — I10 BENIGN ESSENTIAL HYPERTENSION: ICD-10-CM

## 2019-10-24 DIAGNOSIS — J45.40 MODERATE PERSISTENT ASTHMA WITHOUT COMPLICATION: ICD-10-CM

## 2019-10-24 DIAGNOSIS — E11.9 TYPE 2 DIABETES MELLITUS WITHOUT COMPLICATION, WITHOUT LONG-TERM CURRENT USE OF INSULIN (H): Primary | ICD-10-CM

## 2019-10-24 LAB
CHOLEST SERPL-MCNC: 191 MG/DL
HBA1C MFR BLD: 8.8 % (ref 0–5.6)
HDLC SERPL-MCNC: 41 MG/DL
LDLC SERPL CALC-MCNC: 120 MG/DL
NONHDLC SERPL-MCNC: 150 MG/DL
TRIGL SERPL-MCNC: 149 MG/DL

## 2019-10-24 PROCEDURE — 36415 COLL VENOUS BLD VENIPUNCTURE: CPT | Performed by: NURSE PRACTITIONER

## 2019-10-24 PROCEDURE — 83036 HEMOGLOBIN GLYCOSYLATED A1C: CPT | Performed by: NURSE PRACTITIONER

## 2019-10-24 PROCEDURE — 99207 C FOOT EXAM  NO CHARGE: CPT | Performed by: NURSE PRACTITIONER

## 2019-10-24 PROCEDURE — 99215 OFFICE O/P EST HI 40 MIN: CPT | Mod: 25 | Performed by: NURSE PRACTITIONER

## 2019-10-24 PROCEDURE — 80061 LIPID PANEL: CPT | Performed by: NURSE PRACTITIONER

## 2019-10-24 PROCEDURE — 90686 IIV4 VACC NO PRSV 0.5 ML IM: CPT | Performed by: NURSE PRACTITIONER

## 2019-10-24 PROCEDURE — 90471 IMMUNIZATION ADMIN: CPT | Performed by: NURSE PRACTITIONER

## 2019-10-24 NOTE — TELEPHONE ENCOUNTER
Janet states she would like clarification on the quantity of Trulicity for patient as it is written for about 7 boxes and one box usually lasts one month.  Please call.    Thank you.

## 2019-10-24 NOTE — TELEPHONE ENCOUNTER
Per pharmacist Wesley, Dulaglutide (Trulicity) 0.75 mg/0.5 ml pen updated to dispense 6 ml with 1 additional refill.     This will allow patient a 12 week supply with 1 additional refill ( a total of 24 weeks).     Patient is due to return for a diabetic follow up in 6 months (on or around 4/24/20).     Francheska Khalil RN BSN

## 2019-10-25 ASSESSMENT — ASTHMA QUESTIONNAIRES: ACT_TOTALSCORE: 22

## 2019-10-28 NOTE — RESULT ENCOUNTER NOTE
Rafy Kinsey,    Thank you for your recent office visit.    Here are your recent results.  Your A1c is creeping up, continue to work on diet, exercise and weight loss. Follow up in clinic as needed.     Feel free to contact me via [employer] or call the clinic at [phone].    Sincerely,    MARLY Dooley, FNP-BC

## 2019-10-29 ENCOUNTER — THERAPY VISIT (OUTPATIENT)
Dept: PHYSICAL THERAPY | Facility: CLINIC | Age: 34
End: 2019-10-29
Attending: PEDIATRICS
Payer: COMMERCIAL

## 2019-10-29 DIAGNOSIS — M25.512 LEFT SHOULDER PAIN, UNSPECIFIED CHRONICITY: ICD-10-CM

## 2019-10-29 DIAGNOSIS — M25.512 ACUTE PAIN OF LEFT SHOULDER: ICD-10-CM

## 2019-10-29 PROCEDURE — 97110 THERAPEUTIC EXERCISES: CPT | Mod: GP | Performed by: PHYSICAL THERAPIST

## 2019-10-29 PROCEDURE — 97161 PT EVAL LOW COMPLEX 20 MIN: CPT | Mod: GP | Performed by: PHYSICAL THERAPIST

## 2019-10-29 NOTE — PROGRESS NOTES
[facility] Initial Evaluation  Subjective:  The history is provided by the patient. No  was used.   Tio Merino being seen for left shoulder.   Problem began 6/8/2019. Where condition occurred: during recreation / sport.Problem occurred: throwing the ball  and reported as 3/10 (at worst 8/10) on pain scale. General health as reported by patient is good. Pertinent medical history includes:  Asthma and diabetes.  Medical allergies: none.  Surgeries include:  None.  Current medications:  High blood pressure medication.   Primary job tasks include:  Computer work.  Pain is described as sharp and shooting and is intermittent. Pain is the same all the time. Since onset symptoms are gradually worsening. Special tests:  MRI and x-ray (wnl-may have another MRI with contrast).  There was none improvement following previous treatment.   Occupation: customer service. Restrictions include:  Working in normal job without restrictions.    Barriers include:  None as reported by patient.  Red flags:  None as reported by patient.  Type of problem:  Left shoulder   Condition occurred with:  Repetition/overuse.    Problem details: Date of MD order for this episode was 10-3-19. Tio injured his L shoulder throwing a softball June or July this past summer. He heard a pop in the shoulder. An xray and MRI were wnl.  He had an injection 10-8-19. The injection was helpful with the general ache in the shoulder. Gorge states he is still having difficulty with reaching, any throwing, his ROM. The shoulder does not keep him awake at night since the injection.  Plays softball almost year round. He has not played much since the injection as he had sharp pain with throwing  Gorge is L handed.   Patient reports pain:  Lateral. Radiates to:  Elbow and upper arm. Associated symptoms:  Loss of motion/stiffness, loss of strength and other (burning sensation). Symptoms are exacerbated by lifting, using arm  at shoulder level, certain positions, using arm overhead, using arm behind back and lying on extremity and relieved by NSAID's and other (KT tape).                      Objective:  Standing Alignment:    Cervical/Thoracic:  Forward head and thoracic kyphosis decreased    Lumbar:  Lordosis decr          General deviations alignment: elevated traps.                           Shoulder Evaluation:  ROM:  AROM:  : standing L shoulder flx 135, abd 125-hikes shoulder for motion.  Flexion:  Left:  155    Right:  Wnl    Abduction:  Left: 90   Right:  Wnl    Internal Rotation:  Left:  60    Right:  Wnl  External Rotation:  Left:  63    Right:  Wnl                PROM:    Flexion:  Left:  157          Abduction:  Left:  95        Internal Rotation:  Left:  63      External Rotation:  Left:  65                        Strength:    Flexion: Left:/5 weak/painful   Pain:    Right: 4/5     Pain:   Extension:  Left: 4-/5    Pain:    Right: 5/5    Pain:  Abduction:  Left:/5 Weak/painful  Pain:    Right: 5/5     Pain:  Adduction:  Left: 4/5    Pain:    Right: 5/5     Pain:  Internal Rotation:  Left:3/5      Pain:++    Right: 5/5     Pain:  External Rotation:   Left:3/5     Pain:   Right:5/5     Pain:        Elbow Flexion:  Left:5/5     Pain:    Right:5/5     Pain:  Elbow Extension:  Left:5/5     Pain:    Right:5/5     Pain:    Special Tests:    Left shoulder positive for the following special tests:  Impingement    Palpation:    Left shoulder tenderness present at:  Deltoid  Left shoulder tenderness not present at: Clavicle; Sternoclavicular; Acrimioclavicular; Biceps; Triceps; Supraspinatus; Infraspinatus; Teres Minor; Subscapularis; Levator; Rhomboids or Upper Trap    Mobility Tests:        Glenohumeral inferior left:  Hypomobile                                             General     ROS    Assessment/Plan:    Patient is a 34 year old male with left side shoulder complaints.    Patient has the following significant findings with  corresponding treatment plan.                Diagnosis 1:  L shoulder pain  Pain -  manual therapy, self management, education and home program  Decreased ROM/flexibility - manual therapy, therapeutic exercise and home program  Decreased joint mobility - manual therapy, therapeutic exercise and home program  Decreased strength - therapeutic exercise, therapeutic activities and home program  Impaired muscle performance - neuro re-education and home program  Decreased function - therapeutic activities and home program  Impaired posture - neuro re-education and home program    Therapy Evaluation Codes:   1) History comprised of:   Personal factors that impact the plan of care:      Time since onset of symptoms.    Comorbidity factors that impact the plan of care are:      Diabetes.     Medications impacting care: None.  2) Examination of Body Systems comprised of:   Body structures and functions that impact the plan of care:      Shoulder.   Activity limitations that impact the plan of care are:      Bathing, Cooking, Dressing, Lifting, Sports, Throwing and reaching.  3) Clinical presentation characteristics are:   Stable/Uncomplicated.  4) Decision-Making    Low complexity using standardized patient assessment instrument and/or measureable assessment of functional outcome.  Cumulative Therapy Evaluation is: Low complexity.    Previous and current functional limitations:  (See Goal Flow Sheet for this information)    Short term and Long term goals: (See Goal Flow Sheet for this information)     Communication ability:  Patient appears to be able to clearly communicate and understand verbal and written communication and follow directions correctly.  Treatment Explanation - The following has been discussed with the patient:   RX ordered/plan of care  Anticipated outcomes  Possible risks and side effects  This patient would benefit from PT intervention to resume normal activities.   Rehab potential is good.   Tio with  f/u with [facility] for throwing program/shoulder rehab    Frequency:  1 X week, once daily  Duration:  for 6 weeks, tapering to 2x/month for one month  Discharge Plan:  Achieve all LTG.  Independent in home treatment program.  Reach maximal therapeutic benefit.    Please refer to the daily flowsheet for treatment today, total treatment time and time spent performing 1:1 timed codes.

## 2019-11-05 ENCOUNTER — THERAPY VISIT (OUTPATIENT)
Dept: PHYSICAL THERAPY | Facility: CLINIC | Age: 34
End: 2019-11-05
Payer: COMMERCIAL

## 2019-11-05 DIAGNOSIS — M25.512 ACUTE PAIN OF LEFT SHOULDER: ICD-10-CM

## 2019-11-05 PROCEDURE — 97110 THERAPEUTIC EXERCISES: CPT | Mod: GP | Performed by: PHYSICAL THERAPIST

## 2019-11-05 PROCEDURE — 97140 MANUAL THERAPY 1/> REGIONS: CPT | Mod: GP | Performed by: PHYSICAL THERAPIST

## 2019-11-12 ENCOUNTER — THERAPY VISIT (OUTPATIENT)
Dept: PHYSICAL THERAPY | Facility: CLINIC | Age: 34
End: 2019-11-12
Payer: COMMERCIAL

## 2019-11-12 DIAGNOSIS — M25.512 ACUTE PAIN OF LEFT SHOULDER: ICD-10-CM

## 2019-11-12 PROCEDURE — 97110 THERAPEUTIC EXERCISES: CPT | Mod: GP | Performed by: PHYSICAL THERAPIST

## 2019-11-12 PROCEDURE — 97140 MANUAL THERAPY 1/> REGIONS: CPT | Mod: GP | Performed by: PHYSICAL THERAPIST

## 2019-11-12 NOTE — PROGRESS NOTES
SUBJECTIVE  Subjective: Shoulder was pretty sore after last visit for about 2 days.  Pretty much back to baseline now   Current Pain level: 3/10   Changes in function:  None    Adverse reaction to treatment or activity:  None    OBJECTIVE  Objective: Initially could actively ABD L shoulder to 115-120 with report of feeling more stuck than painful.  After mobs- specifically post glides it improved to 140     ASSESSMENT  Tio continues to require intervention to meet STG and LTG's: PT  Patient is progressing as expected.  Response to therapy has shown an improvement in  ROM   Progress made towards STG/LTG?  No goals met to date    PLAN  Current treatment program is being advanced to more complex exercises.    PTA/ATC plan:  N/A    Please refer to the daily flowsheet for treatment today, total treatment time and time spent performing 1:1 timed codes.

## 2019-11-19 ENCOUNTER — THERAPY VISIT (OUTPATIENT)
Dept: PHYSICAL THERAPY | Facility: CLINIC | Age: 34
End: 2019-11-19
Payer: COMMERCIAL

## 2019-11-19 DIAGNOSIS — M25.512 ACUTE PAIN OF LEFT SHOULDER: ICD-10-CM

## 2019-11-19 PROCEDURE — 97112 NEUROMUSCULAR REEDUCATION: CPT | Mod: GP | Performed by: PHYSICAL THERAPIST

## 2019-11-19 PROCEDURE — 97110 THERAPEUTIC EXERCISES: CPT | Mod: GP | Performed by: PHYSICAL THERAPIST

## 2019-11-19 PROCEDURE — 97140 MANUAL THERAPY 1/> REGIONS: CPT | Mod: GP | Performed by: PHYSICAL THERAPIST

## 2019-11-25 ENCOUNTER — TELEPHONE (OUTPATIENT)
Dept: FAMILY MEDICINE | Facility: CLINIC | Age: 34
End: 2019-11-25

## 2019-11-25 NOTE — TELEPHONE ENCOUNTER
Panel Management Review  Summary:    Patient is due/failing the following:   Failing A1C  Health Maintenance Due   Topic Date Due     PREVENTIVE CARE VISIT  1985        Type of outreach:    patient seen on 10/24/19-repeat a1c in January                                                                                                                   [facility]    Chart routed to Care Team .

## 2019-12-26 ENCOUNTER — TELEPHONE (OUTPATIENT)
Dept: ORTHOPEDICS | Facility: CLINIC | Age: 34
End: 2019-12-26

## 2019-12-26 DIAGNOSIS — M25.512 LEFT SHOULDER PAIN, UNSPECIFIED CHRONICITY: Primary | ICD-10-CM

## 2019-12-26 NOTE — TELEPHONE ENCOUNTER
Patient called today.    Patient is requesting Dr. Juares at [facility] to give a referral for MRI on left shoulder.    Please contact patient.    Thank you.    Central Scheduling  Chelsie EDWARDS

## 2019-12-31 NOTE — TELEPHONE ENCOUNTER
LVM for patient to call back to discuss.    Asked patient to let us know if he would like a steroid injection while having the contrast injection for the MRI>    Eunice Guzman MS ATC

## 2019-12-31 NOTE — TELEPHONE ENCOUNTER
Spoke with patient. He would like to proceed with MRI with contrast but does not want the steroid at the same time. He is Diabetic and the steroid increases his Blood Sugar. Order for left shoulder MRI with VISHNU is pending. Please approve  Beni Black ATC

## 2019-12-31 NOTE — TELEPHONE ENCOUNTER
LVM with patient to discuss. Please refer to previous message. Requested patient return call and LVM with 2-3 times that would work best to reach him.  Beni Black, ATC

## 2019-12-31 NOTE — TELEPHONE ENCOUNTER
We had discussed MRI with contrast previously. See note from 10/8/19.    If ongoing symptoms despite subacromial steroid injection and therapy, options are 1) MRI with contrast (can order, and may request steroid injection into glenohumeral joint as same time as contrast, if he is interested in that); 2) return to clinic to reassess and discuss further.  Ok for imaging if he desires that next. If wanting to add steroid to the contrast injection, can do that as well. Otherwise, would get MRI with contrast and consider next steps pending results. For example, if laburm tear, could return to PT, try intra-articular shoulder steroid injection, or possibly refer to see ortho surgeon.    Thanks.    Elijah Ospina, , CAQ

## 2020-01-10 ENCOUNTER — HOSPITAL ENCOUNTER (OUTPATIENT)
Dept: GENERAL RADIOLOGY | Facility: CLINIC | Age: 35
Discharge: HOME | End: 2020-01-10
Attending: PEDIATRICS | Admitting: PEDIATRICS
Payer: COMMERCIAL

## 2020-01-10 ENCOUNTER — HOSPITAL ENCOUNTER (OUTPATIENT)
Dept: MRI IMAGING | Facility: CLINIC | Age: 35
End: 2020-01-10
Attending: PEDIATRICS
Payer: COMMERCIAL

## 2020-01-10 DIAGNOSIS — M25.512 LEFT SHOULDER PAIN, UNSPECIFIED CHRONICITY: ICD-10-CM

## 2020-01-10 PROCEDURE — 73222 MRI JOINT UPR EXTREM W/DYE: CPT | Mod: LT

## 2020-01-10 PROCEDURE — 25500064 ZZH RX 255 OP 636: Performed by: RADIOLOGY

## 2020-01-10 PROCEDURE — 23350 INJECTION FOR SHOULDER X-RAY: CPT

## 2020-01-10 PROCEDURE — 25000125 ZZHC RX 250: Performed by: RADIOLOGY

## 2020-01-10 PROCEDURE — 25000128 H RX IP 250 OP 636: Performed by: RADIOLOGY

## 2020-01-10 PROCEDURE — A9585 GADOBUTROL INJECTION: HCPCS | Performed by: RADIOLOGY

## 2020-01-10 RX ORDER — LIDOCAINE HYDROCHLORIDE 10 MG/ML
5 INJECTION, SOLUTION EPIDURAL; INFILTRATION; INTRACAUDAL; PERINEURAL ONCE
Status: COMPLETED | OUTPATIENT
Start: 2020-01-10 | End: 2020-01-10

## 2020-01-10 RX ORDER — GADOBUTROL 604.72 MG/ML
0.1 INJECTION INTRAVENOUS ONCE
Status: COMPLETED | OUTPATIENT
Start: 2020-01-10 | End: 2020-01-10

## 2020-01-10 RX ORDER — IOPAMIDOL 408 MG/ML
10 INJECTION, SOLUTION INTRATHECAL ONCE
Status: COMPLETED | OUTPATIENT
Start: 2020-01-10 | End: 2020-01-10

## 2020-01-10 RX ORDER — EPINEPHRINE 1 MG/ML
0.1 INJECTION, SOLUTION, CONCENTRATE INTRAVENOUS ONCE
Status: COMPLETED | OUTPATIENT
Start: 2020-01-10 | End: 2020-01-10

## 2020-01-10 RX ADMIN — EPINEPHRINE 0.05 ML: 1 INJECTION, SOLUTION, CONCENTRATE INTRAVENOUS at 08:54

## 2020-01-10 RX ADMIN — LIDOCAINE HYDROCHLORIDE 5 ML: 10 INJECTION, SOLUTION EPIDURAL; INFILTRATION; INTRACAUDAL; PERINEURAL at 08:54

## 2020-01-10 RX ADMIN — IOPAMIDOL 1 ML: 408 INJECTION, SOLUTION INTRATHECAL at 08:54

## 2020-01-10 RX ADMIN — GADOBUTROL 0.05 ML: 604.72 INJECTION INTRAVENOUS at 08:53

## 2020-01-10 NOTE — PROGRESS NOTES
RADIOLOGY PROCEDURE NOTE  Patient name: Tio Merino  MRN: [id number]  : 1985    Pre-procedure diagnosis: Pain.  Post-procedure diagnosis: Same    Procedure Date/Time: January 10, 2020  8:50 AM  Procedure: Left shoulder intraarticular VISHNU injection for MRI to follow.  Estimated blood loss: None  Specimen(s) collected with description:  None.  The patient tolerated the procedure well with no immediate complications.    See imaging dictation for procedural details.    Provider name: George Damon MD  Assistant(s):None

## 2020-01-13 ENCOUNTER — TELEPHONE (OUTPATIENT)
Dept: ORTHOPEDICS | Facility: CLINIC | Age: 35
End: 2020-01-13

## 2020-01-13 NOTE — TELEPHONE ENCOUNTER
EXAM: MR left shoulder with contrast 1/10/2020 11:46 AM     TECHNIQUE: Multiplanar, multisequence imaging of the left shoulder  were obtained with the administration of intra-articular gadolinium  contrast using routine protocol.     History: Shoulder pain, acute, persistent, xray and exam nonspecific;  Left shoulder pain, unspecified chronicity     Additional Clinical information from EMR: Throwing injury     Comparison: MRI of the left shoulder 10/2/2019     Findings:     ROTATOR CUFF and ASSOCIATED STRUCTURES  Rotator cuff: Low grade partial-thickness bursal sided tear of the  supraspinatus tendon at the footprint with medial delamination tear  extension. The subscapularis, infraspinatus, teres minor intact.     Bursa: No subacromial or subdeltoid bursal fluid.     Musculature: Muscle bulk of rotator cuff is preserved.  Deltoid muscle  bulk is also preserved. Trace edema at the signal along the  infraspinatus tendinous junction, likely mild myotendinous junction  strain.     Acromioclavicular joint  There are mild degenerative changes of the acromioclavicular joint.  Acromion is type 2 in sagittal morphology.  Coracoacromial ligament is  not thickened.     OSSEOUS STRUCTURES  No fracture, marrow contusion or marrow infiltration. Focal  hypointensity in the humeral head and glenoid, likely bone islands.     LONG BICIPITAL TENDON  The long head of the biceps tendon is normally situated within the  bicipital groove. No complete or partial biceps tendon tear is  present.     GLENOHUMERAL JOINT  Joint fluid: Excellent distention of the joint by intra-articularly  injected gadolinium contrast.     Cartilage and subarticular bone:  No focal hyaline cartilage defects  are noted. No Hill-Sachs, reverse Hill-Sachs, or bony Bankart lesions  are seen.     Labrum: Normal variant sublabral foramen anterosuperiorly. Otherwise,  no labral tear.     ANCILLARY FINDINGS:  Edema in the anterior deltoid and subcutaneous tissue,  likely related  to arthrogram injection.                                                                      Impression:   1. Low grade partial-thickness bursal sided tear of the supraspinatus  tendon at the footprint with medial delamination tear extension,  similar to prior considering difference in technique.   2. Query very mild infraspinatus myotendinous junction strain, similar  finding was likely present on prior study.     I have personally reviewed the examination and initial interpretation  and I agree with the findings.     STEFANIE FALCON

## 2020-01-13 NOTE — TELEPHONE ENCOUNTER
May advise of results.  No apparent labrum tear. There appears to be mild, partial-thickness supraspinatus tendon tearing. In retrospect, this may have been present previously. See report for details.   Any change after the arthrogram? In particular, with that injection, any change in pain (specifically any improvement)?  Options: 1) return to clinic, discuss, recheck; 2) return to PT; 3) repeat steroid injection (not a long term solution, and could elevate blood sugar; if considering would offer with US guidance with one of my colleagues); 4) try subacromial ketorolac (toradol) injection (this would need to be ordered through pharmacy, and then injected; again, would offer imaging guidance for this); 5) referral to see ortho surgeon given ongoing pain despite therapy and trying injection (though without significant tearing, I would still hope this could calm down with other intervention).  Thanks.  Elijah Ospina, , CAQ

## 2020-01-15 ENCOUNTER — OFFICE VISIT (OUTPATIENT)
Dept: ORTHOPEDICS | Facility: CLINIC | Age: 35
End: 2020-01-15
Payer: COMMERCIAL

## 2020-01-15 VITALS
WEIGHT: 220 LBS | BODY MASS INDEX: 31.5 KG/M2 | DIASTOLIC BLOOD PRESSURE: 92 MMHG | SYSTOLIC BLOOD PRESSURE: 130 MMHG | HEIGHT: 70 IN

## 2020-01-15 DIAGNOSIS — M25.512 LEFT SHOULDER PAIN, UNSPECIFIED CHRONICITY: Primary | ICD-10-CM

## 2020-01-15 DIAGNOSIS — M67.912 TENDINOPATHY OF LEFT ROTATOR CUFF: ICD-10-CM

## 2020-01-15 PROCEDURE — 99213 OFFICE O/P EST LOW 20 MIN: CPT | Performed by: PEDIATRICS

## 2020-01-15 ASSESSMENT — MIFFLIN-ST. JEOR: SCORE: 1943.13

## 2020-01-15 NOTE — PROGRESS NOTES
"Sports Medicine Clinic Visit    PCP: Danyell Banuelos Angelique is a 34 year old male who is seen in f/u up for Left shoulder pain, unspecified chronicity. Since last visit on 10/8/19 patient has undergone an MRI arthrogram.  He did continue with PT and after the 4th visit he had an increase in pain.  He has continued to have pain in his left shoulder lateral.  Does feel a band around his upper biceps/lateral delt.   Did have about 3-4 weeks of relief from the injection.      **    After his most recent injection, blood sugar increased.    **  Did have some relief from prior subacromial steroid injection, but not as long lasting as desired. Also has done some PT. Still with symptoms, hence the imaging with contrast for further evaluation. See report below.      Review of Systems  All other systems reviewed and are negative unless noted above.    Past Medical History:   Diagnosis Date     Asthma      Diabetes (H) 3/20/2014     Hypertension 4/12/2017     No significant past surgical history  Objective  BP (!) 130/92   Ht 1.784 m (5' 10.25\")   Wt 99.8 kg (220 lb)   BMI 31.34 kg/m      GENERAL APPEARANCE: healthy, alert and no distress   GAIT: NORMAL  SKIN: no suspicious lesions or rashes  NEURO: Normal strength and tone, mentation intact and speech normal  PSYCH:  mentation appears normal and affect normal/bright  HEENT: no scleral icterus  CV: Distal perfusion intact.   RESP: nonlabored breathing       Exam  Left shoulder:  Some pain with rotation, overhead motion  Some pain with impingement positions      Radiology  Visualized MRI of the left shoulder on 1/10/20, and reviewed images and report with patient.  MRI demonstrates cuff irregularity.     Results for orders placed or performed during the hospital encounter of 01/10/20   MR Shoulder Left w Contrast    Narrative    EXAM: MR left shoulder with contrast 1/10/2020 11:46 AM    TECHNIQUE: Multiplanar, multisequence imaging of the left shoulder  were " obtained with the administration of intra-articular gadolinium  contrast using routine protocol.    History: Shoulder pain, acute, persistent, xray and exam nonspecific;  Left shoulder pain, unspecified chronicity    Additional Clinical information from EMR: Throwing injury    Comparison: MRI of the left shoulder 10/2/2019    Findings:    ROTATOR CUFF and ASSOCIATED STRUCTURES  Rotator cuff: Low grade partial-thickness bursal sided tear of the  supraspinatus tendon at the footprint with medial delamination tear  extension. The subscapularis, infraspinatus, teres minor intact.    Bursa: No subacromial or subdeltoid bursal fluid.    Musculature: Muscle bulk of rotator cuff is preserved.  Deltoid muscle  bulk is also preserved. Trace edema at the signal along the  infraspinatus tendinous junction, likely mild myotendinous junction  strain.    Acromioclavicular joint  There are mild degenerative changes of the acromioclavicular joint.  Acromion is type 2 in sagittal morphology.  Coracoacromial ligament is  not thickened.    OSSEOUS STRUCTURES  No fracture, marrow contusion or marrow infiltration. Focal  hypointensity in the humeral head and glenoid, likely bone islands.    LONG BICIPITAL TENDON  The long head of the biceps tendon is normally situated within the  bicipital groove. No complete or partial biceps tendon tear is  present.    GLENOHUMERAL JOINT  Joint fluid: Excellent distention of the joint by intra-articularly  injected gadolinium contrast.    Cartilage and subarticular bone:  No focal hyaline cartilage defects  are noted. No Hill-Sachs, reverse Hill-Sachs, or bony Bankart lesions  are seen.    Labrum: Normal variant sublabral foramen anterosuperiorly. Otherwise,  no labral tear.    ANCILLARY FINDINGS:  Edema in the anterior deltoid and subcutaneous tissue, likely related  to arthrogram injection.      Impression    Impression:   1. Low grade partial-thickness bursal sided tear of the supraspinatus  tendon  at the footprint with medial delamination tear extension,  similar to prior considering difference in technique.   2. Query very mild infraspinatus myotendinous junction strain, similar  finding was likely present on prior study.    I have personally reviewed the examination and initial interpretation  and I agree with the findings.    STEFANIE TERRIE         Assessment:  1. Left shoulder pain, unspecified chronicity    2. Tendinopathy of left rotator cuff        Plan:  Discussed the assessment with the patient. Some underlying cuff changes, likely the cause of ongoing symptoms. No apparent labrum abnormality noted.  With ongoing symptoms, we discussed considerations of therapy (anticipate return to PT; will try to schedule, but if needing new referral, can provide), injection therapy (repeat subacromial steroid, vs toradol, vs PRP), and seeing ortho surgeon. Desiring to avoid the last option, he plans return to PT. Is interested in trying toradol subacromial injection. Some studies have shown good efficacy with 60 mg injection, and can avoid the elevated blood sugars he experienced with last injection. Will set him up with one of my colleagues for the injection. From there, monitor ~1 month.  **    Radiologic images reviewed and discussed with patient today   We discussed the following: symptom treatment, activity modification/rest, rehab, injection therapy and medication. Following discussion, plan:     Topical treatments: Ice/heat prn  OTC medication prn  Medication:   Rehab: plan return to PT  Injection: subacromial toradol with one of my colleagues for US guidance. Clinic can obtain medication for injection.  Discussed the possibility of a PRP injection. Will hold off for now  Discussed a possible referral to an orthopedic surgeon. May be a future consideration.  Follow up: if not improving with injection and therapy  Future considerations: PRP injection, referral  Questions answered.  Patient demonstrated  understanding of these issues and agrees with the plan.     Elijah Ospina, , CAQ            Note: Time spent in one-on-one evaluation and discussion with patient regarding nature of problem, course, prior treatments, and therapeutic options, along with review of medical record, at least 50% of which was spent in counseling and coordination of care: 15 minutes.        Disclaimer: This note consists of symbols derived from keyboarding, dictation and/or voice recognition software. As a result, there may be errors in the script that have gone undetected. Please consider this when interpreting information found in this chart.    The information in this document, created by a scribe for me, accurately reflects the services I personally performed and the decisions made by me. I have reviewed and approved this document for accuracy.

## 2020-01-15 NOTE — LETTER
"    1/15/2020         RE: Tio Merino  [address] [facility] [address]        Dear Colleague,    Thank you for referring your patient, Tio Merino, to the [facility] [place]. Please see a copy of my visit note below.    Sports Medicine Clinic Visit    PCP: Danyell Banuelos    Tio Merino is a 34 year old male who is seen in f/u up for Left shoulder pain, unspecified chronicity. Since last visit on 10/8/19 patient has undergone an MRI arthrogram.  He did continue with PT and after the 4th visit he had an increase in pain.  He has continued to have pain in his left shoulder lateral.  Does feel a band around his upper biceps/lateral delt.   Did have about 3-4 weeks of relief from the injection.      **    After his most recent injection, blood sugar increased.    **  Did have some relief from prior subacromial steroid injection, but not as long lasting as desired. Also has done some PT. Still with symptoms, hence the imaging with contrast for further evaluation. See report below.      Review of Systems  All other systems reviewed and are negative unless noted above.    Past Medical History:   Diagnosis Date     Asthma      Diabetes (H) 3/20/2014     Hypertension 4/12/2017     No significant past surgical history  Objective  BP (!) 130/92   Ht 1.784 m (5' 10.25\")   Wt 99.8 kg (220 lb)   BMI 31.34 kg/m       GENERAL APPEARANCE: healthy, alert and no distress   GAIT: NORMAL  SKIN: no suspicious lesions or rashes  NEURO: Normal strength and tone, mentation intact and speech normal  PSYCH:  mentation appears normal and affect normal/bright  HEENT: no scleral icterus  CV: Distal perfusion intact.   RESP: nonlabored breathing       Exam  Left shoulder:  Some pain with rotation, overhead motion  Some pain with impingement positions      Radiology  Visualized MRI of the left shoulder on 1/10/20, and reviewed images and report with patient.  MRI demonstrates cuff irregularity.   "   Results for orders placed or performed during the hospital encounter of 01/10/20   MR Shoulder Left w Contrast    Narrative    EXAM: MR left shoulder with contrast 1/10/2020 11:46 AM    TECHNIQUE: Multiplanar, multisequence imaging of the left shoulder  were obtained with the administration of intra-articular gadolinium  contrast using routine protocol.    History: Shoulder pain, acute, persistent, xray and exam nonspecific;  Left shoulder pain, unspecified chronicity    Additional Clinical information from EMR: Throwing injury    Comparison: MRI of the left shoulder 10/2/2019    Findings:    ROTATOR CUFF and ASSOCIATED STRUCTURES  Rotator cuff: Low grade partial-thickness bursal sided tear of the  supraspinatus tendon at the footprint with medial delamination tear  extension. The subscapularis, infraspinatus, teres minor intact.    Bursa: No subacromial or subdeltoid bursal fluid.    Musculature: Muscle bulk of rotator cuff is preserved.  Deltoid muscle  bulk is also preserved. Trace edema at the signal along the  infraspinatus tendinous junction, likely mild myotendinous junction  strain.    Acromioclavicular joint  There are mild degenerative changes of the acromioclavicular joint.  Acromion is type 2 in sagittal morphology.  Coracoacromial ligament is  not thickened.    OSSEOUS STRUCTURES  No fracture, marrow contusion or marrow infiltration. Focal  hypointensity in the humeral head and glenoid, likely bone islands.    LONG BICIPITAL TENDON  The long head of the biceps tendon is normally situated within the  bicipital groove. No complete or partial biceps tendon tear is  present.    GLENOHUMERAL JOINT  Joint fluid: Excellent distention of the joint by intra-articularly  injected gadolinium contrast.    Cartilage and subarticular bone:  No focal hyaline cartilage defects  are noted. No Hill-Sachs, reverse Hill-Sachs, or bony Bankart lesions  are seen.    Labrum: Normal variant sublabral foramen  anterosuperiorly. Otherwise,  no labral tear.    ANCILLARY FINDINGS:  Edema in the anterior deltoid and subcutaneous tissue, likely related  to arthrogram injection.      Impression    Impression:   1. Low grade partial-thickness bursal sided tear of the supraspinatus  tendon at the footprint with medial delamination tear extension,  similar to prior considering difference in technique.   2. Query very mild infraspinatus myotendinous junction strain, similar  finding was likely present on prior study.    I have personally reviewed the examination and initial interpretation  and I agree with the findings.    STEFANIE FALCON         Assessment:  1. Left shoulder pain, unspecified chronicity    2. Tendinopathy of left rotator cuff        Plan:  Discussed the assessment with the patient. Some underlying cuff changes, likely the cause of ongoing symptoms. No apparent labrum abnormality noted.  With ongoing symptoms, we discussed considerations of therapy (anticipate return to PT; will try to schedule, but if needing new referral, can provide), injection therapy (repeat subacromial steroid, vs toradol, vs PRP), and seeing ortho surgeon. Desiring to avoid the last option, he plans return to PT. Is interested in trying toradol subacromial injection. Some studies have shown good efficacy with 60 mg injection, and can avoid the elevated blood sugars he experienced with last injection. Will set him up with one of my colleagues for the injection. From there, monitor ~1 month.  **    Radiologic images reviewed and discussed with patient today   We discussed the following: symptom treatment, activity modification/rest, rehab, injection therapy and medication. Following discussion, plan:     Topical treatments: Ice/heat prn  OTC medication prn  Medication:   Rehab: plan return to PT  Injection: subacromial toradol with one of my colleagues for US guidance. Clinic can obtain medication for injection.  Discussed the possibility of  a PRP injection. Will hold off for now  Discussed a possible referral to an orthopedic surgeon. May be a future consideration.  Follow up: if not improving with injection and therapy  Future considerations: PRP injection, referral  Questions answered.  Patient demonstrated understanding of these issues and agrees with the plan.     Elijah Ospina DO, CAQ            Note: Time spent in one-on-one evaluation and discussion with patient regarding nature of problem, course, prior treatments, and therapeutic options, along with review of medical record, at least 50% of which was spent in counseling and coordination of care: 15 minutes.        Disclaimer: This note consists of symbols derived from keyboarding, dictation and/or voice recognition software. As a result, there may be errors in the script that have gone undetected. Please consider this when interpreting information found in this chart.    The information in this document, created by a scribe for me, accurately reflects the services I personally performed and the decisions made by me. I have reviewed and approved this document for accuracy.          Again, thank you for allowing me to participate in the care of your patient.        Sincerely,        Elijah Ospina DO

## 2020-01-28 ENCOUNTER — OFFICE VISIT (OUTPATIENT)
Dept: ORTHOPEDICS | Facility: CLINIC | Age: 35
End: 2020-01-28
Payer: COMMERCIAL

## 2020-01-28 DIAGNOSIS — M67.912 TENDINOPATHY OF LEFT ROTATOR CUFF: Primary | ICD-10-CM

## 2020-01-28 PROCEDURE — 20611 DRAIN/INJ JOINT/BURSA W/US: CPT | Mod: LT | Performed by: FAMILY MEDICINE

## 2020-01-28 RX ORDER — ROPIVACAINE HYDROCHLORIDE 5 MG/ML
3 INJECTION, SOLUTION EPIDURAL; INFILTRATION; PERINEURAL
Status: DISCONTINUED | OUTPATIENT
Start: 2020-01-28 | End: 2020-09-10

## 2020-01-28 RX ADMIN — ROPIVACAINE HYDROCHLORIDE 3 ML: 5 INJECTION, SOLUTION EPIDURAL; INFILTRATION; PERINEURAL at 08:10

## 2020-01-28 NOTE — PROGRESS NOTES
Large Joint Injection/Arthocentesis: L subacromial bursa  Date/Time: 1/28/2020 8:10 AM  Performed by: Vick Saravia MD  Authorized by: Vick Saravia MD     Indications:  Pain  Needle Size:  25 G  Guidance: ultrasound    Approach:  Lateral  Location:  Shoulder      Site:  L subacromial bursa  Medications:  3 mL ropivacaine 5 MG/ML  Medications comment:  1 mL of Toradol used  Lot [id number]   Expiration 05/2021    Actual amount of ropivacaine used 4 mL  Outcome:  Tolerated well, no immediate complications  Procedure discussed: discussed risks, benefits, and alternatives    Consent Given by:  Patient  Timeout: timeout called immediately prior to procedure    Prep: patient was prepped and draped in usual sterile fashion     Patient reported significant improvement of pain after right subacromial toradol injection.  Aftercare instructions given to patient.  Plan to follow-up as previously discussed with referring provider.     Vick Saravia MD [facility]

## 2020-01-28 NOTE — LETTER
1/28/2020         RE: Tio Merino  [address]  [place] [address]        Dear Colleague,    Thank you for referring your patient, Tio Merino, to the [facility] [place]. Please see a copy of my visit note below.    Large Joint Injection/Arthocentesis: L subacromial bursa  Date/Time: 1/28/2020 8:10 AM  Performed by: Vick Saravia MD  Authorized by: Vick Saravia MD     Indications:  Pain  Needle Size:  25 G  Guidance: ultrasound    Approach:  Lateral  Location:  Shoulder      Site:  L subacromial bursa  Medications:  3 mL ropivacaine 5 MG/ML  Medications comment:  1 mL of Toradol used  Lot [id number]   Expiration 05/2021    Actual amount of ropivacaine used 4 mL  Outcome:  Tolerated well, no immediate complications  Procedure discussed: discussed risks, benefits, and alternatives    Consent Given by:  Patient  Timeout: timeout called immediately prior to procedure    Prep: patient was prepped and draped in usual sterile fashion     Patient reported significant improvement of pain after right subacromial toradol injection.  Aftercare instructions given to patient.  Plan to follow-up as previously discussed with referring provider.     Vick Saravia MD [facility]            Again, thank you for allowing me to participate in the care of your patient.        Sincerely,        Vick Saravia MD

## 2020-01-28 NOTE — PATIENT INSTRUCTIONS
[facility] [place]    Procedure: Left Subacromial (above the shoulder) Toradol Injection      You may shower, however avoid swimming, tub baths or hot tubs for 24 hours following your procedure    You may have a mild to moderate increase in pain for several days following the injection.    It may take up to 14 days for the steroid medication to start working although you may feel the effect as early as a few days after the procedure.    You may use ice packs for 10-15 minutes, 3 to 4 times a day at the injection site for comfort    You may use anti-inflammatory medications (such as Ibuprofen or Aleve or Advil) or Tylenol for pain control if necessary    If you were fasting, you may resume your normal diet and medications after the procedure    If you have diabetes, check your blood sugar more frequently than usual as your blood sugar may be higher than normal for 10-14 days following a steroid injection. Contact your doctor who manages your diabetes if your blood sugar is higher than usual      If you experience any of the following, call [facility] @ [phone] or [phone]  -Fever over 100 degree F  -Swelling, bleeding, redness, drainage, warmth at the injection site  - New or worsening pain

## 2020-02-01 ENCOUNTER — TRANSFERRED RECORDS (OUTPATIENT)
Dept: MULTI SPECIALTY CLINIC | Facility: CLINIC | Age: 35
End: 2020-02-01

## 2020-02-01 LAB — RETINOPATHY: NORMAL

## 2020-03-02 ENCOUNTER — HEALTH MAINTENANCE LETTER (OUTPATIENT)
Age: 35
End: 2020-03-02

## 2020-03-09 PROBLEM — M25.512 ACUTE PAIN OF LEFT SHOULDER: Status: RESOLVED | Noted: 2019-10-29 | Resolved: 2020-03-09

## 2020-03-09 NOTE — PROGRESS NOTES
Discharge Note    Progress reporting period is from initial evaluation date (please see noted date below) to 2019.  Linked Episodes   Type: Episode: Status: Noted: Resolved: Last update: Updated by:   PHYSICAL THERAPY L shoulder 10/29/19 Active 10/29/2019  2019  8:04 AM Westley Haddad, PT      Comments:       Tio failed to follow up and current status is unknown.  Please see information below for last relevant information on current status.  Patient seen for 4 visits.    SUBJECTIVE  Subjective changes noted by patient:  Starting to see some changes in the ROM and pain.  Although he reached back into the car seat and felt a strong pain the other day  .  Current pain level is 2/10.     Previous pain level was  3/10.   Changes in function:  Yes (See Goal flowsheet attached for changes in current functional level)  Adverse reaction to treatment or activity: None    OBJECTIVE  Changes noted in objective findings: AROM L shoulder flex: beginnin, end: 151.  Improved L post GH glides     ASSESSMENT/PLAN  Diagnosis: L shoulder pain   Updated problem list and treatment plan:   Pain - HEP  STG/LTGs have been met or progress has been made towards goals:  Yes, please see goal flowsheet for most current information  Assessment of Progress: current status is unknown.    Last current status:     Self Management Plans:  HEP  I have re-evaluated this patient and find that the nature, scope, duration and intensity of the therapy is appropriate for the medical condition of the patient.  Tio continues to require the following intervention to meet STG and LTG's:  HEP.    Recommendations:  Discharge with current home program.  Patient to follow up with MD as needed.    Please refer to the daily flowsheet for treatment today, total treatment time and time spent performing 1:1 timed codes.

## 2020-04-17 ENCOUNTER — NURSE TRIAGE (OUTPATIENT)
Dept: NURSING | Facility: CLINIC | Age: 35
End: 2020-04-17

## 2020-04-17 DIAGNOSIS — E11.9 TYPE 2 DIABETES MELLITUS WITHOUT COMPLICATION, WITHOUT LONG-TERM CURRENT USE OF INSULIN (H): ICD-10-CM

## 2020-04-17 RX ORDER — DULAGLUTIDE 0.75 MG/.5ML
0.75 INJECTION, SOLUTION SUBCUTANEOUS
Qty: 6 ML | Refills: 1 | Status: SHIPPED | OUTPATIENT
Start: 2020-04-17 | End: 2020-09-10

## 2020-04-17 RX ORDER — DULAGLUTIDE 0.75 MG/.5ML
0.75 INJECTION, SOLUTION SUBCUTANEOUS
Qty: 2 ML | Refills: 0 | Status: SHIPPED | OUTPATIENT
Start: 2020-04-17 | End: 2020-09-10

## 2020-04-17 NOTE — TELEPHONE ENCOUNTER
Routing refill request to provider for review/approval because:  Labs out of range:  A1C  Hemoglobin A1C   Date Value Ref Range Status   10/24/2019 8.8 (H) 0 - 5.6 % Final     Comment:     Normal <5.7% Prediabetes 5.7-6.4%  Diabetes 6.5% or higher - adopted from ADA   consensus guidelines.       Patient will be out of medication this weekend.   Last OV 10/24/19 with the following instructions:      Return in about 6 months (around 4/24/2020), or if symptoms worsen or fail to improve.     Francheska Khalil RN BSN

## 2020-04-17 NOTE — TELEPHONE ENCOUNTER
Reason for call;  Pt called requesting a refill of Trulicity Rx sent to his [facility] pharmacy , unable to fill due to need A1c lab for Rn protocol . Please call back Pt to advise within the hour , will be out this weekend .       Caller Verbalizes understanding and denies further questions and will call back if further symptoms to triage or questions  .  Rosalind Merrill RN  - [place] Nurse Advisor

## 2020-04-17 NOTE — TELEPHONE ENCOUNTER
Tio states he is doing very well on Trulicity and wonders if we can refill.  Please call either way.

## 2020-04-17 NOTE — TELEPHONE ENCOUNTER
Reason for call;  Pt called requesting a refill of Trulicity Rx sent to his [facility] pharmacy , unable to fill due to need A1c lab for Rn protocol . Please call back Pt to advise within the hour , will be out this weekend .       Caller Verbalizes understanding and denies further questions and will call back if further symptoms to triage or questions  .  Rosalind Merrill RN  - [place] Nurse Advisor     4/21/20 Pt  new order for Trulicity on 4/17/20 . Left message on his phone to contact clinic ( my chart or phone clinic )  to see if A1C future order on chart , needs to be done now   Verbalizes understanding and denies further questions and will call back if further symptoms to triage or questions  ..  Please be sure to close this encounter once this patient's issue / question has been addressed.   Rosalind Merrill RN  - [place] Nurse Advisor

## 2020-04-17 NOTE — TELEPHONE ENCOUNTER
Called and talked to patient. Told him that his medication Trulicity was refilled and he can go pick it up at his pharmacy.  Sandra Hernandez, CMA

## 2020-04-17 NOTE — TELEPHONE ENCOUNTER
Patient calling on status of this request as he is worried about going into the weekend without it.

## 2020-04-17 NOTE — TELEPHONE ENCOUNTER
Reason for call;  Pt called requesting a refill of Trulicity Rx sent to his [facility] pharmacy , unable to fill due to need A1c lab for Rn protocol . Please call back Pt to advise within the hour , will be out this weekend .    Daylin Del Cid, RN  [place] [facility]

## 2020-04-17 NOTE — TELEPHONE ENCOUNTER
Told to call back triage line for refill on Trulicity if he has not heard back within the hour. Given number to [facility]. Verified with clinic that he could get the refill by directly speaking to them.    Sharmila Guerra RN

## 2020-04-17 NOTE — TELEPHONE ENCOUNTER
Trulicity refilled. Please call and let him know Rx was sent to pharmacy.    COLLEEN Diaz on 4/17/2020 at 2:07 PM

## 2020-05-14 DIAGNOSIS — I10 BENIGN ESSENTIAL HYPERTENSION: ICD-10-CM

## 2020-05-14 DIAGNOSIS — E78.5 HYPERLIPIDEMIA LDL GOAL <100: ICD-10-CM

## 2020-05-14 NOTE — LETTER
May 18, 2020    Tio eMrino  [address] [facility] [address]    Dear Tio,       We recently received a refill request for Atorvastatin (LIPITOR) 10 MG tablet and hydrochlorothiazide (HYDRODIURIL) .  We have refilled this for a one time 30 day supply only because you are due for a:    Diabetic office visit with Labs with your primary physician      Please call at your earliest convenience so that there will not be a delay with your future refills.          Thank you,   Your [facility] Team/mkr  [phone]

## 2020-05-15 NOTE — TELEPHONE ENCOUNTER
Routing refill request to provider for review/approval because:    Patient failed:  Blood pressure under 140/90 in past 12 months  BP Readings from Last 3 Encounters:   01/15/20 (!) 130/92   10/24/19 136/84   10/08/19 118/78     Maribell Gomez BSN, RN

## 2020-05-16 RX ORDER — ATORVASTATIN CALCIUM 10 MG/1
TABLET, FILM COATED ORAL
Qty: 30 TABLET | Refills: 0 | Status: SHIPPED | OUTPATIENT
Start: 2020-05-16 | End: 2020-09-10

## 2020-05-16 RX ORDER — HYDROCHLOROTHIAZIDE 25 MG/1
TABLET ORAL
Qty: 30 TABLET | Refills: 0 | Status: SHIPPED | OUTPATIENT
Start: 2020-05-16 | End: 2020-09-10

## 2020-05-16 NOTE — TELEPHONE ENCOUNTER
This patient is no longer seeing me.   I will send a prescription for 1 month. (I'm not sure if his PCP will be back in clinic within the next week)  Please contact him to schedule an appointment with his primary provider within the next month.   After reviewing his chart, he is most likely going to need refills of his meds for diabetes also.   Kristen Kehr PA-C

## 2020-05-26 ENCOUNTER — TELEPHONE (OUTPATIENT)
Dept: FAMILY MEDICINE | Facility: CLINIC | Age: 35
End: 2020-05-26

## 2020-05-26 NOTE — LETTER
June 1, 2020      Tio Merino  [address] [facility] [address]        Dear Tio,       In order to ensure we are providing the best quality care, we have reviewed your chart and see that you are due for the following.    1. Office visit for diabetes, blood pressure, asthma, and cholesterol   2. Fasting lab appointment-cholesterol, A1C  3. Eye exam-recommended yearly for diabetics. Please let us know if you have completed this or need a referral.     The good news is we have ways for you to virtually meet with your provider without traveling to the clinic. If you prefer we can still do a face to face visit. Danyell Banuelos will be seeing patients face to face Melodie 15-26.    Video Visit:  Please schedule a video visit. Request an appointment through [facility] or call us at [phone].    A video visit is a two-way, real-time, interactive video and audio appointment with your provider, using a smartphone. Video visits will be billed the same as in-person visits, including deductibles and co-insurance.     Telephone Visit:  You can schedule a telephone visit. Request an appointment through [facility] or call us at [phone].    A telephone visit is a two-way, real-time, audio scheduled appointment with your provider. Telephone visits are billed at different rates depending on your insurance coverage. During this emergency period, for some insurers, patients may be billed the same as an in-person visit. Please reach out to your insurance provider with any questions.       Thank you for choosing us your partner in health care.     Your [facility] Care Team

## 2020-05-26 NOTE — TELEPHONE ENCOUNTER
Panel Management Review  Summary:    Patient is due/failing the following:   Virtual/office due for diabetes, blood pressure, cholesterol, and asthma  Fasting lab appointment  Health Maintenance Due   Topic Date Due     PREVENTIVE CARE VISIT  1985     PHQ-2  01/01/2020     A1C  01/24/2020     LIPID  01/24/2020     ASTHMA CONTROL TEST  04/24/2020     EYE EXAM  05/30/2020        Type of outreach:    Phone, left message for patient to call back.  and Sent [email or web address] message.                                                                                                                   Rakel Sim    Chart routed to Care Team .

## 2020-05-28 NOTE — TELEPHONE ENCOUNTER
Type of outreach:    Phone, left message for patient to call back.        Patient is due/failing the following:   Virtual/office due for diabetes, blood pressure, cholesterol, and asthma  Fasting lab appointment-lipid, a1c  Eye exam  ACT

## 2020-06-02 ENCOUNTER — OFFICE VISIT (OUTPATIENT)
Dept: ORTHOPEDICS | Facility: CLINIC | Age: 35
End: 2020-06-02
Payer: COMMERCIAL

## 2020-06-02 DIAGNOSIS — M67.912 TENDINOPATHY OF LEFT ROTATOR CUFF: Primary | ICD-10-CM

## 2020-06-02 DIAGNOSIS — M25.512 LEFT SHOULDER PAIN, UNSPECIFIED CHRONICITY: ICD-10-CM

## 2020-06-02 PROCEDURE — 20611 DRAIN/INJ JOINT/BURSA W/US: CPT | Mod: LT | Performed by: FAMILY MEDICINE

## 2020-06-02 RX ORDER — BETAMETHASONE SODIUM PHOSPHATE AND BETAMETHASONE ACETATE 3; 3 MG/ML; MG/ML
6 INJECTION, SUSPENSION INTRA-ARTICULAR; INTRALESIONAL; INTRAMUSCULAR; SOFT TISSUE
Status: DISCONTINUED | OUTPATIENT
Start: 2020-06-02 | End: 2020-09-10

## 2020-06-02 RX ORDER — ROPIVACAINE HYDROCHLORIDE 5 MG/ML
3 INJECTION, SOLUTION EPIDURAL; INFILTRATION; PERINEURAL
Status: DISCONTINUED | OUTPATIENT
Start: 2020-06-02 | End: 2020-09-10

## 2020-06-02 RX ADMIN — ROPIVACAINE HYDROCHLORIDE 3 ML: 5 INJECTION, SOLUTION EPIDURAL; INFILTRATION; PERINEURAL at 16:50

## 2020-06-02 RX ADMIN — BETAMETHASONE SODIUM PHOSPHATE AND BETAMETHASONE ACETATE 6 MG: 3; 3 INJECTION, SUSPENSION INTRA-ARTICULAR; INTRALESIONAL; INTRAMUSCULAR; SOFT TISSUE at 16:50

## 2020-06-02 NOTE — PATIENT INSTRUCTIONS
[facility] Injection Discharge Instructions    Procedure: Left Subacromial Steroid Injection      You may shower, however avoid swimming, tub baths or hot tubs for 24 hours following your procedure    You may have a mild to moderate increase in pain for several days following the injection.    It may take up to 14 days for the steroid medication to start working although you may feel the effect as early as a few days after the procedure.    You may use ice packs for 10-15 minutes, 3 to 4 times a day at the injection site for comfort    You may use anti-inflammatory medications (such as Ibuprofen or Aleve or Advil) or Tylenol for pain control if necessary    If you were fasting, you may resume your normal diet and medications after the procedure    If you have diabetes, check your blood sugar more frequently than usual as your blood sugar may be higher than normal for 10-14 days following a steroid injection. Contact your doctor who manages your diabetes if your blood sugar is higher than usual      If you experience any of the following, call [facility] @ [phone] or [phone]  -Fever over 100 degree F  -Swelling, bleeding, redness, drainage, warmth at the injection site  - New or worsening pain

## 2020-06-02 NOTE — LETTER
6/2/2020         RE: Tio Merino  [address]  [place] [address]        Dear Colleague,    Thank you for referring your patient, Tio Merino, to the [facility] [place]. Please see a copy of my visit note below.    Large Joint Injection/Arthocentesis: L subacromial bursa    Date/Time: 6/2/2020 4:50 PM  Performed by: Vick Saravia MD  Authorized by: Vick Saravia MD     Indications:  Pain  Needle Size:  25 G  Guidance: ultrasound    Approach:  Posterolateral  Location:  Shoulder      Site:  L subacromial bursa  Medications:  6 mg betamethasone acet & sod phos 6 (3-3) MG/ML; 3 mL ropivacaine 5 MG/ML  Medications comment:  Actual amount of 4 mL  Outcome:  Tolerated well, no immediate complications  Procedure discussed: discussed risks, benefits, and alternatives    Consent Given by:  Patient  Timeout: timeout called immediately prior to procedure    Prep: patient was prepped and draped in usual sterile fashion     Patient reported improvement of pain after left subacromial steroid injection.  Aftercare instructions given to patient.  Plan to follow-up as discussed above.     Vick Saravia MD [facility]            Again, thank you for allowing me to participate in the care of your patient.        Sincerely,        Vick Saravia MD

## 2020-06-02 NOTE — PROGRESS NOTES
Large Joint Injection/Arthocentesis: L subacromial bursa    Date/Time: 6/2/2020 4:50 PM  Performed by: Vick Saravia MD  Authorized by: Vick Saravia MD     Indications:  Pain  Needle Size:  25 G  Guidance: ultrasound    Approach:  Posterolateral  Location:  Shoulder      Site:  L subacromial bursa  Medications:  6 mg betamethasone acet & sod phos 6 (3-3) MG/ML; 3 mL ropivacaine 5 MG/ML  Medications comment:  Actual amount of 4 mL  Outcome:  Tolerated well, no immediate complications  Procedure discussed: discussed risks, benefits, and alternatives    Consent Given by:  Patient  Timeout: timeout called immediately prior to procedure    Prep: patient was prepped and draped in usual sterile fashion     Patient reported improvement of pain after left subacromial steroid injection.  Aftercare instructions given to patient.  Plan to follow-up as discussed above.     Vick Saravia MD [facility]

## 2020-06-08 ENCOUNTER — TELEPHONE (OUTPATIENT)
Dept: ORTHOPEDICS | Facility: CLINIC | Age: 35
End: 2020-06-08

## 2020-06-08 DIAGNOSIS — M67.912 TENDINOPATHY OF LEFT ROTATOR CUFF: Primary | ICD-10-CM

## 2020-06-08 NOTE — TELEPHONE ENCOUNTER
Patient scheduled for follow up CSI on 6/8/20 with Dr. Ospina.  Did have an injection with Dr. Saravia on 6/2/20.  Called patient to verify appointment.  He states that he scheduled with SA and was then able to get an appointment with [facility].  Does not feel that he needs to be seen currently, but did have questions about if there was anything else he could do.  He has not been able to return to PT as his appointment was cancelled due to Covid-19.  Offered a new referral, he agreed.  Will begin PT and will follow up in 2-4 weeks if not improvement with therapy and injection.  Eunice Guzman MS ATC

## 2020-06-18 ENCOUNTER — TELEPHONE (OUTPATIENT)
Dept: FAMILY MEDICINE | Facility: CLINIC | Age: 35
End: 2020-06-18

## 2020-06-18 DIAGNOSIS — J45.30 MILD PERSISTENT ASTHMA WITHOUT COMPLICATION: ICD-10-CM

## 2020-06-18 RX ORDER — ALBUTEROL SULFATE 90 UG/1
2 AEROSOL, METERED RESPIRATORY (INHALATION) EVERY 6 HOURS
Qty: 8.5 G | Refills: 0 | Status: SHIPPED | OUTPATIENT
Start: 2020-06-18 | End: 2020-09-01

## 2020-06-18 NOTE — TELEPHONE ENCOUNTER
Routing refill request to provider for review/approval because:  Patient needs to be seen because:  Over due for follow up          ACT Total Scores 6/1/2018 7/25/2019 10/24/2019   ACT TOTAL SCORE - - -   ASTHMA ER VISITS - - -   ASTHMA HOSPITALIZATIONS - - -   ACT TOTAL SCORE (Goal Greater than or Equal to 20) 16 12 22   In the past 12 months, how many times did you visit the emergency room for your asthma without being admitted to the hospital? 0 0 0   In the past 12 months, how many times were you hospitalized overnight because of your asthma? 0 0 0

## 2020-06-18 NOTE — TELEPHONE ENCOUNTER
Patient calling is completely out of his albuterol inhaler ran out this am. States has had to use more this week due to heat/hummity. Would like this filled as soon as possible please.

## 2020-06-19 DIAGNOSIS — J45.40 MODERATE PERSISTENT ASTHMA WITHOUT COMPLICATION: ICD-10-CM

## 2020-06-19 RX ORDER — ALBUTEROL SULFATE 90 UG/1
AEROSOL, METERED RESPIRATORY (INHALATION)
Qty: 18 G | Refills: 0 | OUTPATIENT
Start: 2020-06-19

## 2020-06-19 NOTE — TELEPHONE ENCOUNTER
"Requested Prescriptions   Pending Prescriptions Disp Refills     ADVAIR DISKUS 100-50 MCG/DOSE inhaler [Pharmacy Med Name: Advair Diskus 100-50 MCG/DOSE Inhalation Aerosol Powder Breath Activated]  0     Sig: INHALE 1 DOSE BY MOUTH EVERY 12 HOURS       Inhaled Steroids Protocol Failed - 6/19/2020 12:38 PM        Failed - Asthma control assessment score within normal limits in last 6 months     Please review ACT score.           Passed - Patient is age 12 or older        Passed - Medication is active on med list        Passed - Recent (6 mo) or future (30 days) visit within the authorizing provider's specialty     Patient had office visit in the last 6 months or has a visit in the next 30 days with authorizing provider or within the authorizing provider's specialty.  See \"Patient Info\" tab in inbasket, or \"Choose Columns\" in Meds & Orders section of the refill encounter.           Long-Acting Beta Agonist Inhalers Protocol  Failed - 6/19/2020 12:38 PM        Failed - Asthma control assessment score within normal limits in last 6 months     Please review ACT score.           Failed - Order for Serevent, Striverdi, or Foradil and pt has steroid inhaler        Passed - Patient is age 12 or older        Passed - Medication is active on med list        Passed - Recent (6 mo) or future (30 days) visit within the authorizing provider's specialty     Patient had office visit in the last 6 months or has a visit in the next 30 days with authorizing provider or within the authorizing provider's specialty.  See \"Patient Info\" tab in inbasket, or \"Choose Columns\" in Meds & Orders section of the refill encounter.                 "

## 2020-06-23 NOTE — TELEPHONE ENCOUNTER
I am no longer seeing this patient. He will need to contact his primary provider.   He has been sent letters to make an appointment by his PCP and has not made the appointment.   Refill denied Kristen Kehr PA-C

## 2020-06-25 ENCOUNTER — DOCUMENTATION ONLY (OUTPATIENT)
Dept: FAMILY MEDICINE | Facility: CLINIC | Age: 35
End: 2020-06-25

## 2020-06-25 DIAGNOSIS — E11.9 TYPE 2 DIABETES MELLITUS WITHOUT COMPLICATION, WITHOUT LONG-TERM CURRENT USE OF INSULIN (H): ICD-10-CM

## 2020-06-25 DIAGNOSIS — E78.2 MIXED HYPERLIPIDEMIA: Primary | ICD-10-CM

## 2020-06-25 DIAGNOSIS — I10 HYPERTENSION, UNSPECIFIED TYPE: ICD-10-CM

## 2020-06-25 DIAGNOSIS — E78.2 MIXED HYPERLIPIDEMIA: ICD-10-CM

## 2020-06-25 LAB
ANION GAP SERPL CALCULATED.3IONS-SCNC: 7 MMOL/L (ref 3–14)
BUN SERPL-MCNC: 9 MG/DL (ref 7–30)
CALCIUM SERPL-MCNC: 8.9 MG/DL (ref 8.5–10.1)
CHLORIDE SERPL-SCNC: 102 MMOL/L (ref 94–109)
CHOLEST SERPL-MCNC: 238 MG/DL
CO2 SERPL-SCNC: 27 MMOL/L (ref 20–32)
CREAT SERPL-MCNC: 0.75 MG/DL (ref 0.66–1.25)
GFR SERPL CREATININE-BSD FRML MDRD: >90 ML/MIN/{1.73_M2}
GLUCOSE SERPL-MCNC: 268 MG/DL (ref 70–99)
HBA1C MFR BLD: 10.5 % (ref 0–5.6)
HDLC SERPL-MCNC: 39 MG/DL
LDLC SERPL CALC-MCNC: 151 MG/DL
NONHDLC SERPL-MCNC: 199 MG/DL
POTASSIUM SERPL-SCNC: 3.8 MMOL/L (ref 3.4–5.3)
SODIUM SERPL-SCNC: 136 MMOL/L (ref 133–144)
TRIGL SERPL-MCNC: 240 MG/DL

## 2020-06-25 PROCEDURE — 83036 HEMOGLOBIN GLYCOSYLATED A1C: CPT | Performed by: PHYSICIAN ASSISTANT

## 2020-06-25 PROCEDURE — 36415 COLL VENOUS BLD VENIPUNCTURE: CPT | Performed by: PHYSICIAN ASSISTANT

## 2020-06-25 PROCEDURE — 80048 BASIC METABOLIC PNL TOTAL CA: CPT | Performed by: NURSE PRACTITIONER

## 2020-06-25 PROCEDURE — 80061 LIPID PANEL: CPT | Performed by: NURSE PRACTITIONER

## 2020-06-25 NOTE — PROGRESS NOTES
Patient came in today 6/25/20 for bloodwork. He was expecting a cholesterol and basic and fasted for it.  Please order as future if you agree or let lab know if not needed. thanks

## 2020-06-26 NOTE — RESULT ENCOUNTER NOTE
Rafy Kinsey,    Thank you for your recent office visit.    Here are your recent results.  Your cholesterol and A1C have worsened.  I see that you were taken off the glipizide and metformin.  Are you wanting to add these medications back into your regimen?  Please let me know what you would like to do.     Feel free to contact me via 3sun or call the clinic at [phone].    Sincerely,    MARLY Dooley, FNP-BC

## 2020-08-12 DIAGNOSIS — J45.40 MODERATE PERSISTENT ASTHMA WITHOUT COMPLICATION: ICD-10-CM

## 2020-08-13 DIAGNOSIS — J45.40 MODERATE PERSISTENT ASTHMA WITHOUT COMPLICATION: ICD-10-CM

## 2020-08-13 NOTE — TELEPHONE ENCOUNTER
Requested Prescriptions   Pending Prescriptions Disp Refills     fluticasone-salmeterol (ADVAIR DISKUS) 100-50 MCG/DOSE inhaler 3 Inhaler 1     Sig: Inhale 1 puff into the lungs every 12 hours   Last Written Prescription Date:  7-1-20  Last Fill Quantity: 60,  # refills: 1   Last office visit: 7/25/2019 with prescribing provider:  7-25-19   Future Office Visit:            There is no refill protocol information for this order

## 2020-08-17 ENCOUNTER — MYC MEDICAL ADVICE (OUTPATIENT)
Dept: NURSING | Facility: CLINIC | Age: 35
End: 2020-08-17

## 2020-08-17 DIAGNOSIS — J45.40 MODERATE PERSISTENT ASTHMA WITHOUT COMPLICATION: ICD-10-CM

## 2020-08-17 NOTE — TELEPHONE ENCOUNTER
Routing refill request to provider for review/approval because:  Failed Protocol & due for appointment.     Last Written Prescription Date:  10/24/19  Last Fill Quantity: 3,  # refills: 1     Last office visit: 10/24/2019 with prescribing provider:  Danyell Banuelos NP     Future Office Visit:  None (Patient states he will call to schedule).     Francheska Khalil RN BSN

## 2020-08-17 NOTE — TELEPHONE ENCOUNTER
My Chart message sent to patient asking him to confirm his preferred pharmacy and to schedule an appointment.     Francheska Khalil RN BSN

## 2020-08-17 NOTE — TELEPHONE ENCOUNTER
Reason for Call:  Medication or medication refill:    Do you use a [facility]?  Name of the pharmacy and phone number for the current request:  [facility]    Name of the medication requested: Advair inhaler       Other request: patient is requesting refill for this inhaler stated the refill request went to the wrong provider he sees provider at the [facility] [place]    Can we leave a detailed message on this number? YES    Phone number patient can be reached at: Home number on file [phone] (home)    Best Time: any    Call taken on 8/17/2020 at 2:28 PM by Gracie Narvaez

## 2020-09-01 DIAGNOSIS — J45.30 MILD PERSISTENT ASTHMA WITHOUT COMPLICATION: ICD-10-CM

## 2020-09-01 RX ORDER — ALBUTEROL SULFATE 90 UG/1
AEROSOL, METERED RESPIRATORY (INHALATION)
Qty: 9 G | Refills: 0 | Status: CANCELLED | OUTPATIENT
Start: 2020-09-01

## 2020-09-01 RX ORDER — LEVALBUTEROL TARTRATE 45 UG/1
1-2 AEROSOL, METERED ORAL EVERY 4 HOURS PRN
Qty: 15 G | Refills: 0 | Status: SHIPPED | OUTPATIENT
Start: 2020-09-01 | End: 2020-09-10

## 2020-09-01 NOTE — TELEPHONE ENCOUNTER
Routing refill request to provider for review/approval because:  Needs provider approval.  Pended with reminder appt due.  Veda Ibarra RN

## 2020-09-01 NOTE — TELEPHONE ENCOUNTER
Patient is leaving town tomorrow morning and needs his albuterol inhaler refilled before he leaves. He has lost or misplaced his. He has an appt with Danyell on 9/8 when he gets back. Thank you. Ok to leave a detailed message.

## 2020-09-10 ENCOUNTER — OFFICE VISIT (OUTPATIENT)
Dept: FAMILY MEDICINE | Facility: CLINIC | Age: 35
End: 2020-09-10
Payer: COMMERCIAL

## 2020-09-10 VITALS
BODY MASS INDEX: 31.12 KG/M2 | SYSTOLIC BLOOD PRESSURE: 146 MMHG | WEIGHT: 217.4 LBS | HEART RATE: 86 BPM | OXYGEN SATURATION: 97 % | TEMPERATURE: 96.8 F | RESPIRATION RATE: 20 BRPM | HEIGHT: 70 IN | DIASTOLIC BLOOD PRESSURE: 86 MMHG

## 2020-09-10 DIAGNOSIS — E11.9 TYPE 2 DIABETES MELLITUS WITHOUT COMPLICATION, WITHOUT LONG-TERM CURRENT USE OF INSULIN (H): Primary | ICD-10-CM

## 2020-09-10 DIAGNOSIS — J45.40 MODERATE PERSISTENT ASTHMA WITHOUT COMPLICATION: ICD-10-CM

## 2020-09-10 DIAGNOSIS — I10 BENIGN ESSENTIAL HYPERTENSION: ICD-10-CM

## 2020-09-10 DIAGNOSIS — E78.5 HYPERLIPIDEMIA LDL GOAL <100: ICD-10-CM

## 2020-09-10 LAB
CHOLEST SERPL-MCNC: 268 MG/DL
CREAT UR-MCNC: 156 MG/DL
HDLC SERPL-MCNC: 40 MG/DL
LDLC SERPL CALC-MCNC: 167 MG/DL
MICROALBUMIN UR-MCNC: 49 MG/L
MICROALBUMIN/CREAT UR: 31.35 MG/G CR (ref 0–17)
NONHDLC SERPL-MCNC: 228 MG/DL
TRIGL SERPL-MCNC: 305 MG/DL

## 2020-09-10 PROCEDURE — 80061 LIPID PANEL: CPT | Performed by: NURSE PRACTITIONER

## 2020-09-10 PROCEDURE — 99214 OFFICE O/P EST MOD 30 MIN: CPT | Performed by: NURSE PRACTITIONER

## 2020-09-10 PROCEDURE — 82043 UR ALBUMIN QUANTITATIVE: CPT | Performed by: NURSE PRACTITIONER

## 2020-09-10 PROCEDURE — 36415 COLL VENOUS BLD VENIPUNCTURE: CPT | Performed by: NURSE PRACTITIONER

## 2020-09-10 RX ORDER — ATORVASTATIN CALCIUM 10 MG/1
10 TABLET, FILM COATED ORAL DAILY
Qty: 90 TABLET | Refills: 3 | Status: SHIPPED | OUTPATIENT
Start: 2020-09-10 | End: 2021-06-09

## 2020-09-10 RX ORDER — HYDROCHLOROTHIAZIDE 25 MG/1
25 TABLET ORAL DAILY
Qty: 90 TABLET | Refills: 3 | Status: SHIPPED | OUTPATIENT
Start: 2020-09-10 | End: 2021-09-30

## 2020-09-10 RX ORDER — LEVALBUTEROL TARTRATE 45 UG/1
1-2 AEROSOL, METERED ORAL EVERY 4 HOURS PRN
Qty: 15 G | Refills: 11 | Status: SHIPPED | OUTPATIENT
Start: 2020-09-10 | End: 2021-06-09

## 2020-09-10 RX ORDER — DULAGLUTIDE 1.5 MG/.5ML
1.5 INJECTION, SOLUTION SUBCUTANEOUS
Qty: 12 ML | Refills: 2 | Status: SHIPPED | OUTPATIENT
Start: 2020-09-10 | End: 2021-06-09

## 2020-09-10 RX ORDER — AMLODIPINE BESYLATE 2.5 MG/1
2.5 TABLET ORAL DAILY
Qty: 90 TABLET | Refills: 3 | Status: SHIPPED | OUTPATIENT
Start: 2020-09-10 | End: 2021-09-30

## 2020-09-10 ASSESSMENT — MIFFLIN-ST. JEOR: SCORE: 1926.34

## 2020-09-10 NOTE — PROGRESS NOTES
Subjective     Tio Merino is a 35 year old male who presents to clinic today for the following health issues:    HPI       Diabetes Follow-up    How often are you checking your blood sugar? One time daily- would like to increase trulicity to 1.5 mg dose as his A1C increased last time.   What time of day are you checking your blood sugars (select all that apply)?  Before meals  Have you had any blood sugars above 200?  Yes   Have you had any blood sugars below 70?  No    What symptoms do you notice when your blood sugar is low?  Shaky    What concerns do you have today about your diabetes? Blood sugar is often over 200     Do you have any of these symptoms? (Select all that apply)  No numbness or tingling in feet.  No redness, sores or blisters on feet.  No complaints of excessive thirst.  No reports of blurry vision.  No significant changes to weight.    Have you had a diabetic eye exam in the last 12 months? Yes- Date of last eye exam: 2/2020,  [facility]  Denies chest pain, headaches, vision changes, foot swelling, or other concerns.       BP Readings from Last 2 Encounters:   09/10/20 (!) 146/86   01/15/20 (!) 130/92     Hemoglobin A1C (%)   Date Value   06/25/2020 10.5 (H)   10/24/2019 8.8 (H)     LDL Cholesterol Calculated (mg/dL)   Date Value   06/25/2020 151 (H)   10/24/2019 120 (H)         How many servings of fruits and vegetables do you eat daily?  0-1    On average, how many sweetened beverages do you drink each day (Examples: soda, juice, sweet tea, etc.  Do NOT count diet or artificially sweetened beverages)?   0    How many days per week do you exercise enough to make your heart beat faster? 3 or less    How many minutes a day do you exercise enough to make your heart beat faster? 30 - 60    How many days per week do you miss taking your medication? 0      Review of Systems   Constitutional, HEENT, cardiovascular, pulmonary, GI, , musculoskeletal, neuro, skin, endocrine and psych  "systems are negative, except as otherwise noted.      Objective    BP (!) 146/86   Pulse 86   Temp 96.8  F (36  C) (Tympanic)   Resp 20   Ht 1.784 m (5' 10.25\")   Wt 98.6 kg (217 lb 6.4 oz)   SpO2 97%   BMI 30.97 kg/m    Body mass index is 30.97 kg/m .  Physical Exam   GENERAL: healthy, alert and no distress  RESP: lungs clear to auscultation - no rales, rhonchi or wheezes  CV: regular rate and rhythm, normal S1 S2, no S3 or S4, no murmur, click or rub, no peripheral edema and peripheral pulses strong  MS: no gross musculoskeletal defects noted, no edema, no CVA tenderness  PSYCH: mentation appears normal, affect normal/bright    See lab orders        Assessment & Plan     Tio was seen today for diabetes.    Diagnoses and all orders for this visit:    Type 2 diabetes mellitus without complication, without long-term current use of insulin (H)  -     Albumin Random Urine Quantitative with Creat Ratio  -     dulaglutide (TRULICITY) 1.5 MG/0.5ML pen; Inject 1.5 mg Subcutaneous every 7 days    Moderate persistent asthma without complication  -     levalbuterol (XOPENEX HFA) 45 MCG/ACT inhaler; Inhale 1-2 puffs into the lungs every 4 hours as needed for shortness of breath / dyspnea  -     fluticasone-salmeterol (ADVAIR DISKUS) 100-50 MCG/DOSE inhaler; Inhale 1 puff into the lungs every 12 hours    Hyperlipidemia LDL goal <100  -     Lipid panel reflex to direct LDL Non-fasting  -     atorvastatin (LIPITOR) 10 MG tablet; Take 1 tablet (10 mg) by mouth daily    Benign essential hypertension  -     amLODIPine (NORVASC) 2.5 MG tablet; Take 1 tablet (2.5 mg) by mouth daily  -     hydrochlorothiazide (HYDRODIURIL) 25 MG tablet; Take 1 tablet (25 mg) by mouth daily         BMI:   Estimated body mass index is 30.97 kg/m  as calculated from the following:    Height as of this encounter: 1.784 m (5' 10.25\").    Weight as of this encounter: 98.6 kg (217 lb 6.4 oz).   Weight management plan: Discussed healthy diet and " exercise guidelines        See Patient Instructions: adding second blood pressure medication.  Gave instructions for home BP monitoring and to follow up as needed for any health care questions or concerns.     Return in about 6 months (around 3/10/2021), or if symptoms worsen or fail to improve.    Danyell Banuelos, LIBBY  [facility] [place]

## 2020-11-01 ENCOUNTER — TRANSFERRED RECORDS (OUTPATIENT)
Dept: HEALTH INFORMATION MANAGEMENT | Facility: CLINIC | Age: 35
End: 2020-11-01

## 2020-11-01 LAB — RETINOPATHY: NORMAL

## 2020-12-14 ENCOUNTER — HEALTH MAINTENANCE LETTER (OUTPATIENT)
Age: 35
End: 2020-12-14

## 2020-12-22 NOTE — PATIENT INSTRUCTIONS
Patient Education     Taking Your Blood Pressure  Blood pressure is the force of blood against the artery wall as it moves from the heart through the blood vessels. You can take your own blood pressure reading using a digital monitor. Take your readings the same each time, using the same arm. Take readings as often as your healthcare provider instructs.  About blood pressure monitors  Blood pressure monitors are designed for certain ages and cases. You can find monitors for older adults, for pregnant women, and for children. Make sure the one you choose is the right one for your age and situation.  The American Heart Association recommends an automatic cuff monitor that fits on your upper arm (bicep). The cuff should fit your arm size. A cuff that s too large or too small will not give an accurate reading. Measure around your upper arm to find your size.  Monitors that attach to your finger or wrist are not as accurate as monitors for your upper arm.  Ask your healthcare provider for help in choosing a monitor. Bring your monitor to your next provider visit if you need help in using it the correct way.  The steps below are general instructions for using an automatic digital monitor.  Step 1. Relax      Take your blood pressure at the same time every day, such as in the morning or evening, or at the time your healthcare provider recommends.    Wait at least a half-hour after smoking, eating, or exercising. Don't drink coffee, tea, soda, or other caffeinated beverages before checking your blood pressure.    Sit comfortably at a table with both feet on the floor. Do not cross your legs or feet. Place the monitor near you.    Rest for a few minutes before you begin.  Step 2. Wrap the cuff      Place your arm on the table, palm up. Your arm should be at the level of your heart. Wrap the cuff around your upper arm, just above your elbow. It s best done on bare skin, not over clothing. Most cuffs will indicate where the  brachial artery (the blood vessel in the middle of the arm at the inner side of the elbow) should line up with the cuff. Look in your monitor's instruction booklet for an illustration. You can also bring your cuff to your healthcare provider and have them show you how to correctly place the cuff.  Step 3. Inflate the cuff      Push the button that starts the pump.    The cuff will tighten, then loosen.    The numbers will change. When they stop changing, your blood pressure reading will appear.    Take 2 or 3 readings one minute apart.  Step 4. Write down the results of each reading      Write down your blood pressure numbers for each reading. Note the date and time. Keep your results in one place, such as a notebook. Even if your monitor has a built-in memory, keep a hard copy of the readings.    Remove the cuff from your arm. Turn off the machine.    Bring your blood pressure records with your healthcare providers at each visit.    If you start a new blood pressure medicine, note the day you started the new medicine. Also note the day if you change the dose of your medicine. This information goes on your blood pressure recording sheet. This will help your healthcare provider monitor how well the medicine changes are working.    Ask your healthcare provider what numbers should prompt you to call him or her. Also ask what numbers should prompt you to get help right away.  Date Last Reviewed: 11/1/2016 2000-2019 [employer]. [address] [place] [address], [place] [address]. All rights reserved. This information is not intended as a substitute for professional medical care. Always follow your healthcare professional's instructions.            Yes - the patient is able to be screened

## 2021-01-01 ENCOUNTER — TRANSFERRED RECORDS (OUTPATIENT)
Dept: MULTI SPECIALTY CLINIC | Facility: CLINIC | Age: 36
End: 2021-01-01

## 2021-01-01 LAB — RETINOPATHY: NORMAL

## 2021-03-02 ENCOUNTER — TELEPHONE (OUTPATIENT)
Dept: FAMILY MEDICINE | Facility: CLINIC | Age: 36
End: 2021-03-02

## 2021-03-02 NOTE — TELEPHONE ENCOUNTER
Panel Management Review      Patient has the following on his problem list: diabetes, asthma, htn, lipid    Summary:    Patient is due/failing the following:   Physical  Follow up visit for diabetes, htn, lipid, asthma  ACT  Labs-fasting cholesterol, a1c, hep c screen,    Eye exam-recommended yearly for diabetics    Type of outreach:    Sent [employer] message.    Questions for provider review:    None                                                                                                                                    Rakel Sim MA       Chart routed to Care Team .

## 2021-03-02 NOTE — LETTER
March 16, 2021      Tio Merino  [address]  [place] [facility] [address]        Dear Tio,     We have tried to contact you by phone and/or mychart several times.    In order to ensure we are providing the best quality care, we have reviewed your chart and see that you are due for the following.    1. Your next office visit is due for a physical  2. Fasting/Non-Fasting labs- Cholesterol, A1C, Hepatitis C screen  3. Follow up visit for Diabetes, Blood Pressure, Cholesterol, and Asthma  4. Eye exam-recommended yearly for diabetics. Please let us know if you have completed this or need a referral.     For fasting labs please fast for at least 10 hours. You can still take your medications and have water.    We greatly appreciate the opportunity to serve you.  Thank you for trusting us with your health care.    If you are now being seen elsewhere please let us know and we will remove you from the list.    Sincerely,  [facility]

## 2021-03-04 DIAGNOSIS — J45.30 MILD PERSISTENT ASTHMA WITHOUT COMPLICATION: ICD-10-CM

## 2021-03-04 DIAGNOSIS — J45.40 MODERATE PERSISTENT ASTHMA WITHOUT COMPLICATION: ICD-10-CM

## 2021-03-05 RX ORDER — ALBUTEROL SULFATE 90 UG/1
AEROSOL, METERED RESPIRATORY (INHALATION)
Qty: 9 G | Refills: 0 | OUTPATIENT
Start: 2021-03-05

## 2021-03-05 NOTE — TELEPHONE ENCOUNTER
Appears the [employer] message was read this morning.    ACT Total Scores 6/1/2018 7/25/2019 10/24/2019   ACT TOTAL SCORE - - -   ASTHMA ER VISITS - - -   ASTHMA HOSPITALIZATIONS - - -   ACT TOTAL SCORE (Goal Greater than or Equal to 20) 16 12 22   In the past 12 months, how many times did you visit the emergency room for your asthma without being admitted to the hospital? 0 0 0   In the past 12 months, how many times were you hospitalized overnight because of your asthma? 0 0 0     Await response on ACT test and for patient to schedule visit.    I see patient is actually requesting levalbuterol, 15 grams with 11 refills were sent to requesting pharmacy in September.   I called [facility] pharmacy to see if he has refills available, he does.    [employer] message routed to patient advising of this.    Sandra Chapa RN  [facility]

## 2021-03-09 NOTE — TELEPHONE ENCOUNTER
Type of outreach:    Phone, left message for patient to call back.        Patient is due/failing the following:   Physical  Follow up visit for diabetes, htn, lipid, asthma  ACT  Labs-fasting cholesterol, a1c, hep c screen,    Eye exam-recommended yearly for diabetics

## 2021-04-18 ENCOUNTER — HEALTH MAINTENANCE LETTER (OUTPATIENT)
Age: 36
End: 2021-04-18

## 2021-06-09 ENCOUNTER — OFFICE VISIT (OUTPATIENT)
Dept: FAMILY MEDICINE | Facility: CLINIC | Age: 36
End: 2021-06-09
Payer: COMMERCIAL

## 2021-06-09 VITALS
HEART RATE: 73 BPM | SYSTOLIC BLOOD PRESSURE: 138 MMHG | RESPIRATION RATE: 14 BRPM | WEIGHT: 209.2 LBS | HEIGHT: 70 IN | BODY MASS INDEX: 29.95 KG/M2 | DIASTOLIC BLOOD PRESSURE: 92 MMHG | OXYGEN SATURATION: 97 % | TEMPERATURE: 96.8 F

## 2021-06-09 DIAGNOSIS — Z11.59 NEED FOR HEPATITIS C SCREENING TEST: ICD-10-CM

## 2021-06-09 DIAGNOSIS — J45.40 MODERATE PERSISTENT ASTHMA WITHOUT COMPLICATION: ICD-10-CM

## 2021-06-09 DIAGNOSIS — Z13.220 SCREENING FOR HYPERLIPIDEMIA: ICD-10-CM

## 2021-06-09 DIAGNOSIS — E11.9 TYPE 2 DIABETES MELLITUS WITHOUT COMPLICATION, WITHOUT LONG-TERM CURRENT USE OF INSULIN (H): Primary | ICD-10-CM

## 2021-06-09 DIAGNOSIS — E78.5 HYPERLIPIDEMIA LDL GOAL <100: ICD-10-CM

## 2021-06-09 LAB
ANION GAP SERPL CALCULATED.3IONS-SCNC: 5 MMOL/L (ref 3–14)
BUN SERPL-MCNC: 8 MG/DL (ref 7–30)
CALCIUM SERPL-MCNC: 9.2 MG/DL (ref 8.5–10.1)
CHLORIDE SERPL-SCNC: 102 MMOL/L (ref 94–109)
CHOLEST SERPL-MCNC: 307 MG/DL
CO2 SERPL-SCNC: 28 MMOL/L (ref 20–32)
CREAT SERPL-MCNC: 0.79 MG/DL (ref 0.66–1.25)
GFR SERPL CREATININE-BSD FRML MDRD: >90 ML/MIN/{1.73_M2}
GLUCOSE SERPL-MCNC: 191 MG/DL (ref 70–99)
HBA1C MFR BLD: 10.4 % (ref 0–5.6)
HDLC SERPL-MCNC: 35 MG/DL
LDLC SERPL CALC-MCNC: 206 MG/DL
NONHDLC SERPL-MCNC: 272 MG/DL
POTASSIUM SERPL-SCNC: 4 MMOL/L (ref 3.4–5.3)
SODIUM SERPL-SCNC: 135 MMOL/L (ref 133–144)
TRIGL SERPL-MCNC: 332 MG/DL

## 2021-06-09 PROCEDURE — 80048 BASIC METABOLIC PNL TOTAL CA: CPT | Performed by: PHYSICIAN ASSISTANT

## 2021-06-09 PROCEDURE — 99214 OFFICE O/P EST MOD 30 MIN: CPT | Performed by: PHYSICIAN ASSISTANT

## 2021-06-09 PROCEDURE — 83036 HEMOGLOBIN GLYCOSYLATED A1C: CPT | Performed by: PHYSICIAN ASSISTANT

## 2021-06-09 PROCEDURE — 86803 HEPATITIS C AB TEST: CPT | Performed by: PHYSICIAN ASSISTANT

## 2021-06-09 PROCEDURE — 36415 COLL VENOUS BLD VENIPUNCTURE: CPT | Performed by: PHYSICIAN ASSISTANT

## 2021-06-09 PROCEDURE — 80061 LIPID PANEL: CPT | Performed by: PHYSICIAN ASSISTANT

## 2021-06-09 RX ORDER — LEVALBUTEROL TARTRATE 45 UG/1
1-2 AEROSOL, METERED ORAL EVERY 4 HOURS PRN
Qty: 15 G | Refills: 11 | Status: SHIPPED | OUTPATIENT
Start: 2021-06-09 | End: 2021-10-22

## 2021-06-09 RX ORDER — DULAGLUTIDE 1.5 MG/.5ML
1.5 INJECTION, SOLUTION SUBCUTANEOUS
Qty: 7 ML | Refills: 1 | Status: SHIPPED | OUTPATIENT
Start: 2021-06-09 | End: 2021-10-22

## 2021-06-09 RX ORDER — ATORVASTATIN CALCIUM 20 MG/1
20 TABLET, FILM COATED ORAL DAILY
Qty: 90 TABLET | Refills: 3 | Status: SHIPPED | OUTPATIENT
Start: 2021-06-09 | End: 2022-05-27

## 2021-06-09 ASSESSMENT — MIFFLIN-ST. JEOR: SCORE: 1878.3

## 2021-06-09 NOTE — PROGRESS NOTES
"    Assessment & Plan   Problem List Items Addressed This Visit        Respiratory    Moderate persistent asthma    Relevant Medications    levalbuterol (XOPENEX HFA) 45 MCG/ACT inhaler       Endocrine    Type 2 diabetes mellitus (H) - Primary    Relevant Medications    dulaglutide (TRULICITY) 1.5 MG/0.5ML pen    Other Relevant Orders    HEMOGLOBIN A1C (Completed)    BASIC METABOLIC PANEL (Completed)    Hyperlipidemia LDL goal <100    Relevant Medications    atorvastatin (LIPITOR) 20 MG tablet      Other Visit Diagnoses     Need for hepatitis C screening test        Relevant Orders    Hepatitis C Screen Reflex to HCV RNA Quant and Genotype (Completed)    Screening for hyperlipidemia        Relevant Orders    Lipid panel reflex to direct LDL Fasting (Completed)         Doing well with medications. No side effects. Saw optho in the last year for an eye check. A1C is elevated and essentially unchanged a year ago. Will increase atorvastatin based on elevated lipids. Will follow up with his primary to discuss A1C and DM2 management going forward.    Complete history and physical exam as below. AF with normal VS except for elevated bp, which they will monitor at home and contact us if >140/90mmHg greater than 50% of the time.    DDx and Dx discussed with and explained to the pt to their satisfaction.  All questions were answered at this time. Pt expressed understanding of and agreement with this dx, tx, and plan. No further workup warranted and standard medication warnings given. I have given the patient a list of pertinent indications for re-evaluation. Will go to the Emergency Department if symptoms worsen or new concerning symptoms arise. Patient left in no apparent distress.      BMI:   Estimated body mass index is 30.12 kg/m  as calculated from the following:    Height as of this encounter: 1.775 m (5' 9.88\").    Weight as of this encounter: 94.9 kg (209 lb 3.2 oz).   Weight management plan: Discussed healthy diet " and exercise guidelines    See Patient Instructions    Return in about 6 months (around 12/9/2021).    COLLEEN Diaz  [facility] MASS[place] Kinsey is a 36 year old who presents for the following health issues     HPI     Diabetes Follow-up    How often are you checking your blood sugar? Two times daily  Blood sugar testing frequency justification:  N/A  What time of day are you checking your blood sugars (select all that apply)?  Before meals and After meals  Have you had any blood sugars above 200?  No  Have you had any blood sugars below 70?  No    What symptoms do you notice when your blood sugar is low?  Not applicable    What concerns do you have today about your diabetes? None     Do you have any of these symptoms? (Select all that apply)  No numbness or tingling in feet.  No redness, sores or blisters on feet.  No complaints of excessive thirst.  No reports of blurry vision.  No significant changes to weight.    Have you had a diabetic eye exam in the last 12 months? Yes- Date of last eye exam: 11/2020,  Location: [facility]        BP Readings from Last 2 Encounters:   06/09/21 (!) 138/92   09/10/20 (!) 146/86     Hemoglobin A1C (%)   Date Value   06/09/2021 10.4 (H)   06/25/2020 10.5 (H)     LDL Cholesterol Calculated (mg/dL)   Date Value   06/09/2021 206 (H)   09/10/2020 167 (H)       How many servings of fruits and vegetables do you eat daily?  2-3    On average, how many sweetened beverages do you drink each day (Examples: soda, juice, sweet tea, etc.  Do NOT count diet or artificially sweetened beverages)?   0    How many days per week do you exercise enough to make your heart beat faster? 5    How many minutes a day do you exercise enough to make your heart beat faster? 60 or more    How many days per week do you miss taking your medication? 0        Review of Systems   Constitutional, HEENT, cardiovascular, pulmonary, gi and gu systems are negative, except as  "otherwise noted.      Objective    BP (!) 138/92   Pulse 73   Temp 96.8  F (36  C) (Tympanic)   Resp 14   Ht 1.775 m (5' 9.88\")   Wt 94.9 kg (209 lb 3.2 oz)   SpO2 97%   BMI 30.12 kg/m    Body mass index is 30.12 kg/m .  Physical Exam  Vitals signs and nursing note reviewed.   Constitutional:       General: He is not in acute distress.     Appearance: He is not ill-appearing or diaphoretic.   HENT:      Head: Normocephalic and atraumatic.      Mouth/Throat:      Mouth: Mucous membranes are moist.   Eyes:      Conjunctiva/sclera: Conjunctivae normal.   Cardiovascular:      Rate and Rhythm: Normal rate and regular rhythm.      Heart sounds: Normal heart sounds. No murmur. No friction rub. No gallop.    Pulmonary:      Effort: Pulmonary effort is normal. No respiratory distress.      Breath sounds: Normal breath sounds. No stridor. No wheezing, rhonchi or rales.   Abdominal:      General: Bowel sounds are normal. There is no distension.      Palpations: Abdomen is soft. There is no mass.      Tenderness: There is no abdominal tenderness. There is no guarding or rebound.      Hernia: No hernia is present.   Skin:     General: Skin is warm and dry.   Neurological:      General: No focal deficit present.      Mental Status: He is alert. Mental status is at baseline.   Psychiatric:         Mood and Affect: Mood normal.         Behavior: Behavior normal.          Results for orders placed or performed in visit on 06/09/21   HEMOGLOBIN A1C     Status: Abnormal   Result Value Ref Range    Hemoglobin A1C 10.4 (H) 0 - 5.6 %   Lipid panel reflex to direct LDL Fasting     Status: Abnormal   Result Value Ref Range    Cholesterol 307 (H) <200 mg/dL    Triglycerides 332 (H) <150 mg/dL    HDL Cholesterol 35 (L) >39 mg/dL    LDL Cholesterol Calculated 206 (H) <100 mg/dL    Non HDL Cholesterol 272 (H) <130 mg/dL   BASIC METABOLIC PANEL     Status: Abnormal   Result Value Ref Range    Sodium 135 133 - 144 mmol/L    Potassium 4.0 " 3.4 - 5.3 mmol/L    Chloride 102 94 - 109 mmol/L    Carbon Dioxide 28 20 - 32 mmol/L    Anion Gap 5 3 - 14 mmol/L    Glucose 191 (H) 70 - 99 mg/dL    Urea Nitrogen 8 7 - 30 mg/dL    Creatinine 0.79 0.66 - 1.25 mg/dL    GFR Estimate >90 >60 mL/min/[1.73_m2]    GFR Estimate If Black >90 >60 mL/min/[1.73_m2]    Calcium 9.2 8.5 - 10.1 mg/dL

## 2021-06-09 NOTE — PATIENT INSTRUCTIONS
Tavon Kinsey,    Thank you for allowing [facility] to manage your care.    I ordered some lab work, please go to the laboratory to get your studies.    I sent your prescriptions to your pharmacy.    Please allow 1-2 business days for our office to contact you in regards to your laboratory/radiological studies.  If not done so, I encourage you to login into [employer] ([email or web address] to review your results as well.     If you have any questions or concerns, please feel free to call us at [phone]    Sincerely,    Arnol Tello PA-C    Did you know?      You can schedule a video visit for follow-up appointments as well as future appointments for certain conditions.  Please see the below link.     [email or web address]    If you have not already done so,  I encourage you to sign up for [employer] ([email or web address]).  This will allow you to review your results, securely communicate with a provider, and schedule virtual visits as well.

## 2021-06-10 LAB — HCV AB SERPL QL IA: NONREACTIVE

## 2021-06-10 ASSESSMENT — ASTHMA QUESTIONNAIRES: ACT_TOTALSCORE: 18

## 2021-08-30 ENCOUNTER — TRANSFERRED RECORDS (OUTPATIENT)
Dept: MULTI SPECIALTY CLINIC | Facility: CLINIC | Age: 36
End: 2021-08-30
Payer: COMMERCIAL

## 2021-08-30 LAB — RETINOPATHY: NORMAL

## 2021-09-30 ENCOUNTER — MYC MEDICAL ADVICE (OUTPATIENT)
Dept: FAMILY MEDICINE | Facility: CLINIC | Age: 36
End: 2021-09-30

## 2021-09-30 DIAGNOSIS — I10 BENIGN ESSENTIAL HYPERTENSION: ICD-10-CM

## 2021-09-30 RX ORDER — AMLODIPINE BESYLATE 2.5 MG/1
2.5 TABLET ORAL DAILY
Qty: 30 TABLET | Refills: 0 | Status: SHIPPED | OUTPATIENT
Start: 2021-09-30 | End: 2021-10-22

## 2021-09-30 RX ORDER — HYDROCHLOROTHIAZIDE 25 MG/1
25 TABLET ORAL DAILY
Qty: 30 TABLET | Refills: 0 | Status: SHIPPED | OUTPATIENT
Start: 2021-09-30 | End: 2021-10-22

## 2021-10-02 ENCOUNTER — HEALTH MAINTENANCE LETTER (OUTPATIENT)
Age: 36
End: 2021-10-02

## 2021-10-22 ENCOUNTER — OFFICE VISIT (OUTPATIENT)
Dept: FAMILY MEDICINE | Facility: CLINIC | Age: 36
End: 2021-10-22
Payer: COMMERCIAL

## 2021-10-22 VITALS
SYSTOLIC BLOOD PRESSURE: 132 MMHG | DIASTOLIC BLOOD PRESSURE: 87 MMHG | RESPIRATION RATE: 20 BRPM | TEMPERATURE: 97.8 F | BODY MASS INDEX: 30.55 KG/M2 | HEART RATE: 86 BPM | OXYGEN SATURATION: 98 % | WEIGHT: 212.2 LBS

## 2021-10-22 DIAGNOSIS — I10 BENIGN ESSENTIAL HYPERTENSION: ICD-10-CM

## 2021-10-22 DIAGNOSIS — J45.40 MODERATE PERSISTENT ASTHMA WITHOUT COMPLICATION: ICD-10-CM

## 2021-10-22 DIAGNOSIS — Z13.220 SCREENING FOR HYPERLIPIDEMIA: Primary | ICD-10-CM

## 2021-10-22 DIAGNOSIS — E11.9 TYPE 2 DIABETES MELLITUS WITHOUT COMPLICATION, WITHOUT LONG-TERM CURRENT USE OF INSULIN (H): ICD-10-CM

## 2021-10-22 LAB
CHOLEST SERPL-MCNC: 181 MG/DL
CREAT UR-MCNC: 44 MG/DL
FASTING STATUS PATIENT QL REPORTED: YES
HBA1C MFR BLD: 10.2 % (ref 0–5.6)
HDLC SERPL-MCNC: 42 MG/DL
LDLC SERPL CALC-MCNC: 74 MG/DL
MICROALBUMIN UR-MCNC: 7 MG/L
MICROALBUMIN/CREAT UR: 15.91 MG/G CR (ref 0–17)
NONHDLC SERPL-MCNC: 139 MG/DL
TRIGL SERPL-MCNC: 327 MG/DL

## 2021-10-22 PROCEDURE — 83036 HEMOGLOBIN GLYCOSYLATED A1C: CPT | Performed by: NURSE PRACTITIONER

## 2021-10-22 PROCEDURE — 80061 LIPID PANEL: CPT | Performed by: NURSE PRACTITIONER

## 2021-10-22 PROCEDURE — 99214 OFFICE O/P EST MOD 30 MIN: CPT | Mod: 25 | Performed by: NURSE PRACTITIONER

## 2021-10-22 PROCEDURE — 90686 IIV4 VACC NO PRSV 0.5 ML IM: CPT | Performed by: NURSE PRACTITIONER

## 2021-10-22 PROCEDURE — 36415 COLL VENOUS BLD VENIPUNCTURE: CPT | Performed by: NURSE PRACTITIONER

## 2021-10-22 PROCEDURE — 82043 UR ALBUMIN QUANTITATIVE: CPT | Performed by: NURSE PRACTITIONER

## 2021-10-22 PROCEDURE — 90471 IMMUNIZATION ADMIN: CPT | Performed by: NURSE PRACTITIONER

## 2021-10-22 RX ORDER — HYDROCHLOROTHIAZIDE 25 MG/1
25 TABLET ORAL DAILY
Qty: 90 TABLET | Refills: 1 | Status: SHIPPED | OUTPATIENT
Start: 2021-10-22 | End: 2022-05-25

## 2021-10-22 RX ORDER — DULAGLUTIDE 1.5 MG/.5ML
1.5 INJECTION, SOLUTION SUBCUTANEOUS
Qty: 7 ML | Refills: 1 | Status: SHIPPED | OUTPATIENT
Start: 2021-10-22 | End: 2022-01-12

## 2021-10-22 RX ORDER — AMLODIPINE BESYLATE 2.5 MG/1
2.5 TABLET ORAL DAILY
Qty: 90 TABLET | Refills: 1 | Status: SHIPPED | OUTPATIENT
Start: 2021-10-22 | End: 2022-05-25

## 2021-10-22 RX ORDER — LEVALBUTEROL TARTRATE 45 UG/1
1-2 AEROSOL, METERED ORAL EVERY 4 HOURS PRN
Qty: 45 G | Refills: 3 | Status: SHIPPED | OUTPATIENT
Start: 2021-10-22 | End: 2022-05-27

## 2021-10-22 ASSESSMENT — ASTHMA QUESTIONNAIRES
QUESTION_3 LAST FOUR WEEKS HOW OFTEN DID YOUR ASTHMA SYMPTOMS (WHEEZING, COUGHING, SHORTNESS OF BREATH, CHEST TIGHTNESS OR PAIN) WAKE YOU UP AT NIGHT OR EARLIER THAN USUAL IN THE MORNING: NOT AT ALL
ACT_TOTALSCORE: 18
QUESTION_5 LAST FOUR WEEKS HOW WOULD YOU RATE YOUR ASTHMA CONTROL: WELL CONTROLLED
QUESTION_4 LAST FOUR WEEKS HOW OFTEN HAVE YOU USED YOUR RESCUE INHALER OR NEBULIZER MEDICATION (SUCH AS ALBUTEROL): ONE OR TWO TIMES PER DAY
QUESTION_2 LAST FOUR WEEKS HOW OFTEN HAVE YOU HAD SHORTNESS OF BREATH: ONCE A DAY
QUESTION_1 LAST FOUR WEEKS HOW MUCH OF THE TIME DID YOUR ASTHMA KEEP YOU FROM GETTING AS MUCH DONE AT WORK, SCHOOL OR AT HOME: NONE OF THE TIME

## 2021-10-22 ASSESSMENT — PAIN SCALES - GENERAL: PAINLEVEL: NO PAIN (0)

## 2021-10-22 NOTE — PATIENT INSTRUCTIONS
Patient Education     Controlling Allergens: In the Home   Even a clean home can be full of allergens. So take a moment to see what you can do to cut down on allergens in each room of your home.    Buy an air purifier with a HEPA filter. Look online or in consumer magazines for recommendations. Don't overuse vaporizers and humidifiers. They encourage mold and dust-mite growth.    Use shades or vertical blinds instead of horizontal blinds, which collect dust. Replace drapes with curtains that can be washed regularly.    Enclose mattresses, box springs, and pillows in allergy-proof casings. Bedding (sheets, mattress covers, pillowcases) should be washed weekly in hot water. Use washable blankets and quilts. Don't use feather pillows, down comforters, and wool blankets.    Prevent dust-catching clutter. Have enclosed places to keep books, toys, and clothes. Keep closet doors closed.    Use washable throw rugs wherever possible. Or have bare floors.    Put filters over forced-air heating vents. Change the filters regularly.    Keep your car clean. Vacuum the seats and carpets regularly.    If you have air conditioning, use it instead of opening the windows in your car and home. Air conditioning filters and dehumidifies air. It reduces pollens getting into your car and home from open windows.    Keep rain gutters clean. Remove leaves and debris that can grow mold.    Check stored food for spoilage and mold growth. Clean up spills right away.    Don't let wet clothing sit and grow mold. And don't hang clothes outside to dry where they can collect airborne pollen. Dry clothing right away in a clothes dryer that's vented to the outside.    Install a fan to keep the bathroom well-ventilated.    Keep pets out of your bedroom. Keep them off upholstered furniture.    Control pests such as cockroaches and mice. Close up areas of the home where pests can enter. Don't leave open food out in the kitchen. Use bait or traps to kill  pests. Get professional pest control help if the problem doesn't go away.    Try to stay away from cigarette smoke and perfume. They are not allergens. But they can make your allergy symptoms worse. They irritate your eyes, nose, throat, and lungs.     Don't do yard work or pull weeds. These and other outdoor activities increase your exposure to pollen. If that s not possible, wear a filter mask. When you re done, bathe, wash your hair, and change your clothes.  [employer] last reviewed this educational content on 4/1/2019 2000-2021 [employer]. All rights reserved. This information is not intended as a substitute for professional medical care. Always follow your healthcare professional's instructions.           Patient Education     Controlling Allergens: Dust Mites in the Bedroom    Many people with asthma are allergic to dust mites. Dust mites are tiny bugs that live in warm, damp places. They are too small to see. But they live in mattresses, pillows, upholstered furniture, and house dust. Dust mite allergy can cause asthma flare-ups. If you have this allergy, there are many steps you can take to control dust mites at home.  Take these steps to control dust mites in your bed and bedroom:  1. Keep all clothing in a closet, with the door shut.  2. Make sure the room is not too humid. It should be below 50% humidity.  3. Choose wood, leather, or vinyl for furniture instead of upholstery.  4. Wash all bedding, pillows, and stuffed toys in hot water (130 F) every week. Dry them in a hot dryer. Remove any items that can't be washed.  5. Use special covers on pillows, mattresses, and box springs. These covers are made for people with allergies.  6. If you can, replace carpeting with tile or hardwood jacqueline. Use washable throw rugs. Or don't use rugs at all.  7. Dust furniture with a damp cloth at least once a week.  8. Use an air conditioner or a dehumidifier to reduce humidity and filter the air.  Clean the filter often.  9. Use pull-down shades or vertical window blinds that can be easily cleaned. Use these instead of curtains or drapes.  10. Use filters over heater vents.  [employer] last reviewed this educational content on 4/1/2019 2000-2021 [employer]. All rights reserved. This information is not intended as a substitute for professional medical care. Always follow your healthcare professional's instructions.

## 2021-10-22 NOTE — PROGRESS NOTES
Assessment & Plan     Screening for hyperlipidemia    - Lipid panel reflex to direct LDL Fasting; Future  - Lipid panel reflex to direct LDL Fasting    Benign essential hypertension    - Albumin Random Urine Quantitative with Creat Ratio; Future  - amLODIPine (NORVASC) 2.5 MG tablet; Take 1 tablet (2.5 mg) by mouth daily  - hydrochlorothiazide (HYDRODIURIL) 25 MG tablet; Take 1 tablet (25 mg) by mouth daily  - Albumin Random Urine Quantitative with Creat Ratio    Type 2 diabetes mellitus without complication, without long-term current use of insulin (H)    - HEMOGLOBIN A1C; Future  - Albumin Random Urine Quantitative with Creat Ratio; Future  - OPTOMETRY REFERRAL; Future  - dulaglutide (TRULICITY) 1.5 MG/0.5ML pen; Inject 1.5 mg Subcutaneous every 7 days  - Albumin Random Urine Quantitative with Creat Ratio  - HEMOGLOBIN A1C    Moderate persistent asthma without complication    - levalbuterol (XOPENEX HFA) 45 MCG/ACT inhaler; Inhale 1-2 puffs into the lungs every 4 hours as needed for shortness of breath / dyspnea  - fluticasone-salmeterol (ADVAIR) 100-50 MCG/DOSE inhaler; Inhale 1 puff into the lungs every 12 hours      20 minutes spent on the date of the encounter doing chart review, history and exam, documentation and further activities per the note    See Patient Instructions: follow up as needed. DM check in 6 months    Return in about 6 months (around 4/22/2022), or if symptoms worsen or fail to improve.    MORENO Parson  [facility] MASSIMO Kinsey is a 36 year old who presents for the following health issues     History of Present Illness     Asthma:  He presents for follow up of asthma.  He has no cough, some wheezing, and no shortness of breath. He is using a relief medication daily. He does not miss any doses of his controller medication throughout the week.Patient is aware of the following triggers: cold air and humidity. The patient has not had a visit to the  Emergency Room, Urgent Care or Hospital due to asthma since the last clinic visit.     Diabetes:   He presents for follow up of diabetes.  He is checking home blood glucose a few times a week. He checks blood glucose before meals.  Blood glucose is never over 200 and never under 70. He is aware of hypoglycemia symptoms including shakiness and weakness. He has no concerns regarding his diabetes at this time.  He is not experiencing numbness or burning in feet, excessive thirst, blurry vision, weight changes or redness, sores or blisters on feet. The patient has had a diabetic eye exam in the last 12 months.         He eats 2-3 servings of fruits and vegetables daily.He consumes 0 sweetened beverage(s) daily.He exercises with enough effort to increase his heart rate 30 to 60 minutes per day.  He exercises with enough effort to increase his heart rate 3 or less days per week.   He is taking medications regularly.     Patient reports he uses his rescue inhaler once during the day and before bed. Always works for him with one puff. Has not used a steroid inhaler for awhile, but would like to have one for winter incase his asthma worsens. He checks his blood sugars once daily either in the morning before breakfast or before dinner. Denies highs or lows/ symptoms. Requesting 3 month refills. Baby daughter is 8 months. Doing good, sleeping well. Pt reports mental health is doing good. Work is busy.    BP Readings from Last 2 Encounters:   10/22/21 132/87   06/09/21 (!) 138/92     Hemoglobin A1C (%)   Date Value   06/09/2021 10.4 (H)   06/25/2020 10.5 (H)     LDL Cholesterol Calculated (mg/dL)   Date Value   06/09/2021 206 (H)   09/10/2020 167 (H)     Asthma Follow-Up    Was ACT completed today?    Yes    ACT Total Scores 10/22/2021   ACT TOTAL SCORE -   ASTHMA ER VISITS -   ASTHMA HOSPITALIZATIONS -   ACT TOTAL SCORE (Goal Greater than or Equal to 20) 18   In the past 12 months, how many times did you visit the emergency  room for your asthma without being admitted to the hospital? 0   In the past 12 months, how many times were you hospitalized overnight because of your asthma? 0       Review of Systems   Constitutional, HEENT, cardiovascular, pulmonary, GI, , musculoskeletal, neuro, skin, endocrine and psych systems are negative, except as otherwise noted.      Objective    /87 (BP Location: Left arm, Patient Position: Sitting, Cuff Size: Adult Large)   Pulse 86   Temp 97.8  F (36.6  C) (Tympanic)   Resp 20   Wt 96.3 kg (212 lb 3.2 oz)   SpO2 98%   BMI 30.55 kg/m    Body mass index is 30.55 kg/m .  Physical Exam   GENERAL: healthy, alert and no distress  RESP: lungs clear to auscultation - no rales, rhonchi or wheezes  CV: regular rate and rhythm, normal S1 S2, no S3 or S4, no murmur, click or rub, no peripheral edema and peripheral pulses strong  MS: no gross musculoskeletal defects noted, no edema  PSYCH: mentation appears normal, affect normal/bright  Diabetic foot exam: normal DP and PT pulses, no trophic changes or ulcerative lesions and normal sensory exam    See orders

## 2021-10-23 ASSESSMENT — ASTHMA QUESTIONNAIRES: ACT_TOTALSCORE: 18

## 2021-10-26 NOTE — RESULT ENCOUNTER NOTE
Rafy Kinsey,    Thank you for your recent office visit.    Here are your recent results.  A1C slightly improved, keep working on this. Triglycerides are still pretty high, are you taking a daily fish oil supplement for this?    Feel free to contact me via [employer] or call the clinic at [phone].    Sincerely,    Danyell Banuelos, MARLY, FNP-BC     [facility]

## 2021-12-12 ENCOUNTER — E-VISIT (OUTPATIENT)
Dept: URGENT CARE | Facility: CLINIC | Age: 36
End: 2021-12-12
Payer: COMMERCIAL

## 2021-12-12 ENCOUNTER — OFFICE VISIT (OUTPATIENT)
Dept: URGENT CARE | Facility: URGENT CARE | Age: 36
End: 2021-12-12
Payer: COMMERCIAL

## 2021-12-12 VITALS
HEART RATE: 103 BPM | OXYGEN SATURATION: 99 % | TEMPERATURE: 98.9 F | WEIGHT: 210 LBS | DIASTOLIC BLOOD PRESSURE: 83 MMHG | SYSTOLIC BLOOD PRESSURE: 140 MMHG | BODY MASS INDEX: 30.23 KG/M2

## 2021-12-12 DIAGNOSIS — R39.89 URINARY PROBLEM: Primary | ICD-10-CM

## 2021-12-12 DIAGNOSIS — R30.0 DIFFICULT OR PAINFUL URINATION: Primary | ICD-10-CM

## 2021-12-12 DIAGNOSIS — R10.30 LOWER ABDOMINAL PAIN: ICD-10-CM

## 2021-12-12 LAB
ALBUMIN SERPL-MCNC: 3.9 G/DL (ref 3.4–5)
ALBUMIN UR-MCNC: NEGATIVE MG/DL
ALP SERPL-CCNC: 170 U/L (ref 40–150)
ALT SERPL W P-5'-P-CCNC: 101 U/L
ANION GAP SERPL CALCULATED.3IONS-SCNC: 6 MMOL/L (ref 3–14)
APPEARANCE UR: CLEAR
AST SERPL W P-5'-P-CCNC: 50 U/L (ref 0–45)
BASOPHILS # BLD AUTO: 0 10E3/UL (ref 0–0.2)
BASOPHILS NFR BLD AUTO: 0 %
BILIRUB SERPL-MCNC: 1 MG/DL (ref 0.2–1.3)
BILIRUB UR QL STRIP: NEGATIVE
BUN SERPL-MCNC: 10 MG/DL (ref 7–30)
CALCIUM SERPL-MCNC: 10 MG/DL (ref 8.5–10.1)
CHLORIDE BLD-SCNC: 99 MMOL/L (ref 94–109)
CO2 SERPL-SCNC: 32 MMOL/L (ref 20–32)
COLOR UR AUTO: YELLOW
CREAT SERPL-MCNC: 0.6 MG/DL (ref 0.66–1.25)
EOSINOPHIL # BLD AUTO: 0.2 10E3/UL (ref 0–0.7)
EOSINOPHIL NFR BLD AUTO: 2 %
ERYTHROCYTE [DISTWIDTH] IN BLOOD BY AUTOMATED COUNT: 13.3 % (ref 10–15)
GFR SERPL CREATININE-BSD FRML MDRD: >90 ML/MIN/1.73M2
GLUCOSE BLD-MCNC: 338 MG/DL (ref 70–99)
GLUCOSE UR STRIP-MCNC: >=1000 MG/DL
HCT VFR BLD AUTO: 46.7 % (ref 40–53)
HGB BLD-MCNC: 16 G/DL (ref 13.3–17.7)
HGB UR QL STRIP: NEGATIVE
KETONES UR STRIP-MCNC: ABNORMAL MG/DL
LEUKOCYTE ESTERASE UR QL STRIP: NEGATIVE
LYMPHOCYTES # BLD AUTO: 1.4 10E3/UL (ref 0.8–5.3)
LYMPHOCYTES NFR BLD AUTO: 15 %
MCH RBC QN AUTO: 28.5 PG (ref 26.5–33)
MCHC RBC AUTO-ENTMCNC: 34.3 G/DL (ref 31.5–36.5)
MCV RBC AUTO: 83 FL (ref 78–100)
MONOCYTES # BLD AUTO: 0.7 10E3/UL (ref 0–1.3)
MONOCYTES NFR BLD AUTO: 8 %
NEUTROPHILS # BLD AUTO: 7.1 10E3/UL (ref 1.6–8.3)
NEUTROPHILS NFR BLD AUTO: 76 %
NITRATE UR QL: NEGATIVE
PH UR STRIP: 5.5 [PH] (ref 5–7)
PLATELET # BLD AUTO: 212 10E3/UL (ref 150–450)
POTASSIUM BLD-SCNC: 4.3 MMOL/L (ref 3.4–5.3)
PROT SERPL-MCNC: 7.9 G/DL (ref 6.8–8.8)
RBC # BLD AUTO: 5.61 10E6/UL (ref 4.4–5.9)
SODIUM SERPL-SCNC: 137 MMOL/L (ref 133–144)
SP GR UR STRIP: 1.02 (ref 1–1.03)
UROBILINOGEN UR STRIP-ACNC: 0.2 E.U./DL
WBC # BLD AUTO: 9.4 10E3/UL (ref 4–11)

## 2021-12-12 PROCEDURE — 80053 COMPREHEN METABOLIC PANEL: CPT | Performed by: FAMILY MEDICINE

## 2021-12-12 PROCEDURE — 85025 COMPLETE CBC W/AUTO DIFF WBC: CPT | Performed by: FAMILY MEDICINE

## 2021-12-12 PROCEDURE — 36415 COLL VENOUS BLD VENIPUNCTURE: CPT | Performed by: FAMILY MEDICINE

## 2021-12-12 PROCEDURE — 81003 URINALYSIS AUTO W/O SCOPE: CPT

## 2021-12-12 PROCEDURE — 99207 PR NON-BILLABLE SERV PER CHARTING: CPT | Performed by: NURSE PRACTITIONER

## 2021-12-12 PROCEDURE — 99214 OFFICE O/P EST MOD 30 MIN: CPT | Performed by: FAMILY MEDICINE

## 2021-12-12 PROCEDURE — 87086 URINE CULTURE/COLONY COUNT: CPT | Performed by: FAMILY MEDICINE

## 2021-12-12 NOTE — PATIENT INSTRUCTIONS
Dear Tio Merino,    We are sorry you are not feeling well. Based on the responses you provided, it is recommended that you be seen in-person in urgent care so we can better evaluate your symptoms. Please click here to find the nearest urgent care location to you.   You will not be charged for this Visit. Thank you for trusting us with your care.    Male UTIs are pretty uncommon. I would advise in person urgent care visit for lab work so that we can properly diagnose and treat this. Differentials include UTI, kidney stone, prostatitis, urethritis.    Cadence Liu, NP      Dysuria with Uncertain Cause (Adult)    The urethra is the tube that allows urine to pass out of the body. In a woman, the urethra is the opening above the vagina. In men, the urethra is the opening on the tip of the penis. Dysuria is the feeling of pain or burning in the urethra when passing urine.   Dysuria can be caused by anything that irritates or inflames the urethra. An infection or chemical irritation can cause this reaction. A bladder infection is the most common cause of dysuria in adults. A urine test can diagnose this. A bladder infection needs antibiotic treatment.   Soaps, lotions, colognes, and feminine hygiene products can cause dysuria. So can birth control jellies, creams, and foams. It will go away 1 to 3 days after using these irritants.   Sexually transmitted infections (STIs) such as chlamydia or gonorrhea can cause dysuria. Your healthcare provider may take a culture sample. Your provider may start you on antibiotic medicine before the culture test returns.   In women who have gone through menopause, dysuria can be from dryness in the lining of the urethra. This can be treated with hormones. Dysuria becomes long-term (chronic) when it lasts for weeks or months. You may need to see a specialist (urologist) to diagnose and treat chronic dysuria.   Home care  These home care tips may help:    Don't use any chemicals  or products that you think may be causing your symptoms.    If you were given a prescription medicine, take as directed. Take it until it is all used up.    If a culture was taken, don't have sex until you have been told that it is negative. A negative culture means you don't have an infection. Then follow your healthcare provider's advice to treat your condition.  If a culture was done and it is positive:     Both you and your sexual partner may need to be treated. This is true even if your partner has no symptoms.    Contact your healthcare provider or go to an urgent care clinic or the public health department to be looked at and treated.    Don't have sex until both you and your partner have finished all antibiotics and your healthcare provider says you are no longer contagious.    Learn about and use safe sex practices. The safest sex is with a partner who has tested negative and only has sex with you. Condoms can prevent STIs from spreading, but they aren't a guarantee.  Follow-up care  Follow up with your healthcare provider, or as advised. If a culture was taken, you may call as directed for the results. If you have an STI, follow up with your provider or the public health department for a complete STI screening, including HIV testing. For more information, contact CDC-INFO at 742-328-3654.   When to get medical advice  Call your healthcare provider right away if any of these occur:    You aren't better after 3 days of treatment    Fever of 100.4 F (38 C) or higher, or as directed by your provider    Back or belly pain that gets worse    You can't urinate because of pain    New discharge from the urethra, vagina, or penis    Painful sores on the penis    Rash or joint pain    Painful lumps (lymph nodes) in the groin    Testicle pain or swelling of the scrotum  [employer] last reviewed this educational content on 10/1/2019    0650-5340 [employer]. All rights reserved. This information is not  intended as a substitute for professional medical care. Always follow your healthcare professional's instructions.

## 2021-12-12 NOTE — PROGRESS NOTES
SUBJECTIVE:   Tio Merino is a 36 year old male who  presents today for a possible UTI.     Symptoms present for the past 3-4 days - increase in urinary symptoms at night, needs to get up to go use the bathroom - this is a new symptom.  Denies any dysuria symptoms.  Denies any rectal pain.  No concerns for STD, same partner.    Patient states that abdominal pain started today while walking.   Pain is more in lower abdominal pain, worse with laying down.  Still has appendix.    Had BM today, normal.  No fever    Had DM, not well controlled.  Last HgbA1C 10.2 in October, currently on Trulicity only.  Glucose self checks in 150-200's.    Past Medical History:   Diagnosis Date     Asthma      Diabetes (H) 3/20/2014     Hypertension 4/12/2017     Current Outpatient Medications   Medication Sig Dispense Refill     amLODIPine (NORVASC) 2.5 MG tablet Take 1 tablet (2.5 mg) by mouth daily 90 tablet 1     atorvastatin (LIPITOR) 20 MG tablet Take 1 tablet (20 mg) by mouth daily 90 tablet 3     dulaglutide (TRULICITY) 1.5 MG/0.5ML pen Inject 1.5 mg Subcutaneous every 7 days 7 mL 1     fluticasone-salmeterol (ADVAIR) 100-50 MCG/DOSE inhaler Inhale 1 puff into the lungs every 12 hours 180 each 1     hydrochlorothiazide (HYDRODIURIL) 25 MG tablet Take 1 tablet (25 mg) by mouth daily 90 tablet 1     levalbuterol (XOPENEX HFA) 45 MCG/ACT inhaler Inhale 1-2 puffs into the lungs every 4 hours as needed for shortness of breath / dyspnea 45 g 3     Social History     Tobacco Use     Smoking status: Never Smoker     Smokeless tobacco: Never Used   Substance Use Topics     Alcohol use: Yes     Comment: once a week tops       ROS:   Review of systems negative except as stated above.    OBJECTIVE:  BP (!) 140/83   Pulse 103   Temp 98.9  F (37.2  C) (Tympanic)   Wt 95.3 kg (210 lb)   SpO2 99%   BMI 30.23 kg/m    GENERAL APPEARANCE: healthy, alert and no distress  RESP: lungs with no audible wheezes or increase work of  breathing  ABDOMEN:  soft, mild tenderness lower abdomen  PSYCH: mentation appears normal and affect normal/bright    Results for orders placed or performed in visit on 12/12/21   UA Macro with Reflex to Micro and Culture - lab collect     Status: Abnormal    Specimen: Urine, Clean Catch   Result Value Ref Range    Color Urine Yellow Colorless, Straw, Light Yellow, Yellow    Appearance Urine Clear Clear    Glucose Urine >=1000 (A) Negative mg/dL    Bilirubin Urine Negative Negative    Ketones Urine Trace (A) Negative mg/dL    Specific Gravity Urine 1.020 1.003 - 1.035    Blood Urine Negative Negative    pH Urine 5.5 5.0 - 7.0    Protein Albumin Urine Negative Negative mg/dL    Urobilinogen Urine 0.2 0.2, 1.0 E.U./dL    Nitrite Urine Negative Negative    Leukocyte Esterase Urine Negative Negative    Narrative    Microscopic not indicated   Comprehensive metabolic panel     Status: Abnormal   Result Value Ref Range    Sodium 137 133 - 144 mmol/L    Potassium 4.3 3.4 - 5.3 mmol/L    Chloride 99 94 - 109 mmol/L    Carbon Dioxide (CO2) 32 20 - 32 mmol/L    Anion Gap 6 3 - 14 mmol/L    Urea Nitrogen 10 7 - 30 mg/dL    Creatinine 0.60 (L) 0.66 - 1.25 mg/dL    Calcium 10.0 8.5 - 10.1 mg/dL    Glucose 338 (H) 70 - 99 mg/dL    Alkaline Phosphatase 170 (H) 40 - 150 U/L    AST 50 (H) 0 - 45 U/L     U/L    Protein Total 7.9 6.8 - 8.8 g/dL    Albumin 3.9 3.4 - 5.0 g/dL    Bilirubin Total 1.0 0.2 - 1.3 mg/dL    GFR Estimate >90 >60 mL/min/1.73m2   CBC with platelets and differential     Status: None   Result Value Ref Range    WBC Count 9.4 4.0 - 11.0 10e3/uL    RBC Count 5.61 4.40 - 5.90 10e6/uL    Hemoglobin 16.0 13.3 - 17.7 g/dL    Hematocrit 46.7 40.0 - 53.0 %    MCV 83 78 - 100 fL    MCH 28.5 26.5 - 33.0 pg    MCHC 34.3 31.5 - 36.5 g/dL    RDW 13.3 10.0 - 15.0 %    Platelet Count 212 150 - 450 10e3/uL    % Neutrophils 76 %    % Lymphocytes 15 %    % Monocytes 8 %    % Eosinophils 2 %    % Basophils 0 %    Absolute  Neutrophils 7.1 1.6 - 8.3 10e3/uL    Absolute Lymphocytes 1.4 0.8 - 5.3 10e3/uL    Absolute Monocytes 0.7 0.0 - 1.3 10e3/uL    Absolute Eosinophils 0.2 0.0 - 0.7 10e3/uL    Absolute Basophils 0.0 0.0 - 0.2 10e3/uL   CBC with platelets and differential     Status: None    Narrative    The following orders were created for panel order CBC with platelets and differential.  Procedure                               Abnormality         Status                     ---------                               -----------         ------                     CBC with platelets and d...[[id number]]                      Final result                 Please view results for these tests on the individual orders.       ASSESSMENT/PLAN:   (R39.89) Urinary problem  (primary encounter diagnosis)  Plan: UA Macro with Reflex to Micro and Culture - lab        collect, Urine Culture, CBC with platelets and         differential, Comprehensive metabolic panel            (R10.30) Lower abdominal pain  Plan: CBC with platelets and differential,         Comprehensive metabolic panel            Reassurance given in regards to UA results - no concerns for UTI.  Due to diabetes, will obtain full urine culture and treat if true bacterial infection.  Reviewed that urinary frequency may be due to uncontrolled diabetes.  Encourage to drink plenty of fluids.    Discussed lower abdominal pain- unclear etiology, as patient is not in acute distress and non-toxic that further evaluation can be obtained by primary provider.  Reviewed limitations of UC in imaging with on Xray capability, that abdominal pain evaluation may require CT scan.  As patient does have normal WBC, okay to monitor symptoms for now.  Reviewed that if develops worsening abdominal pain or fever that will need to be seen in ER for further evaluation.    Follow up with primary provider within 1-2 days for recheck    Yared Alberts MD  December 12, 2021 7:30 PM

## 2021-12-14 LAB — BACTERIA UR CULT: NO GROWTH

## 2022-01-11 NOTE — PROGRESS NOTES
"Tio is a 36 year old who is being evaluated via a billable video visit.      How would you like to obtain your AVS? MyChart  If the video visit is dropped, the invitation should be resent by: Text to cell phone: [phone]  Will anyone else be joining your video visit? No    Video Start Time: 0720    Assessment & Plan     Type 2 diabetes mellitus without complication, without long-term current use of insulin (H)    - dulaglutide (TRULICITY) 1.5 MG/0.5ML pen; Inject 1.5 mg Subcutaneous every 7 days  - metFORMIN (GLUCOPHAGE-XR) 500 MG 24 hr tablet; Take 2 tablets (1,000 mg) by mouth daily (with dinner)  - glipiZIDE (GLUCOTROL XL) 10 MG 24 hr tablet; Take 1 tablet (10 mg) by mouth daily  - **A1C FUTURE 3mo; Future    20 minutes spent on the date of the encounter doing chart review, history and exam, documentation and further activities per the note     BMI:   Estimated body mass index is 30.23 kg/m  as calculated from the following:    Height as of 6/9/21: 1.775 m (5' 9.88\").    Weight as of 12/12/21: 95.3 kg (210 lb).   Weight management plan: Discussed healthy diet and exercise guidelines    See Patient Instructions: let me know how blood sugars are doing. Follow up as needed or in 6 months.     Return in about 6 months (around 7/12/2022), or if symptoms worsen or fail to improve.    MORENO Parson  [facility] [place]    Akilah Kinsey is a 36 year old who presents for the following health issues     History of Present Illness       Diabetes:   He presents for follow up of diabetes.  He is checking home blood glucose one time daily. He checks blood glucose before meals.  Blood glucose is sometimes over 200 and never under 70. When his blood glucose is low, the patient is asymptomatic for confusion, blurred vision, lethargy and reports not feeling dizzy, shaky, or weak.  He is concerned about blood sugar frequently over 200.  He is not experiencing numbness or burning in feet, excessive " thirst, blurry vision, weight changes or redness, sores or blisters on feet. The patient has had a diabetic eye exam in the last 12 months.         He eats 2-3 servings of fruits and vegetables daily.He consumes 0 sweetened beverage(s) daily.He exercises with enough effort to increase his heart rate 30 to 60 minutes per day.  He exercises with enough effort to increase his heart rate 3 or less days per week.   He is taking medications regularly.     He reports his sugars have been hit or miss. Sometimes good, sometimes > 300.  Ended up in urgent care due to this. Thought he could have had a bladder infection.  He did tolerate his metformin and glipizide in the past. He did not like how many pills he was taking daily.  Will try adding oral to trulicity to see how his sugars do.     Review of Systems   Constitutional, HEENT, cardiovascular, pulmonary, GI, , musculoskeletal, neuro, skin, endocrine and psych systems are negative, except as otherwise noted.      Objective           Vitals:  No vitals were obtained today due to virtual visit.    Physical Exam   GENERAL: Healthy, alert and no distress  EYES: Eyes grossly normal to inspection.  No discharge or erythema, or obvious scleral/conjunctival abnormalities.  RESP: No audible wheeze, cough, or visible cyanosis.  No visible retractions or increased work of breathing.    SKIN: Visible skin clear. No significant rash, abnormal pigmentation or lesions.  NEURO: Cranial nerves grossly intact.  Mentation and speech appropriate for age.  PSYCH: Mentation appears normal, affect normal/bright, judgement and insight intact, normal speech and appearance well-groomed.    See orders        Video-Visit Details    Type of service:  Video Visit    Video End Time:0737    Originating Location (pt. Location): Home    Distant Location (provider location):  working remote from home    Platform used for Video Visit: [employer]

## 2022-01-12 ENCOUNTER — VIRTUAL VISIT (OUTPATIENT)
Dept: FAMILY MEDICINE | Facility: CLINIC | Age: 37
End: 2022-01-12
Payer: COMMERCIAL

## 2022-01-12 DIAGNOSIS — E11.9 TYPE 2 DIABETES MELLITUS WITHOUT COMPLICATION, WITHOUT LONG-TERM CURRENT USE OF INSULIN (H): ICD-10-CM

## 2022-01-12 PROCEDURE — 99214 OFFICE O/P EST MOD 30 MIN: CPT | Mod: GT | Performed by: NURSE PRACTITIONER

## 2022-01-12 RX ORDER — METFORMIN HCL 500 MG
1000 TABLET, EXTENDED RELEASE 24 HR ORAL
Qty: 180 TABLET | Refills: 1 | Status: SHIPPED | OUTPATIENT
Start: 2022-01-12 | End: 2022-05-27

## 2022-01-12 RX ORDER — GLIPIZIDE 10 MG/1
10 TABLET, FILM COATED, EXTENDED RELEASE ORAL DAILY
Qty: 90 TABLET | Refills: 1 | Status: SHIPPED | OUTPATIENT
Start: 2022-01-12 | End: 2022-05-27

## 2022-01-12 RX ORDER — DULAGLUTIDE 1.5 MG/.5ML
1.5 INJECTION, SOLUTION SUBCUTANEOUS
Qty: 7 ML | Refills: 1 | Status: SHIPPED | OUTPATIENT
Start: 2022-01-12 | End: 2022-05-27 | Stop reason: DRUGHIGH

## 2022-03-01 ENCOUNTER — TRANSFERRED RECORDS (OUTPATIENT)
Dept: MULTI SPECIALTY CLINIC | Facility: CLINIC | Age: 37
End: 2022-03-01

## 2022-03-01 LAB — RETINOPATHY: NORMAL

## 2022-03-10 ENCOUNTER — TELEPHONE (OUTPATIENT)
Dept: FAMILY MEDICINE | Facility: CLINIC | Age: 37
End: 2022-03-10
Payer: COMMERCIAL

## 2022-03-10 DIAGNOSIS — E11.9 TYPE 2 DIABETES MELLITUS WITHOUT COMPLICATION, WITHOUT LONG-TERM CURRENT USE OF INSULIN (H): Primary | ICD-10-CM

## 2022-03-10 DIAGNOSIS — E78.5 HYPERLIPIDEMIA LDL GOAL <100: ICD-10-CM

## 2022-03-10 NOTE — TELEPHONE ENCOUNTER
Patient Quality Outreach    Patient is due for the following:   Diabetes -  A1C, LDL (Fasting) and Eye Exam  Asthma  -  ACT needed and AAP  Hypertension -  BP check    Type of outreach:    Sent [employer] message.      Questions for provider review:    None     Rakel Sim  Chart routed to Care Team.

## 2022-03-10 NOTE — LETTER
March 30, 2022      Tio Merino  [address]   [place] [facility] [address]        Dear Tio,       We have tried to contact you by phone and/or [employer] several times.    Your healthcare team cares about your health. To provide you with the best care,   we have reviewed your chart and based on our findings, we see that you are due to:     - ASTHMA FOLLOW UP:  Complete and return the attached Asthma Control Test.  If your total score is 19 or less or you have been to the ER or urgent care for your asthma, then please schedule an asthma follow-up appointment.  - DIABETES FOLLOW UP: Schedule a diabetic follow up appointment as a Office Visit. Patients with diabetes should generally see their provider every 3-6 months.    Schedule a DIABETIC EYE EXAM.  This exam is done with an optometrist. You can schedule this appointment with your eye doctor.  If you need a referral, please let us know.  - HYPERTENSION FOLLOW UP: Office Visit    If you have already completed these items, please contact the clinic via phone or   [facility] so your care team can review and update your records. Thank you for   choosing [facility] for your healthcare needs. For any questions,   concerns, or to schedule an appointment please contact the clinic.       Healthy Regards,      Your [facility] Care Team

## 2022-03-19 ENCOUNTER — HEALTH MAINTENANCE LETTER (OUTPATIENT)
Age: 37
End: 2022-03-19

## 2022-03-23 NOTE — TELEPHONE ENCOUNTER
Patient Quality Outreach    Type of outreach:    Phone, left message for patient/parent to call back.

## 2022-05-14 ENCOUNTER — HEALTH MAINTENANCE LETTER (OUTPATIENT)
Age: 37
End: 2022-05-14

## 2022-05-24 DIAGNOSIS — I10 BENIGN ESSENTIAL HYPERTENSION: ICD-10-CM

## 2022-05-25 RX ORDER — AMLODIPINE BESYLATE 2.5 MG/1
TABLET ORAL
Qty: 90 TABLET | Refills: 0 | Status: SHIPPED | OUTPATIENT
Start: 2022-05-25 | End: 2022-05-27

## 2022-05-25 RX ORDER — HYDROCHLOROTHIAZIDE 25 MG/1
TABLET ORAL
Qty: 90 TABLET | Refills: 0 | Status: SHIPPED | OUTPATIENT
Start: 2022-05-25 | End: 2022-05-27

## 2022-05-25 NOTE — TELEPHONE ENCOUNTER
Routing refill request to provider for review/approval because:  Drug not on the [facility] refill protocol   BP Readings from Last 3 Encounters:   12/12/21 (!) 140/83   10/22/21 132/87   06/09/21 (!) 138/92     Creatinine   Date Value Ref Range Status   12/12/2021 0.60 (L) 0.66 - 1.25 mg/dL Final   06/09/2021 0.79 0.66 - 1.25 mg/dL Final     Has appt 5/27/22

## 2022-05-25 NOTE — PROGRESS NOTES
Tio is a 37 year old who is being evaluated via a billable video visit.      How would you like to obtain your AVS? MyChart  If the video visit is dropped, the invitation should be resent by: Text to cell phone: [phone]  Will anyone else be joining your video visit? No    Video Start Time: 110pm    Assessment & Plan     Type 2 diabetes mellitus without complication, without long-term current use of insulin (H)    - Hemoglobin A1c; Future  - Dulaglutide (TRULICITY) 3 MG/0.5ML SOPN; Inject 3 mg Subcutaneous once a week  - glipiZIDE (GLUCOTROL XL) 10 MG 24 hr tablet; Take 1 tablet (10 mg) by mouth daily  - metFORMIN (GLUCOPHAGE XR) 500 MG 24 hr tablet; Take 2 tablets (1,000 mg) by mouth daily (with dinner)    Benign essential hypertension    - amLODIPine (NORVASC) 2.5 MG tablet; Take 1 tablet (2.5 mg) by mouth daily  - hydrochlorothiazide (HYDRODIURIL) 25 MG tablet; Take 1 tablet (25 mg) by mouth daily  - Renal panel (Alb, BUN, Ca, Cl, CO2, Creat, Gluc, Phos, K, Na); Future    Hyperlipidemia LDL goal <100    - Lipid panel reflex to direct LDL Fasting; Future  - atorvastatin (LIPITOR) 20 MG tablet; Take 1 tablet (20 mg) by mouth daily    Moderate persistent asthma without complication    - levalbuterol (XOPENEX HFA) 45 MCG/ACT inhaler; Inhale 1-2 puffs into the lungs every 4 hours as needed for shortness of breath / dyspnea    25 minutes spent on the date of the encounter doing chart review, history and exam, documentation and further activities per the note     See Patient Instructions: follow up as needed. Pt in agreement. He has a lab appt scheduled.     Return in about 6 months (around 11/27/2022), or if symptoms worsen or fail to improve.    MORENO Parson  [facility] [place]   Tio is a 37 year old who presents for the following health issues     HPI       Reports he has been checking blood sugars more often with the increase in metformin and trulicity.  Feels like if we  increased his trulicity his blood sugars would be perfect.  Rare low blood sugars, does have a snack with him at all time if needed.  Mental health is doing well.  Checks his BP once every 1-2 weeks at grocery store, BPs have been normal. Tolerating his medications.     Diabetes Follow-up    How often are you checking your blood sugar? Two times daily  Blood sugar testing frequency justification:  Adjustment of medication(s)  What time of day are you checking your blood sugars (select all that apply)?  Before meals and After meals  Have you had any blood sugars above 200?  No  Have you had any blood sugars below 70?  No    What symptoms do you notice when your blood sugar is low?  None    What concerns do you have today about your diabetes? Other: discuss changing trulicity dose     Do you have any of these symptoms? (Select all that apply)  No numbness or tingling in feet.  No redness, sores or blisters on feet.  No complaints of excessive thirst.  No reports of blurry vision.  No significant changes to weight.    Have you had a diabetic eye exam in the last 12 months? Yes- Date of last eye exam: 8/21,  Location: [facility]                Hyperlipidemia Follow-Up      Are you regularly taking any medication or supplement to lower your cholesterol?   Yes- statin    Are you having muscle aches or other side effects that you think could be caused by your cholesterol lowering medication?  No    Hypertension Follow-up      Do you check your blood pressure regularly outside of the clinic? No     Are you following a low salt diet? Yes    Are your blood pressures ever more than 140 on the top number (systolic) OR more   than 90 on the bottom number (diastolic), for example 140/90? na    BP Readings from Last 2 Encounters:   12/12/21 (!) 140/83   10/22/21 132/87     Hemoglobin A1C POCT (%)   Date Value   06/09/2021 10.4 (H)   06/25/2020 10.5 (H)     Hemoglobin A1C (%)   Date Value   10/22/2021 10.2 (H)     LDL  Cholesterol Calculated (mg/dL)   Date Value   10/22/2021 74   06/09/2021 206 (H)   09/10/2020 167 (H)         Review of Systems   Constitutional, HEENT, cardiovascular, pulmonary, GI, , musculoskeletal, neuro, skin, endocrine and psych systems are negative, except as otherwise noted.      Objective           Vitals:  No vitals were obtained today due to virtual visit.    Physical Exam   GENERAL: Healthy, alert and no distress  EYES: Eyes grossly normal to inspection.  No discharge or erythema, or obvious scleral/conjunctival abnormalities.  RESP: No audible wheeze, cough, or visible cyanosis.  No visible retractions or increased work of breathing.    SKIN: Visible skin clear. No significant rash, abnormal pigmentation or lesions.  NEURO: Cranial nerves grossly intact.  Mentation and speech appropriate for age.  PSYCH: Mentation appears normal, affect normal/bright, judgement and insight intact, normal speech and appearance well-groomed.    See orders      Video-Visit Details    Type of service:  Video Visit    Video End Time:1:19 PM    Originating Location (pt. Location): Other work    Distant Location (provider location): working remote from home    Platform used for Video Visit: [employer]

## 2022-05-27 ENCOUNTER — VIRTUAL VISIT (OUTPATIENT)
Dept: FAMILY MEDICINE | Facility: CLINIC | Age: 37
End: 2022-05-27
Payer: COMMERCIAL

## 2022-05-27 DIAGNOSIS — E78.5 HYPERLIPIDEMIA LDL GOAL <100: ICD-10-CM

## 2022-05-27 DIAGNOSIS — J45.40 MODERATE PERSISTENT ASTHMA WITHOUT COMPLICATION: ICD-10-CM

## 2022-05-27 DIAGNOSIS — I10 BENIGN ESSENTIAL HYPERTENSION: ICD-10-CM

## 2022-05-27 DIAGNOSIS — E11.9 TYPE 2 DIABETES MELLITUS WITHOUT COMPLICATION, WITHOUT LONG-TERM CURRENT USE OF INSULIN (H): ICD-10-CM

## 2022-05-27 PROCEDURE — 99213 OFFICE O/P EST LOW 20 MIN: CPT | Mod: GT | Performed by: NURSE PRACTITIONER

## 2022-05-27 RX ORDER — DULAGLUTIDE 3 MG/.5ML
3 INJECTION, SOLUTION SUBCUTANEOUS WEEKLY
Qty: 7 ML | Refills: 1 | Status: SHIPPED | OUTPATIENT
Start: 2022-05-27 | End: 2022-11-21

## 2022-05-27 RX ORDER — AMLODIPINE BESYLATE 2.5 MG/1
2.5 TABLET ORAL DAILY
Qty: 90 TABLET | Refills: 1 | Status: SHIPPED | OUTPATIENT
Start: 2022-05-27 | End: 2022-12-21

## 2022-05-27 RX ORDER — LEVALBUTEROL TARTRATE 45 UG/1
1-2 AEROSOL, METERED ORAL EVERY 4 HOURS PRN
Qty: 45 G | Refills: 3 | Status: SHIPPED | OUTPATIENT
Start: 2022-05-27 | End: 2023-08-23

## 2022-05-27 RX ORDER — METFORMIN HCL 500 MG
1000 TABLET, EXTENDED RELEASE 24 HR ORAL
Qty: 180 TABLET | Refills: 1 | Status: SHIPPED | OUTPATIENT
Start: 2022-05-27 | End: 2022-12-15

## 2022-05-27 RX ORDER — GLIPIZIDE 10 MG/1
10 TABLET, FILM COATED, EXTENDED RELEASE ORAL DAILY
Qty: 90 TABLET | Refills: 1 | Status: SHIPPED | OUTPATIENT
Start: 2022-05-27 | End: 2022-12-21

## 2022-05-27 RX ORDER — ATORVASTATIN CALCIUM 20 MG/1
20 TABLET, FILM COATED ORAL DAILY
Qty: 90 TABLET | Refills: 3 | Status: SHIPPED | OUTPATIENT
Start: 2022-05-27 | End: 2023-06-23

## 2022-05-27 RX ORDER — HYDROCHLOROTHIAZIDE 25 MG/1
25 TABLET ORAL DAILY
Qty: 90 TABLET | Refills: 1 | Status: SHIPPED | OUTPATIENT
Start: 2022-05-27 | End: 2022-12-21

## 2022-05-27 ASSESSMENT — ASTHMA QUESTIONNAIRES: ACT_TOTALSCORE: 25

## 2022-05-27 NOTE — LETTER
My Asthma Action Plan    Name: Tio Merino   YOB: 1985  Date: 5/27/2022   My doctor: Danyell Banuelos NP   My clinic: [facility]        My Control Medicine:   My Rescue Medicine:    My Asthma Severity:   Moderate Persistent  Know your asthma triggers:                GREEN ZONE   Good Control    I feel good    No cough or wheeze    Can work, sleep and play without asthma symptoms       Take your asthma control medicine every day.     1. If exercise triggers your asthma, take your rescue medication    15 minutes before exercise or sports, and    During exercise if you have asthma symptoms  2. Spacer to use with inhaler: If you have a spacer, make sure to use it with your inhaler             YELLOW ZONE Getting Worse  I have ANY of these:    I do not feel good    Cough or wheeze    Chest feels tight    Wake up at night   1. Keep taking your Green Zone medications  2. Start taking your rescue medicine:    every 20 minutes for up to 1 hour. Then every 4 hours for 24-48 hours.  3. If you stay in the Yellow Zone for more than 12-24 hours, contact your doctor.  4. If you do not return to the Green Zone in 12-24 hours or you get worse, start taking your oral steroid medicine if prescribed by your provider.           RED ZONE Medical Alert - Get Help  I have ANY of these:    I feel awful    Medicine is not helping    Breathing getting harder    Trouble walking or talking    Nose opens wide to breathe       1. Take your rescue medicine NOW  2. If your provider has prescribed an oral steroid medicine, start taking it NOW  3. Call your doctor NOW  4. If you are still in the Red Zone after 20 minutes and you have not reached your doctor:    Take your rescue medicine again and    Call 911 or go to the emergency room right away    See your regular doctor within 2 weeks of an Emergency Room or Urgent Care visit for follow-up treatment.          Annual Reminders:  Meet with Asthma Educator,   Flu Shot in the Fall, consider Pneumonia Vaccination for patients with asthma (aged 19 and older).    Pharmacy:    [facility] [place] - [address] [facility] - [place] [address] [facility]    Electronically signed by Danyell Banuelos NP   Date: 05/27/22                      Asthma Triggers  How To Control Things That Make Your Asthma Worse    Triggers are things that make your asthma worse.  Look at the list below to help you find your triggers and what you can do about them.  You can help prevent asthma flare-ups by staying away from your triggers.      Trigger                                                          What you can do   Cigarette Smoke  Tobacco smoke can make asthma worse. Do not allow smoking in your home, car or around you.  Be sure no one smokes at a child s day care or school.  If you smoke, ask your health care provider for ways to help you quit.  Ask family members to quit too.  Ask your health care provider for a referral to Quit Plan to help you quit smoking, or call 1-517-767-PLAN.     Colds, Flu, Bronchitis  These are common triggers of asthma. Wash your hands often.  Don t touch your eyes, nose or mouth.  Get a flu shot every year.     Dust Mites  These are tiny bugs that live in cloth or carpet. They are too small to see. Wash sheets and blankets in hot water every week.   Encase pillows and mattress in dust mite proof covers.  Avoid having carpet if you can. If you have carpet, vacuum weekly.   Use a dust mask and HEPA vacuum.   Pollen and Outdoor Mold  Some people are allergic to trees, grass, or weed pollen, or molds. Try to keep your windows closed.  Limit time out doors when pollen count is high.   Ask you health care provider about taking medicine during allergy season.     Animal Dander  Some people are allergic to skin flakes, urine or saliva from pets with fur or feathers. Keep pets with fur or feathers out of your home.    If  you can t keep the pet outdoors, then keep the pet out of your bedroom.  Keep the bedroom door closed.  Keep pets off cloth furniture and away from stuffed toys.     Mice, Rats, and Cockroaches   Some people are allergic to the waste from these pests.   Cover food and garbage.  Clean up spills and food crumbs.  Store grease in the refrigerator.   Keep food out of the bedroom.   Indoor Mold  This can be a trigger if your home has high moisture. Fix leaking faucets, pipes, or other sources of water.   Clean moldy surfaces.  Dehumidify basement if it is damp and smelly.   Smoke, Strong Odors, and Sprays  These can reduce air quality. Stay away from strong odors and sprays, such as perfume, powder, hair spray, paints, smoke incense, paint, cleaning products, candles and new carpet.   Exercise or Sports  Some people with asthma have this trigger. Be active!  Ask your doctor about taking medicine before sports or exercise to prevent symptoms.    Warm up for 5-10 minutes before and after sports or exercise.     Other Triggers of Asthma  Cold air:  Cover your nose and mouth with a scarf.  Sometimes laughing or crying can be a trigger.  Some medicines and food can trigger asthma.

## 2022-06-08 ENCOUNTER — LAB (OUTPATIENT)
Dept: LAB | Facility: CLINIC | Age: 37
End: 2022-06-08
Payer: COMMERCIAL

## 2022-06-08 DIAGNOSIS — I10 BENIGN ESSENTIAL HYPERTENSION: ICD-10-CM

## 2022-06-08 DIAGNOSIS — E11.9 TYPE 2 DIABETES MELLITUS WITHOUT COMPLICATION, WITHOUT LONG-TERM CURRENT USE OF INSULIN (H): ICD-10-CM

## 2022-06-08 DIAGNOSIS — E78.5 HYPERLIPIDEMIA LDL GOAL <100: ICD-10-CM

## 2022-06-08 LAB
ALBUMIN SERPL-MCNC: 3.7 G/DL (ref 3.4–5)
ANION GAP SERPL CALCULATED.3IONS-SCNC: 4 MMOL/L (ref 3–14)
BUN SERPL-MCNC: 12 MG/DL (ref 7–30)
CALCIUM SERPL-MCNC: 9.1 MG/DL (ref 8.5–10.1)
CHLORIDE BLD-SCNC: 102 MMOL/L (ref 94–109)
CHOLEST SERPL-MCNC: 258 MG/DL
CO2 SERPL-SCNC: 30 MMOL/L (ref 20–32)
CREAT SERPL-MCNC: 0.74 MG/DL (ref 0.66–1.25)
FASTING STATUS PATIENT QL REPORTED: YES
GFR SERPL CREATININE-BSD FRML MDRD: >90 ML/MIN/1.73M2
GLUCOSE BLD-MCNC: 207 MG/DL (ref 70–99)
HBA1C MFR BLD: 8.9 % (ref 0–5.6)
HDLC SERPL-MCNC: 38 MG/DL
LDLC SERPL CALC-MCNC: 162 MG/DL
NONHDLC SERPL-MCNC: 220 MG/DL
PHOSPHATE SERPL-MCNC: 4.2 MG/DL (ref 2.5–4.5)
POTASSIUM BLD-SCNC: 4.5 MMOL/L (ref 3.4–5.3)
SODIUM SERPL-SCNC: 136 MMOL/L (ref 133–144)
TRIGL SERPL-MCNC: 289 MG/DL

## 2022-06-08 PROCEDURE — 36415 COLL VENOUS BLD VENIPUNCTURE: CPT

## 2022-06-08 PROCEDURE — 80061 LIPID PANEL: CPT

## 2022-06-08 PROCEDURE — 83036 HEMOGLOBIN GLYCOSYLATED A1C: CPT

## 2022-06-08 PROCEDURE — 80069 RENAL FUNCTION PANEL: CPT

## 2022-06-09 NOTE — RESULT ENCOUNTER NOTE
Rafy Kinsey,    Thank you for your recent office visit.    Here are your recent results.  Labs are improving, keep up the good work!    Feel free to contact me via [employer] or call the clinic at [phone].    Sincerely,    MARLY Dooley, FNP-BC

## 2022-09-03 ENCOUNTER — HEALTH MAINTENANCE LETTER (OUTPATIENT)
Age: 37
End: 2022-09-03

## 2022-11-21 DIAGNOSIS — E11.9 TYPE 2 DIABETES MELLITUS WITHOUT COMPLICATION, WITHOUT LONG-TERM CURRENT USE OF INSULIN (H): ICD-10-CM

## 2022-11-21 RX ORDER — DULAGLUTIDE 3 MG/.5ML
3 INJECTION, SOLUTION SUBCUTANEOUS WEEKLY
Qty: 7 ML | Refills: 0 | Status: SHIPPED | OUTPATIENT
Start: 2022-11-21 | End: 2022-12-20

## 2022-12-15 ENCOUNTER — MYC REFILL (OUTPATIENT)
Dept: FAMILY MEDICINE | Facility: CLINIC | Age: 37
End: 2022-12-15

## 2022-12-15 DIAGNOSIS — E11.9 TYPE 2 DIABETES MELLITUS WITHOUT COMPLICATION, WITHOUT LONG-TERM CURRENT USE OF INSULIN (H): ICD-10-CM

## 2022-12-15 RX ORDER — METFORMIN HCL 500 MG
TABLET, EXTENDED RELEASE 24 HR ORAL
Qty: 180 TABLET | Refills: 0 | Status: SHIPPED | OUTPATIENT
Start: 2022-12-15 | End: 2022-12-21

## 2022-12-16 RX ORDER — DULAGLUTIDE 3 MG/.5ML
3 INJECTION, SOLUTION SUBCUTANEOUS WEEKLY
Qty: 7 ML | Refills: 0 | OUTPATIENT
Start: 2022-12-16

## 2022-12-19 ASSESSMENT — ASTHMA QUESTIONNAIRES
QUESTION_4 LAST FOUR WEEKS HOW OFTEN HAVE YOU USED YOUR RESCUE INHALER OR NEBULIZER MEDICATION (SUCH AS ALBUTEROL): TWO OR THREE TIMES PER WEEK
QUESTION_5 LAST FOUR WEEKS HOW WOULD YOU RATE YOUR ASTHMA CONTROL: WELL CONTROLLED
QUESTION_1 LAST FOUR WEEKS HOW MUCH OF THE TIME DID YOUR ASTHMA KEEP YOU FROM GETTING AS MUCH DONE AT WORK, SCHOOL OR AT HOME: NONE OF THE TIME
ACT_TOTALSCORE: 20
QUESTION_2 LAST FOUR WEEKS HOW OFTEN HAVE YOU HAD SHORTNESS OF BREATH: THREE TO SIX TIMES A WEEK
ACT_TOTALSCORE: 20
QUESTION_3 LAST FOUR WEEKS HOW OFTEN DID YOUR ASTHMA SYMPTOMS (WHEEZING, COUGHING, SHORTNESS OF BREATH, CHEST TIGHTNESS OR PAIN) WAKE YOU UP AT NIGHT OR EARLIER THAN USUAL IN THE MORNING: NOT AT ALL

## 2022-12-19 NOTE — TELEPHONE ENCOUNTER
See mychart below. Patient has appointment schedule for 12/21/22 virtually. Rakel Sim MA   [employer]

## 2022-12-20 RX ORDER — DULAGLUTIDE 3 MG/.5ML
3 INJECTION, SOLUTION SUBCUTANEOUS WEEKLY
Qty: 7 ML | Refills: 0 | Status: SHIPPED | OUTPATIENT
Start: 2022-12-20 | End: 2022-12-21

## 2022-12-21 ENCOUNTER — VIRTUAL VISIT (OUTPATIENT)
Dept: FAMILY MEDICINE | Facility: CLINIC | Age: 37
End: 2022-12-21
Payer: COMMERCIAL

## 2022-12-21 ENCOUNTER — TELEPHONE (OUTPATIENT)
Dept: FAMILY MEDICINE | Facility: CLINIC | Age: 37
End: 2022-12-21

## 2022-12-21 DIAGNOSIS — E11.9 TYPE 2 DIABETES MELLITUS WITHOUT COMPLICATION, WITHOUT LONG-TERM CURRENT USE OF INSULIN (H): ICD-10-CM

## 2022-12-21 DIAGNOSIS — I10 BENIGN ESSENTIAL HYPERTENSION: ICD-10-CM

## 2022-12-21 DIAGNOSIS — Z13.220 SCREENING FOR HYPERLIPIDEMIA: ICD-10-CM

## 2022-12-21 DIAGNOSIS — Z71.89 ADVANCED CARE PLANNING/COUNSELING DISCUSSION: Primary | ICD-10-CM

## 2022-12-21 PROCEDURE — 99214 OFFICE O/P EST MOD 30 MIN: CPT | Mod: GT | Performed by: NURSE PRACTITIONER

## 2022-12-21 RX ORDER — METFORMIN HCL 500 MG
TABLET, EXTENDED RELEASE 24 HR ORAL
Qty: 180 TABLET | Refills: 0 | Status: SHIPPED | OUTPATIENT
Start: 2022-12-21 | End: 2023-09-06

## 2022-12-21 RX ORDER — AMLODIPINE BESYLATE 2.5 MG/1
2.5 TABLET ORAL DAILY
Qty: 90 TABLET | Refills: 1 | Status: SHIPPED | OUTPATIENT
Start: 2022-12-21 | End: 2023-08-07

## 2022-12-21 RX ORDER — HYDROCHLOROTHIAZIDE 25 MG/1
25 TABLET ORAL DAILY
Qty: 90 TABLET | Refills: 1 | Status: SHIPPED | OUTPATIENT
Start: 2022-12-21 | End: 2023-10-09

## 2022-12-21 RX ORDER — GLIPIZIDE 10 MG/1
10 TABLET, FILM COATED, EXTENDED RELEASE ORAL DAILY
Qty: 90 TABLET | Refills: 1 | Status: SHIPPED | OUTPATIENT
Start: 2022-12-21 | End: 2023-10-09

## 2022-12-21 NOTE — PROGRESS NOTES
Tio is a 37 year old who is being evaluated via a billable video visit.      How would you like to obtain your AVS? MyChart  If the video visit is dropped, the invitation should be resent by: Text to cell phone: [phone]  Will anyone else be joining your video visit? No      Assessment & Plan     Screening for hyperlipidemia    - Lipid panel reflex to direct LDL Non-fasting; Future    Advanced care planning/counseling discussion      Type 2 diabetes mellitus without complication, without long-term current use of insulin (H)    - Adult Eye  Referral; Future  - HEMOGLOBIN A1C; Future  - Albumin Random Urine Quantitative with Creat Ratio; Future  - Semaglutide, 1 MG/DOSE, 4 MG/3ML SOPN; Inject 1 mg Subcutaneous every 14 days Let me know how sugars are doing for increased dose if needed.  - glipiZIDE (GLUCOTROL XL) 10 MG 24 hr tablet; Take 1 tablet (10 mg) by mouth daily  - metFORMIN (GLUCOPHAGE XR) 500 MG 24 hr tablet; TAKE 2 TABLETS BY MOUTH ONCE DAILY WITH SUPPER Strength: 500 mg    Benign essential hypertension    - hydrochlorothiazide (HYDRODIURIL) 25 MG tablet; Take 1 tablet (25 mg) by mouth daily  - amLODIPine (NORVASC) 2.5 MG tablet; Take 1 tablet (2.5 mg) by mouth daily  - Renal panel (Alb, BUN, Ca, Cl, CO2, Creat, Gluc, Phos, K, Na); Future    30 minutes spent on the date of the encounter doing chart review, history and exam, documentation and further activities per the note     See Patient Instructions: Make lab appointment.  Stop Trulicity and start Ozempic.  Monitor blood sugars.  Let me know how medication is working if blood sugars have increased we can increase Ozempic to 2 mg.  Follow-up for healthcare questions or concerns.  Patient in agreement.  Return in about 6 months (around 6/21/2023), or if symptoms worsen or fail to improve.    MORENO JOINER  [facility] [place] Kinsey is a 37 year old, presenting for the following health  issues:  RECHECK    A1c and blood sugars have been the best they have ever been while on Trulicity.  Currently taking 3 mg weekly, has been having trouble getting medication in at pharmacy, pharmacist told them will not be able to get Trulicity in until March 2023.  Due to shortage of Trulicity, pharmacist discussed patient switching to Ozempic.  Patient read about Ozempic and thinks patients are having better success with this medication and liking it more.  We will transition to Ozempic from Trulicity dosing is not the same.  Advised patient to monitor his blood sugars more if needing increased dose we can do that.  Continue other diabetic medications.  Does not have a family history of thyroid cancer.  Is aware that labs are due at this time.  History of Present Illness     Asthma:  He presents for follow up of asthma.  He has no cough, no wheezing, and no shortness of breath. He is using a relief medication a few times a week. He does not miss any doses of his controller medication throughout the week.Patient is aware of the following triggers: cold air and smoke. The patient has not had a visit to the Emergency Room, Urgent Care or Hospital due to asthma since the last clinic visit.     Diabetes:   He presents for follow up of diabetes.  He is checking home blood glucose one time daily. He checks blood glucose before meals.  Blood glucose is never over 200 and never under 70. He is aware of hypoglycemia symptoms including shakiness and weakness. He has no concerns regarding his diabetes at this time.  He is not experiencing numbness or burning in feet, excessive thirst, blurry vision, weight changes or redness, sores or blisters on feet. The patient has had a diabetic eye exam in the last 12 months. Eye exam performed on March 2022.         He eats 2-3 servings of fruits and vegetables daily.He consumes 0 sweetened beverage(s) daily.He exercises with enough effort to increase his heart rate 30 to 60 minutes per  day.  He exercises with enough effort to increase his heart rate 4 days per week.   He is taking medications regularly.     Questions regarding Trulicity 3mg. Patient states that pharmacy is out of medication and won't be available until 2023.     Review of Systems   Constitutional, HEENT, cardiovascular, pulmonary, GI, , musculoskeletal, neuro, skin, endocrine and psych systems are negative, except as otherwise noted.      Objective           Vitals:  No vitals were obtained today due to virtual visit.    Physical Exam   GENERAL: Healthy, alert and no distress  EYES: Eyes grossly normal to inspection.  No discharge or erythema, or obvious scleral/conjunctival abnormalities.  RESP: No audible wheeze, cough, or visible cyanosis.  No visible retractions or increased work of breathing.    SKIN: Visible skin clear. No significant rash, abnormal pigmentation or lesions.  NEURO: Cranial nerves grossly intact.  Mentation and speech appropriate for age.  PSYCH: Mentation appears normal, affect normal/bright, judgement and insight intact, normal speech and appearance well-groomed.    See orders    Video-Visit Details    Type of service:  Video Visit     Originating Location (pt. Location): Other Work  Distant Location (provider location):  Off-site  Platform used for Video Visit: [email or web address]

## 2022-12-21 NOTE — TELEPHONE ENCOUNTER
Pharmacy calling to verify Ozempic dosing. Typically this is given every 7 days and prescription is for every 14 days. Was this intentional? If not they request a new prescription be sent over to them.     Thank you,  Brittany Aguilar RN

## 2022-12-26 NOTE — TELEPHONE ENCOUNTER
Panel Management Review      Patient has the following on his problem list:     Asthma review     ACT Total Scores 7/25/2019   ACT TOTAL SCORE -   ASTHMA ER VISITS -   ASTHMA HOSPITALIZATIONS -   ACT TOTAL SCORE (Goal Greater than or Equal to 20) 12   In the past 12 months, how many times did you visit the emergency room for your asthma without being admitted to the hospital? 0   In the past 12 months, how many times were you hospitalized overnight because of your asthma? 0      1. Is Asthma diagnosis on the Problem List? Yes    2. Is Asthma listed on Health Maintenance? Yes    3. Patient is due for:  none      Composite cancer screening  Chart review shows that this patient is due/due soon for the following None  Summary:    Patient is due/failing the following:   ACT-failed    Action needed:   None, patient is already scheduled with his PCP in 3 months for asthma and diabetes recheck.     Type of outreach:    none    Questions for provider review:    None                                                                                                                                    Martin DUENAS MA       Chart routed to close .          
No

## 2023-01-15 ENCOUNTER — HEALTH MAINTENANCE LETTER (OUTPATIENT)
Age: 38
End: 2023-01-15

## 2023-02-24 ENCOUNTER — LAB (OUTPATIENT)
Dept: LAB | Facility: CLINIC | Age: 38
End: 2023-02-24
Payer: COMMERCIAL

## 2023-02-24 DIAGNOSIS — Z13.220 SCREENING FOR HYPERLIPIDEMIA: ICD-10-CM

## 2023-02-24 DIAGNOSIS — I10 BENIGN ESSENTIAL HYPERTENSION: ICD-10-CM

## 2023-02-24 DIAGNOSIS — E11.9 TYPE 2 DIABETES MELLITUS WITHOUT COMPLICATION, WITHOUT LONG-TERM CURRENT USE OF INSULIN (H): ICD-10-CM

## 2023-02-24 LAB
ALBUMIN SERPL BCG-MCNC: 4.3 G/DL (ref 3.5–5.2)
ANION GAP SERPL CALCULATED.3IONS-SCNC: 12 MMOL/L (ref 7–15)
BUN SERPL-MCNC: 10.8 MG/DL (ref 6–20)
CALCIUM SERPL-MCNC: 9.3 MG/DL (ref 8.6–10)
CHLORIDE SERPL-SCNC: 102 MMOL/L (ref 98–107)
CHOLEST SERPL-MCNC: 163 MG/DL
CREAT SERPL-MCNC: 0.76 MG/DL (ref 0.67–1.17)
CREAT UR-MCNC: 393 MG/DL
DEPRECATED HCO3 PLAS-SCNC: 26 MMOL/L (ref 22–29)
GFR SERPL CREATININE-BSD FRML MDRD: >90 ML/MIN/1.73M2
GLUCOSE SERPL-MCNC: 166 MG/DL (ref 70–99)
HBA1C MFR BLD: 8.3 % (ref 0–5.6)
HDLC SERPL-MCNC: 34 MG/DL
LDLC SERPL CALC-MCNC: 92 MG/DL
MICROALBUMIN UR-MCNC: 28.4 MG/L
MICROALBUMIN/CREAT UR: 7.23 MG/G CR (ref 0–17)
NONHDLC SERPL-MCNC: 129 MG/DL
PHOSPHATE SERPL-MCNC: 3.9 MG/DL (ref 2.5–4.5)
POTASSIUM SERPL-SCNC: 4.4 MMOL/L (ref 3.4–5.3)
SODIUM SERPL-SCNC: 140 MMOL/L (ref 136–145)
TRIGL SERPL-MCNC: 183 MG/DL

## 2023-02-24 PROCEDURE — 82570 ASSAY OF URINE CREATININE: CPT

## 2023-02-24 PROCEDURE — 80061 LIPID PANEL: CPT

## 2023-02-24 PROCEDURE — 83036 HEMOGLOBIN GLYCOSYLATED A1C: CPT

## 2023-02-24 PROCEDURE — 36415 COLL VENOUS BLD VENIPUNCTURE: CPT

## 2023-02-24 PROCEDURE — 80069 RENAL FUNCTION PANEL: CPT

## 2023-02-24 PROCEDURE — 82043 UR ALBUMIN QUANTITATIVE: CPT

## 2023-02-28 DIAGNOSIS — E11.9 TYPE 2 DIABETES MELLITUS WITHOUT COMPLICATION, WITHOUT LONG-TERM CURRENT USE OF INSULIN (H): ICD-10-CM

## 2023-02-28 RX ORDER — SEMAGLUTIDE 1.34 MG/ML
INJECTION, SOLUTION SUBCUTANEOUS
Qty: 3 ML | Refills: 1 | Status: SHIPPED | OUTPATIENT
Start: 2023-02-28 | End: 2023-05-01

## 2023-03-01 NOTE — RESULT ENCOUNTER NOTE
Rafy Kinsey,    Thank you for your recent office visit.    Here are your recent results.  Your nonfasting labs have improved from a year ago; but they could be better.  It appears you are taking 1 mg of Ozempic weekly.  It can be increased to 2 mg do want me to see if that would be covered by her insurance?  That would more than likely help you lose more weight and improve your A1c.  Let me know what you think.    Feel free to contact me via [employer] or call the clinic at [phone].    Sincerely,    MARLY Dooley, FNP-BC

## 2023-04-18 ENCOUNTER — TELEPHONE (OUTPATIENT)
Dept: FAMILY MEDICINE | Facility: CLINIC | Age: 38
End: 2023-04-18
Payer: COMMERCIAL

## 2023-04-18 NOTE — TELEPHONE ENCOUNTER
Patient Quality Outreach    Patient is due for the following:   Diabetes -  Eye Exam and Foot Exam  Hypertension -  BP check  Physical Preventive Adult Physical      Topic Date Due     Hepatitis B Vaccine (1 of 3 - 3-dose series) Never done     Pneumococcal Vaccine (2 - PCV) 01/30/2016     COVID-19 Vaccine (2 - Booster for Claudine series) 07/01/2021     Diptheria Tetanus Pertussis (DTAP/TDAP/TD) Vaccine (3 - Td or Tdap) 07/30/2022     Flu Vaccine (1) 09/01/2022       Next Steps:   Schedule a Adult Preventative    Type of outreach:    Sent [employer] message.      Questions for provider review:    None     Letty Anguiano MA

## 2023-04-25 ENCOUNTER — VIRTUAL VISIT (OUTPATIENT)
Dept: FAMILY MEDICINE | Facility: CLINIC | Age: 38
End: 2023-04-25
Payer: COMMERCIAL

## 2023-04-25 DIAGNOSIS — R68.82 DECREASED SEX DRIVE: ICD-10-CM

## 2023-04-25 DIAGNOSIS — E11.9 TYPE 2 DIABETES MELLITUS WITHOUT COMPLICATION, WITHOUT LONG-TERM CURRENT USE OF INSULIN (H): ICD-10-CM

## 2023-04-25 DIAGNOSIS — R53.83 FATIGUE, UNSPECIFIED TYPE: Primary | ICD-10-CM

## 2023-04-25 DIAGNOSIS — E11.65 TYPE 2 DIABETES MELLITUS WITH HYPERGLYCEMIA, WITHOUT LONG-TERM CURRENT USE OF INSULIN (H): ICD-10-CM

## 2023-04-25 PROCEDURE — 99213 OFFICE O/P EST LOW 20 MIN: CPT | Mod: VID | Performed by: NURSE PRACTITIONER

## 2023-04-25 NOTE — PROGRESS NOTES
Tio is a 38 year old who is being evaluated via a billable video visit.      How would you like to obtain your AVS? MyChart  If the video visit is dropped, the invitation should be resent by: Text to cell phone: [phone]  Will anyone else be joining your video visit? No      Assessment & Plan     Type 2 diabetes mellitus with hyperglycemia, without long-term current use of insulin (H)    - Adult Eye  Referral; Future  - Lipid panel reflex to direct LDL Non-fasting; Future    Fatigue, unspecified type    - Testosterone Free and Total; Future  - Testosterone Free and Total; Future  - Vitamin D Deficiency; Future  - TSH with free T4 reflex; Future  - CBC with platelets and differential; Future    Decreased sex drive    - Testosterone Free and Total; Future  - Testosterone Free and Total; Future    25 minutes spent by me on the date of the encounter doing chart review, history and exam, documentation and further activities per the note       See Patient Instructions: make 2 lab only appointments. We will let you know results and if abnormal a treatment plan.  Follow-up as needed.  Review after visit summary tips.    MORENO JOINER  [facility] [place]    Akilah Kinsey is a 38 year old, presenting for the following health issues:  Consult        4/25/2023    11:38 AM   Additional Questions   Roomed by Letty   He reports increased fatigue, decreased sex drive he has been talking with friends with similar symptoms and they report history of low testosterone.  Discussed he can make 2 lab appointments usually they need labs done twice for insurance to cover testosterone replacement.  Discussed doing other labs that could be causing fatigue such as low vitamin D or thyroid.  Patient is in agreement to doing those labs as well.  He is having some erectile dysfunction but does not want to start medication for that at this time we will await lab results.  His mental health is  doing well he has no other concerns at this time.  Discussed review after visit summary tips.  We are here as needed for healthcare questions or concerns.  Patient in agreement.      History of Present Illness       Reason for visit:  Low energy. Muscle tiredness. Tired.  Symptom onset:  More than a month  Symptom intensity:  Moderate  Symptom progression:  Staying the same  Had these symptoms before:  No    He eats 2-3 servings of fruits and vegetables daily.He consumes 0 sweetened beverage(s) daily.He exercises with enough effort to increase his heart rate 20 to 29 minutes per day.  He exercises with enough effort to increase his heart rate 4 days per week.   He is taking medications regularly.         Review of Systems   Constitutional, HEENT, cardiovascular, pulmonary, GI, , musculoskeletal, neuro, skin, endocrine and psych systems are negative, except as otherwise noted.      Objective           Vitals:  No vitals were obtained today due to virtual visit.    Physical Exam   GENERAL: Healthy, alert and no distress  EYES: Eyes grossly normal to inspection.  No discharge or erythema, or obvious scleral/conjunctival abnormalities.  RESP: No audible wheeze, cough, or visible cyanosis.  No visible retractions or increased work of breathing.    SKIN: Visible skin clear. No significant rash, abnormal pigmentation or lesions.  NEURO: Cranial nerves grossly intact.  Mentation and speech appropriate for age.  PSYCH: Mentation appears normal, affect normal/bright, judgement and insight intact, normal speech and appearance well-groomed.    See orders        Video-Visit Details    Type of service:  Video Visit     Originating Location (pt. Location): Other car  Distant Location (provider location):  On-site  Platform used for Video Visit: [employer]

## 2023-04-27 ENCOUNTER — LAB (OUTPATIENT)
Dept: LAB | Facility: CLINIC | Age: 38
End: 2023-04-27
Payer: COMMERCIAL

## 2023-04-27 DIAGNOSIS — R53.83 FATIGUE, UNSPECIFIED TYPE: ICD-10-CM

## 2023-04-27 DIAGNOSIS — R68.82 DECREASED SEX DRIVE: ICD-10-CM

## 2023-04-27 DIAGNOSIS — E11.65 TYPE 2 DIABETES MELLITUS WITH HYPERGLYCEMIA, WITHOUT LONG-TERM CURRENT USE OF INSULIN (H): ICD-10-CM

## 2023-04-27 LAB
BASOPHILS # BLD AUTO: 0.1 10E3/UL (ref 0–0.2)
BASOPHILS NFR BLD AUTO: 1 %
CHOLEST SERPL-MCNC: 178 MG/DL
EOSINOPHIL # BLD AUTO: 0.3 10E3/UL (ref 0–0.7)
EOSINOPHIL NFR BLD AUTO: 4 %
ERYTHROCYTE [DISTWIDTH] IN BLOOD BY AUTOMATED COUNT: 12.8 % (ref 10–15)
FASTING STATUS PATIENT QL REPORTED: NO
HCT VFR BLD AUTO: 44 % (ref 40–53)
HDLC SERPL-MCNC: 40 MG/DL
HGB BLD-MCNC: 15 G/DL (ref 13.3–17.7)
IMM GRANULOCYTES # BLD: 0 10E3/UL
IMM GRANULOCYTES NFR BLD: 0 %
LDLC SERPL CALC-MCNC: 90 MG/DL
LYMPHOCYTES # BLD AUTO: 2.9 10E3/UL (ref 0.8–5.3)
LYMPHOCYTES NFR BLD AUTO: 34 %
MCH RBC QN AUTO: 28.9 PG (ref 26.5–33)
MCHC RBC AUTO-ENTMCNC: 34.1 G/DL (ref 31.5–36.5)
MCV RBC AUTO: 85 FL (ref 78–100)
MONOCYTES # BLD AUTO: 0.6 10E3/UL (ref 0–1.3)
MONOCYTES NFR BLD AUTO: 7 %
NEUTROPHILS # BLD AUTO: 4.6 10E3/UL (ref 1.6–8.3)
NEUTROPHILS NFR BLD AUTO: 54 %
NONHDLC SERPL-MCNC: 138 MG/DL
NRBC # BLD AUTO: 0 10E3/UL
NRBC BLD AUTO-RTO: 0 /100
PLATELET # BLD AUTO: 252 10E3/UL (ref 150–450)
RBC # BLD AUTO: 5.19 10E6/UL (ref 4.4–5.9)
TRIGL SERPL-MCNC: 238 MG/DL
TSH SERPL DL<=0.005 MIU/L-ACNC: 1.49 MU/L (ref 0.4–4)
WBC # BLD AUTO: 8.5 10E3/UL (ref 4–11)

## 2023-04-27 PROCEDURE — 84403 ASSAY OF TOTAL TESTOSTERONE: CPT

## 2023-04-27 PROCEDURE — 36415 COLL VENOUS BLD VENIPUNCTURE: CPT

## 2023-04-27 PROCEDURE — 80061 LIPID PANEL: CPT

## 2023-04-27 PROCEDURE — 82306 VITAMIN D 25 HYDROXY: CPT

## 2023-04-27 PROCEDURE — 84270 ASSAY OF SEX HORMONE GLOBUL: CPT

## 2023-04-27 PROCEDURE — 85025 COMPLETE CBC W/AUTO DIFF WBC: CPT

## 2023-04-27 PROCEDURE — 84443 ASSAY THYROID STIM HORMONE: CPT

## 2023-04-28 LAB
DEPRECATED CALCIDIOL+CALCIFEROL SERPL-MC: 20 UG/L (ref 20–75)
SHBG SERPL-SCNC: 22 NMOL/L (ref 11–80)

## 2023-04-30 DIAGNOSIS — E11.9 TYPE 2 DIABETES MELLITUS WITHOUT COMPLICATION, WITHOUT LONG-TERM CURRENT USE OF INSULIN (H): ICD-10-CM

## 2023-05-01 LAB
TESTOST FREE SERPL-MCNC: 5.62 NG/DL
TESTOST SERPL-MCNC: 235 NG/DL (ref 240–950)

## 2023-05-01 RX ORDER — SEMAGLUTIDE 1.34 MG/ML
INJECTION, SOLUTION SUBCUTANEOUS
Qty: 3 ML | Refills: 1 | Status: SHIPPED | OUTPATIENT
Start: 2023-05-01 | End: 2023-06-23

## 2023-05-02 ENCOUNTER — LAB (OUTPATIENT)
Dept: LAB | Facility: CLINIC | Age: 38
End: 2023-05-02
Payer: COMMERCIAL

## 2023-05-02 DIAGNOSIS — R68.82 DECREASED SEX DRIVE: ICD-10-CM

## 2023-05-02 DIAGNOSIS — R53.83 FATIGUE, UNSPECIFIED TYPE: ICD-10-CM

## 2023-05-02 PROCEDURE — 36415 COLL VENOUS BLD VENIPUNCTURE: CPT

## 2023-05-02 PROCEDURE — 84270 ASSAY OF SEX HORMONE GLOBUL: CPT

## 2023-05-02 PROCEDURE — 84403 ASSAY OF TOTAL TESTOSTERONE: CPT

## 2023-05-03 ENCOUNTER — MYC MEDICAL ADVICE (OUTPATIENT)
Dept: FAMILY MEDICINE | Facility: CLINIC | Age: 38
End: 2023-05-03
Payer: COMMERCIAL

## 2023-05-03 DIAGNOSIS — R79.89 LOW VITAMIN D LEVEL: ICD-10-CM

## 2023-05-03 DIAGNOSIS — E29.1 HYPOGONADISM MALE: ICD-10-CM

## 2023-05-03 DIAGNOSIS — R53.83 FATIGUE, UNSPECIFIED TYPE: Primary | ICD-10-CM

## 2023-05-03 LAB — SHBG SERPL-SCNC: 19 NMOL/L (ref 11–80)

## 2023-05-03 RX ORDER — TESTOSTERONE CYPIONATE 200 MG/ML
100 INJECTION, SOLUTION INTRAMUSCULAR WEEKLY
Qty: 10 ML | Refills: 1 | Status: SHIPPED | OUTPATIENT
Start: 2023-05-03 | End: 2023-11-08

## 2023-05-03 NOTE — RESULT ENCOUNTER NOTE
Rafy Kinsey,    Thank you for your recent office visit.    Here are your recent results.  Your testosterone total is low.  Your vitamin D is on the low end.  Your thyroid and CBC are normal.  Your nonfasting cholesterol is normal.  Sometimes insurance requires to low testosterone levels.  I will put in another lab order, you can make another lab appointment AND/OR I will send in testosterone for you and a vitamin D supplement.  Do you prefer a once a week injectable testosterone, testosterone gel or other form of testosterone?    Feel free to contact me via [employer] or call the clinic at [phone].    Sincerely,    MARLY Dooley, FNP-BC

## 2023-05-03 NOTE — TELEPHONE ENCOUNTER
"See [employer] message 5/3/23.    Pt responding to pcp (Danyell) per question from lab result encounter note 4/27/23: \"Do you prefer a once a week injectable testosterone, testosterone gel or other form of testosterone?\"    Routing to pcp to advise.    Lizeth Momin RN    "

## 2023-05-04 ENCOUNTER — TELEPHONE (OUTPATIENT)
Dept: FAMILY MEDICINE | Facility: CLINIC | Age: 38
End: 2023-05-04
Payer: COMMERCIAL

## 2023-05-04 LAB
TESTOST FREE SERPL-MCNC: 5.67 NG/DL
TESTOST SERPL-MCNC: 223 NG/DL (ref 240–950)

## 2023-05-04 NOTE — RESULT ENCOUNTER NOTE
Rafy Kinsey,    Thank you for your recent office visit.    Here are your recent results.  Repeat testosterone is low as well.  I have sent in the testosterone for you at this time I hope there are no coverage issues.  Please let us know if you have issues.  We often recheck testosterone levels in 3 to 6 months.  Please let me know if you want labs reordered.    Feel free to contact me via [employer] or call the clinic at [phone].    Sincerely,    MARLY Dooley, FNP-BC

## 2023-05-04 NOTE — TELEPHONE ENCOUNTER
Prior Authorization Retail Medication Request    Medication/Dose: testosterone cypionate (DEPOTESTOSTERONE) 200 MG/ML injection  ICD code (if different than what is on RX):  R53.83/E29.1  Previously Tried and Failed:  na  Rationale:  na    Insurance Name:  [employer]  Insurance ID:  [id number]      Pharmacy Information (if different than what is on RX)  Name:  [facility]   Phone:  [phone]

## 2023-05-08 NOTE — TELEPHONE ENCOUNTER
Can you please do a prior Auth for the testosterone he has had 2 low testosterone levels and it should be covered. MARLY Dooley, FNP-BC

## 2023-05-10 NOTE — TELEPHONE ENCOUNTER
Central Prior Authorization Team   Phone: [phone]    PA Initiation    Medication: testosterone cypionate (DEPOTESTOSTERONE) 200 MG/ML injection  Insurance Company: [employer] - Phone [phone] Fax [phone]  Pharmacy Filling the Rx: [facility] - [address]  Filling Pharmacy Phone: [phone]  Filling Pharmacy Fax:    Start Date: 5/10/2023

## 2023-05-11 NOTE — TELEPHONE ENCOUNTER
Prior Authorization Approval    Authorization Effective Date: 4/10/2023  Authorization Expiration Date: 5/8/2028  Medication: testosterone cypionate (DEPOTESTOSTERONE) 200 MG/ML injection  Approved Dose/Quantity:    Reference #:     Insurance Company: [employer] - Phone [phone] Fax [phone]  Expected CoPay:       CoPay Card Available:      Foundation Assistance Needed:    Which Pharmacy is filling the prescription (Not needed for infusion/clinic administered): [facility] [address]  Pharmacy Notified: Yes  Patient Notified: Comment:  Pharmacy will notify patient

## 2023-06-03 ENCOUNTER — HEALTH MAINTENANCE LETTER (OUTPATIENT)
Age: 38
End: 2023-06-03

## 2023-06-23 DIAGNOSIS — E78.5 HYPERLIPIDEMIA LDL GOAL <100: ICD-10-CM

## 2023-06-23 DIAGNOSIS — E11.9 TYPE 2 DIABETES MELLITUS WITHOUT COMPLICATION, WITHOUT LONG-TERM CURRENT USE OF INSULIN (H): ICD-10-CM

## 2023-06-23 RX ORDER — ATORVASTATIN CALCIUM 20 MG/1
TABLET, FILM COATED ORAL
Qty: 90 TABLET | Refills: 0 | Status: SHIPPED | OUTPATIENT
Start: 2023-06-23 | End: 2023-10-09

## 2023-06-23 RX ORDER — SEMAGLUTIDE 1.34 MG/ML
INJECTION, SOLUTION SUBCUTANEOUS
Qty: 3 ML | Refills: 1 | Status: SHIPPED | OUTPATIENT
Start: 2023-06-23 | End: 2023-08-07

## 2023-08-07 DIAGNOSIS — R53.83 FATIGUE, UNSPECIFIED TYPE: ICD-10-CM

## 2023-08-07 DIAGNOSIS — R79.89 LOW VITAMIN D LEVEL: ICD-10-CM

## 2023-08-07 DIAGNOSIS — I10 BENIGN ESSENTIAL HYPERTENSION: ICD-10-CM

## 2023-08-07 DIAGNOSIS — E11.9 TYPE 2 DIABETES MELLITUS WITHOUT COMPLICATION, WITHOUT LONG-TERM CURRENT USE OF INSULIN (H): ICD-10-CM

## 2023-08-07 RX ORDER — CHOLECALCIFEROL (VITAMIN D3) 1250 MCG
1250 CAPSULE ORAL
Qty: 7 CAPSULE | Refills: 0 | Status: SHIPPED | OUTPATIENT
Start: 2023-08-07 | End: 2023-10-09

## 2023-08-07 RX ORDER — AMLODIPINE BESYLATE 2.5 MG/1
2.5 TABLET ORAL DAILY
Qty: 90 TABLET | Refills: 1 | Status: SHIPPED | OUTPATIENT
Start: 2023-08-07 | End: 2023-12-04

## 2023-08-07 RX ORDER — SEMAGLUTIDE 1.34 MG/ML
INJECTION, SOLUTION SUBCUTANEOUS
Qty: 3 ML | Refills: 1 | Status: SHIPPED | OUTPATIENT
Start: 2023-08-07 | End: 2023-09-11

## 2023-08-21 ENCOUNTER — TELEPHONE (OUTPATIENT)
Dept: FAMILY MEDICINE | Facility: CLINIC | Age: 38
End: 2023-08-21
Payer: COMMERCIAL

## 2023-08-21 NOTE — TELEPHONE ENCOUNTER
Patient Quality Outreach    Patient is due for the following:   Diabetes -  A1C, Eye Exam, and Foot Exam  Asthma  -  ACT needed and AAP  Physical Preventive Adult Physical      Topic Date Due    Hepatitis B Vaccine (1 of 3 - 3-dose series) Never done    Pneumococcal Vaccine (2 - PCV) 01/30/2016    COVID-19 Vaccine (2 - Booster for Claudine series) 07/01/2021    Diptheria Tetanus Pertussis (DTAP/TDAP/TD) Vaccine (3 - Td or Tdap) 07/30/2022       Next Steps:   Schedule a Adult Preventative  Patient was assigned appropriate questionnaire to complete    Type of outreach:    Sent [employer] message.      Questions for provider review:    None           Letty Anguiano MA

## 2023-08-21 NOTE — LETTER
My Asthma Action Plan    Name: Tio Merino   YOB: 1985  Date: 8/21/2023   My doctor: PETER GRAF NP   My clinic: [facility]        My Control Medicine: Fluticasone propionate + salmeterol (Advair Diskus or Wixela Inhub) -  100/50 mcg Inhale 1 puff into the lungs every 12 hours  My Rescue Medicine: Albuterol (Proair/Ventolin/Proventil HFA) 2-4 puffs EVERY 4 HOURS as needed. Use a spacer if recommended by your provider.  Levalbuterol (Xopenex HFA) Inhale 1-2 puffs into the lungs every 4 hours as needed for shortness of breath / dyspnea  My Oral Steroid Medicine: n/a My Asthma Severity:   Moderate Persistent  Know your asthma triggers:  Patient aware of triggers.                GREEN ZONE   Good Control  I feel good  No cough or wheeze  Can work, sleep and play without asthma symptoms       Take your asthma control medicine every day.     If exercise triggers your asthma, take your rescue medication  15 minutes before exercise or sports, and  During exercise if you have asthma symptoms  Spacer to use with inhaler: If you have a spacer, make sure to use it with your inhaler             YELLOW ZONE Getting Worse  I have ANY of these:  I do not feel good  Cough or wheeze  Chest feels tight  Wake up at night   Keep taking your Green Zone medications  Start taking your rescue medicine:  every 20 minutes for up to 1 hour. Then every 4 hours for 24-48 hours.  If you stay in the Yellow Zone for more than 12-24 hours, contact your doctor.  If you do not return to the Green Zone in 12-24 hours or you get worse, start taking your oral steroid medicine if prescribed by your provider.           RED ZONE Medical Alert - Get Help  I have ANY of these:  I feel awful  Medicine is not helping  Breathing getting harder  Trouble walking or talking  Nose opens wide to breathe       Take your rescue medicine NOW  If your provider has prescribed an oral steroid medicine, start taking it  NOW  Call your doctor NOW  If you are still in the Red Zone after 20 minutes and you have not reached your doctor:  Take your rescue medicine again and  Call 911 or go to the emergency room right away    See your regular doctor within 2 weeks of an Emergency Room or Urgent Care visit for follow-up treatment.          Annual Reminders:  Meet with Asthma Educator,  Flu Shot in the Fall, consider Pneumonia Vaccination for patients with asthma (aged 19 and older).    Pharmacy:    [facility] [place], [place] - [address] [facility] - [place] - [address] [place], [place] - [address]    Electronically signed by PETER GRAF NP   Date: 08/21/23                      Asthma Triggers  How To Control Things That Make Your Asthma Worse    Triggers are things that make your asthma worse.  Look at the list below to help you find your triggers and what you can do about them.  You can help prevent asthma flare-ups by staying away from your triggers.      Trigger                                                          What you can do   Cigarette Smoke  Tobacco smoke can make asthma worse. Do not allow smoking in your home, car or around you.  Be sure no one smokes at a child s day care or school.  If you smoke, ask your health care provider for ways to help you quit.  Ask family members to quit too.  Ask your health care provider for a referral to Quit Plan to help you quit smoking, or call 6-251-323-PLAN.     Colds, Flu, Bronchitis  These are common triggers of asthma. Wash your hands often.  Don t touch your eyes, nose or mouth.  Get a flu shot every year.     Dust Mites  These are tiny bugs that live in cloth or carpet. They are too small to see. Wash sheets and blankets in hot water every week.   Encase pillows and mattress in dust mite proof covers.  Avoid having carpet if you can. If you have carpet, vacuum weekly.   Use a  dust mask and HEPA vacuum.   Pollen and Outdoor Mold  Some people are allergic to trees, grass, or weed pollen, or molds. Try to keep your windows closed.  Limit time out doors when pollen count is high.   Ask you health care provider about taking medicine during allergy season.     Animal Dander  Some people are allergic to skin flakes, urine or saliva from pets with fur or feathers. Keep pets with fur or feathers out of your home.    If you can t keep the pet outdoors, then keep the pet out of your bedroom.  Keep the bedroom door closed.  Keep pets off cloth furniture and away from stuffed toys.     Mice, Rats, and Cockroaches   Some people are allergic to the waste from these pests.   Cover food and garbage.  Clean up spills and food crumbs.  Store grease in the refrigerator.   Keep food out of the bedroom.   Indoor Mold  This can be a trigger if your home has high moisture. Fix leaking faucets, pipes, or other sources of water.   Clean moldy surfaces.  Dehumidify basement if it is damp and smelly.   Smoke, Strong Odors, and Sprays  These can reduce air quality. Stay away from strong odors and sprays, such as perfume, powder, hair spray, paints, smoke incense, paint, cleaning products, candles and new carpet.   Exercise or Sports  Some people with asthma have this trigger. Be active!  Ask your doctor about taking medicine before sports or exercise to prevent symptoms.    Warm up for 5-10 minutes before and after sports or exercise.     Other Triggers of Asthma  Cold air:  Cover your nose and mouth with a scarf.  Sometimes laughing or crying can be a trigger.  Some medicines and food can trigger asthma.

## 2023-08-23 DIAGNOSIS — J45.40 MODERATE PERSISTENT ASTHMA WITHOUT COMPLICATION: ICD-10-CM

## 2023-08-23 RX ORDER — LEVALBUTEROL TARTRATE 45 UG/1
AEROSOL, METERED ORAL
Qty: 45 G | Refills: 0 | Status: SHIPPED | OUTPATIENT
Start: 2023-08-23 | End: 2023-12-04

## 2023-08-31 DIAGNOSIS — E11.9 TYPE 2 DIABETES MELLITUS WITHOUT COMPLICATION, WITHOUT LONG-TERM CURRENT USE OF INSULIN (H): ICD-10-CM

## 2023-09-06 RX ORDER — METFORMIN HCL 500 MG
TABLET, EXTENDED RELEASE 24 HR ORAL
Qty: 180 TABLET | Refills: 0 | Status: SHIPPED | OUTPATIENT
Start: 2023-09-06 | End: 2023-12-04

## 2023-09-10 DIAGNOSIS — E11.9 TYPE 2 DIABETES MELLITUS WITHOUT COMPLICATION, WITHOUT LONG-TERM CURRENT USE OF INSULIN (H): ICD-10-CM

## 2023-09-11 RX ORDER — SEMAGLUTIDE 1.34 MG/ML
1 INJECTION, SOLUTION SUBCUTANEOUS
Qty: 3 ML | Refills: 0 | Status: SHIPPED | OUTPATIENT
Start: 2023-09-11 | End: 2023-10-09

## 2023-09-30 ENCOUNTER — HEALTH MAINTENANCE LETTER (OUTPATIENT)
Age: 38
End: 2023-09-30

## 2023-10-06 DIAGNOSIS — R79.89 LOW VITAMIN D LEVEL: ICD-10-CM

## 2023-10-06 DIAGNOSIS — E78.5 HYPERLIPIDEMIA LDL GOAL <100: ICD-10-CM

## 2023-10-06 DIAGNOSIS — R53.83 FATIGUE, UNSPECIFIED TYPE: ICD-10-CM

## 2023-10-06 DIAGNOSIS — E11.9 TYPE 2 DIABETES MELLITUS WITHOUT COMPLICATION, WITHOUT LONG-TERM CURRENT USE OF INSULIN (H): ICD-10-CM

## 2023-10-06 DIAGNOSIS — I10 BENIGN ESSENTIAL HYPERTENSION: ICD-10-CM

## 2023-10-09 RX ORDER — CHOLECALCIFEROL (VITAMIN D3) 1250 MCG
CAPSULE ORAL
Qty: 7 CAPSULE | Refills: 0 | Status: SHIPPED | OUTPATIENT
Start: 2023-10-09 | End: 2023-11-02

## 2023-10-09 RX ORDER — GLIPIZIDE 10 MG/1
10 TABLET, FILM COATED, EXTENDED RELEASE ORAL DAILY
Qty: 90 TABLET | Refills: 1 | Status: SHIPPED | OUTPATIENT
Start: 2023-10-09 | End: 2023-12-04

## 2023-10-09 RX ORDER — SEMAGLUTIDE 1.34 MG/ML
INJECTION, SOLUTION SUBCUTANEOUS
Qty: 3 ML | Refills: 0 | Status: SHIPPED | OUTPATIENT
Start: 2023-10-09 | End: 2023-11-08

## 2023-10-09 RX ORDER — HYDROCHLOROTHIAZIDE 25 MG/1
25 TABLET ORAL DAILY
Qty: 90 TABLET | Refills: 1 | Status: SHIPPED | OUTPATIENT
Start: 2023-10-09 | End: 2023-12-04

## 2023-10-09 RX ORDER — ATORVASTATIN CALCIUM 20 MG/1
TABLET, FILM COATED ORAL
Qty: 90 TABLET | Refills: 0 | Status: SHIPPED | OUTPATIENT
Start: 2023-10-09 | End: 2023-12-04

## 2023-11-02 DIAGNOSIS — R79.89 LOW VITAMIN D LEVEL: ICD-10-CM

## 2023-11-02 DIAGNOSIS — R53.83 FATIGUE, UNSPECIFIED TYPE: ICD-10-CM

## 2023-11-02 RX ORDER — CHOLECALCIFEROL (VITAMIN D3) 1250 MCG
1250 CAPSULE ORAL
Qty: 7 CAPSULE | Refills: 0 | Status: SHIPPED | OUTPATIENT
Start: 2023-11-02 | End: 2024-06-12

## 2023-11-21 ENCOUNTER — MYC REFILL (OUTPATIENT)
Dept: FAMILY MEDICINE | Facility: CLINIC | Age: 38
End: 2023-11-21
Payer: COMMERCIAL

## 2023-11-21 DIAGNOSIS — R53.83 FATIGUE, UNSPECIFIED TYPE: ICD-10-CM

## 2023-11-21 DIAGNOSIS — E29.1 HYPOGONADISM MALE: ICD-10-CM

## 2023-11-22 RX ORDER — TESTOSTERONE CYPIONATE 200 MG/ML
INJECTION, SOLUTION INTRAMUSCULAR
Qty: 1 ML | Refills: 0 | OUTPATIENT
Start: 2023-11-22

## 2023-11-22 RX ORDER — TESTOSTERONE CYPIONATE 200 MG/ML
100 INJECTION, SOLUTION INTRAMUSCULAR WEEKLY
Qty: 1 ML | Refills: 0 | OUTPATIENT
Start: 2023-11-22

## 2023-11-22 NOTE — TELEPHONE ENCOUNTER
Needs follow up visit for controlled medication refill. Can be mychart evisit addressed today, virtual (phone/video) or in person. Please let him know. Thanks. MARLY Dooley, FNP-BC

## 2023-12-04 ENCOUNTER — VIRTUAL VISIT (OUTPATIENT)
Dept: FAMILY MEDICINE | Facility: CLINIC | Age: 38
End: 2023-12-04
Payer: COMMERCIAL

## 2023-12-04 DIAGNOSIS — E29.1 HYPOGONADISM MALE: ICD-10-CM

## 2023-12-04 DIAGNOSIS — I10 BENIGN ESSENTIAL HYPERTENSION: ICD-10-CM

## 2023-12-04 DIAGNOSIS — R53.83 FATIGUE, UNSPECIFIED TYPE: Primary | ICD-10-CM

## 2023-12-04 DIAGNOSIS — E11.9 TYPE 2 DIABETES MELLITUS WITHOUT COMPLICATION, WITHOUT LONG-TERM CURRENT USE OF INSULIN (H): ICD-10-CM

## 2023-12-04 DIAGNOSIS — J45.40 MODERATE PERSISTENT ASTHMA WITHOUT COMPLICATION: ICD-10-CM

## 2023-12-04 DIAGNOSIS — E78.5 HYPERLIPIDEMIA LDL GOAL <100: ICD-10-CM

## 2023-12-04 PROCEDURE — 99214 OFFICE O/P EST MOD 30 MIN: CPT | Mod: VID | Performed by: NURSE PRACTITIONER

## 2023-12-04 RX ORDER — GLIPIZIDE 10 MG/1
10 TABLET, FILM COATED, EXTENDED RELEASE ORAL DAILY
Qty: 90 TABLET | Refills: 1 | Status: SHIPPED | OUTPATIENT
Start: 2023-12-04 | End: 2024-06-10

## 2023-12-04 RX ORDER — HYDROCHLOROTHIAZIDE 25 MG/1
25 TABLET ORAL DAILY
Qty: 90 TABLET | Refills: 1 | Status: SHIPPED | OUTPATIENT
Start: 2023-12-04 | End: 2024-06-10

## 2023-12-04 RX ORDER — AMLODIPINE BESYLATE 2.5 MG/1
2.5 TABLET ORAL DAILY
Qty: 90 TABLET | Refills: 1 | Status: SHIPPED | OUTPATIENT
Start: 2023-12-04 | End: 2024-08-08

## 2023-12-04 RX ORDER — METFORMIN HCL 500 MG
TABLET, EXTENDED RELEASE 24 HR ORAL
Qty: 180 TABLET | Refills: 1 | Status: SHIPPED | OUTPATIENT
Start: 2023-12-04 | End: 2024-06-10

## 2023-12-04 RX ORDER — FLUTICASONE PROPIONATE AND SALMETEROL 100; 50 UG/1; UG/1
1 POWDER RESPIRATORY (INHALATION) EVERY 12 HOURS
Qty: 3 EACH | Refills: 1 | Status: SHIPPED | OUTPATIENT
Start: 2023-12-04 | End: 2024-08-08

## 2023-12-04 RX ORDER — TESTOSTERONE CYPIONATE 200 MG/ML
100 INJECTION, SOLUTION INTRAMUSCULAR WEEKLY
Qty: 6 ML | Refills: 1 | Status: SHIPPED | OUTPATIENT
Start: 2023-12-04 | End: 2024-08-08

## 2023-12-04 RX ORDER — SEMAGLUTIDE 1.34 MG/ML
INJECTION, SOLUTION SUBCUTANEOUS
Qty: 9 ML | Refills: 1 | Status: SHIPPED | OUTPATIENT
Start: 2023-12-04 | End: 2024-06-10

## 2023-12-04 RX ORDER — ATORVASTATIN CALCIUM 20 MG/1
20 TABLET, FILM COATED ORAL DAILY
Qty: 90 TABLET | Refills: 1 | Status: SHIPPED | OUTPATIENT
Start: 2023-12-04 | End: 2024-06-10

## 2023-12-04 RX ORDER — LEVALBUTEROL TARTRATE 45 UG/1
AEROSOL, METERED ORAL
Qty: 45 G | Refills: 1 | Status: SHIPPED | OUTPATIENT
Start: 2023-12-04 | End: 2024-06-11

## 2023-12-04 ASSESSMENT — ASTHMA QUESTIONNAIRES: ACT_TOTALSCORE: 19

## 2023-12-04 NOTE — PROGRESS NOTES
Tio is a 38 year old who is being evaluated via a billable video visit.      How would you like to obtain your AVS? MyChart  If the video visit is dropped, the invitation should be resent by: Text to cell phone: [phone]  Will anyone else be joining your video visit? No          Assessment & Plan     Fatigue, unspecified type    - testosterone cypionate (DEPOTESTOSTERONE) 200 MG/ML injection; Inject 0.5 mLs (100 mg) into the muscle once a week  - Testosterone Free and Total; Future    Hypogonadism male    - testosterone cypionate (DEPOTESTOSTERONE) 200 MG/ML injection; Inject 0.5 mLs (100 mg) into the muscle once a week  - Testosterone Free and Total; Future    Type 2 diabetes mellitus without complication, without long-term current use of insulin (H)    - Adult Eye  Referral; Future  - Semaglutide, 1 MG/DOSE, (OZEMPIC, 1 MG/DOSE,) 4 MG/3ML pen; INJECT 1MG UNDER THE SKIN EVERY 7 DAYS  - metFORMIN (GLUCOPHAGE XR) 500 MG 24 hr tablet; TAKE TWO TABLETS BY MOUTH EVERY DAY WITH SUPPER  - glipiZIDE (GLUCOTROL XL) 10 MG 24 hr tablet; Take 1 tablet (10 mg) by mouth daily  - Hemoglobin A1c; Future  - Renal panel (Alb, BUN, Ca, Cl, CO2, Creat, Gluc, Phos, K, Na); Future    Moderate persistent asthma without complication    - levalbuterol (XOPENEX HFA) 45 MCG/ACT inhaler; INHALE 1 TO 2 PUFFS BY MOUTH EVERY 4 HOURS AS NEEDED FOR SHORTNESS OF BREATH /DYSPNEA  - fluticasone-salmeterol (ADVAIR DISKUS) 100-50 MCG/ACT inhaler; Inhale 1 puff into the lungs every 12 hours    Benign essential hypertension    - hydrochlorothiazide (HYDRODIURIL) 25 MG tablet; Take 1 tablet (25 mg) by mouth daily  - amLODIPine (NORVASC) 2.5 MG tablet; Take 1 tablet (2.5 mg) by mouth daily  - Renal panel (Alb, BUN, Ca, Cl, CO2, Creat, Gluc, Phos, K, Na); Future    Hyperlipidemia LDL goal <100    - atorvastatin (LIPITOR) 20 MG tablet; Take 1 tablet (20 mg) by mouth daily  - Lipid panel reflex to direct LDL Fasting; Future    Review of  external notes as documented elsewhere in note  Ordering of each unique test  Prescription drug management  25 minutes spent by me on the date of the encounter doing chart review, history and exam, documentation and further activities per the note       Work on weight loss  Regular exercise  See Patient Instructions    MORENO JOINER  [facility] [place] Kinsey is a 38 year old, presenting for the following health issues:  Refill Request        12/4/2023    10:30 AM   Additional Questions   Roomed by Fredo     He reports he is tolerating the Ozempic well, his blood sugars have been 120-160 after eating.  He is tolerating his metformin and glipizide.  His blood pressures have been normal when checked, he denies symptoms of high blood pressure including chest pain, dizziness, frequent headaches.  Tips given on after visit summary.  He reports his mental health is good, improved with weight loss as a side effect from Ozempic.  He denies stomach side effects from Ozempic.  Discussed need for blood labs.  He feels testosterone is helping his energy level he does feel it wear off by day 5 or 6 and then improves with his next injection.  Work is going well.  No breathing issues.  He would like his refill sent in for 3 months at a time.  Discussed need for labs and follow-up every 6 months.  Tolerating his cholesterol medicine.  Due for diabetic eye exam.  No other questions or concerns at this time.    History of Present Illness       Diabetes:   He presents for follow up of diabetes.  He is checking home blood glucose two times daily.   He checks blood glucose before meals.  Blood glucose is never over 200 and never under 70. He is aware of hypoglycemia symptoms including none.    He has no concerns regarding his diabetes at this time.   He is not experiencing numbness or burning in feet, excessive thirst, blurry vision, weight changes or redness, sores or blisters on feet. The  patient has not had a diabetic eye exam in the last 12 months.          He eats 2-3 servings of fruits and vegetables daily.He consumes 0 sweetened beverage(s) daily.He exercises with enough effort to increase his heart rate 30 to 60 minutes per day.  He exercises with enough effort to increase his heart rate 3 or less days per week.   He is taking medications regularly.           Review of Systems   Constitutional, HEENT, cardiovascular, pulmonary, GI, , musculoskeletal, neuro, skin, endocrine and psych systems are negative, except as otherwise noted.      Objective           Vitals:  No vitals were obtained today due to virtual visit.    Physical Exam   GENERAL: Healthy, alert and no distress  EYES: Eyes grossly normal to inspection.  No discharge or erythema, or obvious scleral/conjunctival abnormalities.  RESP: No audible wheeze, cough, or visible cyanosis.  No visible retractions or increased work of breathing.    SKIN: Visible skin clear. No significant rash, abnormal pigmentation or lesions.  NEURO: Cranial nerves grossly intact.  Mentation and speech appropriate for age.  PSYCH: Mentation appears normal, affect normal/bright, judgement and insight intact, normal speech and appearance well-groomed.    See orders        Video-Visit Details    Type of service:  Video Visit     Originating Location (pt. Location): Home    Distant Location (provider location):  Off-site  Platform used for Video Visit: [employer]

## 2024-02-17 ENCOUNTER — HEALTH MAINTENANCE LETTER (OUTPATIENT)
Age: 39
End: 2024-02-17

## 2024-02-24 ENCOUNTER — E-VISIT (OUTPATIENT)
Dept: URGENT CARE | Facility: CLINIC | Age: 39
End: 2024-02-24
Payer: COMMERCIAL

## 2024-02-24 DIAGNOSIS — J02.9 SORE THROAT: Primary | ICD-10-CM

## 2024-02-24 PROCEDURE — 99207 PR NON-BILLABLE SERV PER CHARTING: CPT | Performed by: NURSE PRACTITIONER

## 2024-02-24 NOTE — PATIENT INSTRUCTIONS
Dear Tio,    After reviewing your responses, I would like you to come in for a swab to make sure we treat you correctly. This swab is to evaluate you for possible Strep Throat, and should be scheduled for today or tomorrow. Please use the Schedule Now button in [employer] to schedule your swab. Otherwise, click this link to schedule a lab only appointment.    Lab appointments are not available at most locations on the weekends. If no Lab Only appointment is available, you should be seen in any of our convenient Urgent Care Centers for an in person visit, which can be found on our website here.    You will receive instructions with your results in PopJax once they are available.     If your symptoms worsen, you develop difficulty breathing, difficulty with drinking enough to stay hydrated, difficulty swallowing your saliva or have fevers for more than 5 days, please contact your primary care provider for an appointment or visit an Urgent Care Center to be seen.      Thanks again for choosing us as your health care partner.   MARLY Morgan CNP  Sore Throat: Care Instructions  Overview     Infection by bacteria or a virus causes most sore throats. Cigarette smoke, dry air, air pollution, allergies, and yelling can also cause a sore throat. Sore throats can be painful and annoying. Fortunately, most sore throats go away on their own. If you have a bacterial infection, your doctor may prescribe antibiotics.  Follow-up care is a key part of your treatment and safety. Be sure to make and go to all appointments, and call your doctor if you are having problems. It's also a good idea to know your test results and keep a list of the medicines you take.  How can you care for yourself at home?  If your doctor prescribed antibiotics, take them as directed. Do not stop taking them just because you feel better. You need to take the full course of antibiotics.  Gargle with warm salt water several times a day to help  "reduce swelling and relieve pain. Mix 1/2 teaspoon of salt in 1 cup of warm water.  Take an over-the-counter pain medicine, such as acetaminophen (Tylenol), ibuprofen (Advil, Motrin), or naproxen (Aleve). Read and follow all instructions on the label.  Be careful when taking over-the-counter cold or flu medicines and Tylenol at the same time. Many of these medicines have acetaminophen, which is Tylenol. Read the labels to make sure that you are not taking more than the recommended dose. Too much acetaminophen (Tylenol) can be harmful.  Drink plenty of fluids. Fluids may help soothe an irritated throat. Hot fluids, such as tea or soup, may help decrease throat pain.  Use over-the-counter throat lozenges to soothe pain. Regular cough drops or hard candy may also help. These should not be given to young children because of the risk of choking.  Do not smoke or allow others to smoke around you. If you need help quitting, talk to your doctor about stop-smoking programs and medicines. These can increase your chances of quitting for good.  Use a vaporizer or humidifier to add moisture to your bedroom. Follow the directions for cleaning the machine.  When should you call for help?   Call your doctor now or seek immediate medical care if:    You have trouble breathing.     Your sore throat gets much worse on one side.     You have new or worse trouble swallowing.     You have a new or higher fever.   Watch closely for changes in your health, and be sure to contact your doctor if you do not get better as expected.  Where can you learn more?  Go to https://www.OKpanda.net/patiented  Enter U420 in the search box to learn more about \"Sore Throat: Care Instructions.\"  Current as of: February 28, 2023               Content Version: 13.8    5328-1172 [employer].   Care instructions adapted under license by your healthcare professional. If you have questions about a medical condition or this instruction, always ask " your healthcare professional. [employer] disclaims any warranty or liability for your use of this information.

## 2024-05-06 ENCOUNTER — TRANSFERRED RECORDS (OUTPATIENT)
Dept: MULTI SPECIALTY CLINIC | Facility: CLINIC | Age: 39
End: 2024-05-06

## 2024-05-06 LAB — RETINOPATHY: NORMAL

## 2024-06-07 DIAGNOSIS — E11.9 TYPE 2 DIABETES MELLITUS WITHOUT COMPLICATION, WITHOUT LONG-TERM CURRENT USE OF INSULIN (H): ICD-10-CM

## 2024-06-09 DIAGNOSIS — E11.9 TYPE 2 DIABETES MELLITUS WITHOUT COMPLICATION, WITHOUT LONG-TERM CURRENT USE OF INSULIN (H): ICD-10-CM

## 2024-06-09 DIAGNOSIS — E78.5 HYPERLIPIDEMIA LDL GOAL <100: ICD-10-CM

## 2024-06-09 DIAGNOSIS — I10 BENIGN ESSENTIAL HYPERTENSION: ICD-10-CM

## 2024-06-10 ENCOUNTER — LAB (OUTPATIENT)
Dept: LAB | Facility: CLINIC | Age: 39
End: 2024-06-10
Payer: COMMERCIAL

## 2024-06-10 DIAGNOSIS — E78.5 HYPERLIPIDEMIA LDL GOAL <100: ICD-10-CM

## 2024-06-10 DIAGNOSIS — R53.83 FATIGUE, UNSPECIFIED TYPE: ICD-10-CM

## 2024-06-10 DIAGNOSIS — E11.9 TYPE 2 DIABETES MELLITUS (H): Primary | ICD-10-CM

## 2024-06-10 DIAGNOSIS — E11.9 TYPE 2 DIABETES MELLITUS WITHOUT COMPLICATION, WITHOUT LONG-TERM CURRENT USE OF INSULIN (H): ICD-10-CM

## 2024-06-10 DIAGNOSIS — E29.1 HYPOGONADISM MALE: ICD-10-CM

## 2024-06-10 DIAGNOSIS — I10 BENIGN ESSENTIAL HYPERTENSION: ICD-10-CM

## 2024-06-10 LAB
ALBUMIN SERPL BCG-MCNC: 4.6 G/DL (ref 3.5–5.2)
ANION GAP SERPL CALCULATED.3IONS-SCNC: 11 MMOL/L (ref 7–15)
BUN SERPL-MCNC: 11.4 MG/DL (ref 6–20)
CALCIUM SERPL-MCNC: 9.6 MG/DL (ref 8.6–10)
CHLORIDE SERPL-SCNC: 103 MMOL/L (ref 98–107)
CHOLEST SERPL-MCNC: 181 MG/DL
CREAT SERPL-MCNC: 0.76 MG/DL (ref 0.67–1.17)
DEPRECATED HCO3 PLAS-SCNC: 27 MMOL/L (ref 22–29)
EGFRCR SERPLBLD CKD-EPI 2021: >90 ML/MIN/1.73M2
FASTING STATUS PATIENT QL REPORTED: ABNORMAL
GLUCOSE SERPL-MCNC: 147 MG/DL (ref 70–99)
HBA1C MFR BLD: 7.2 % (ref 0–5.6)
HDLC SERPL-MCNC: 36 MG/DL
LDLC SERPL CALC-MCNC: 103 MG/DL
NONHDLC SERPL-MCNC: 145 MG/DL
PHOSPHATE SERPL-MCNC: 4.2 MG/DL (ref 2.5–4.5)
POTASSIUM SERPL-SCNC: 3.9 MMOL/L (ref 3.4–5.3)
SHBG SERPL-SCNC: 17 NMOL/L (ref 11–80)
SODIUM SERPL-SCNC: 141 MMOL/L (ref 135–145)
TRIGL SERPL-MCNC: 209 MG/DL

## 2024-06-10 PROCEDURE — 80069 RENAL FUNCTION PANEL: CPT

## 2024-06-10 PROCEDURE — 84403 ASSAY OF TOTAL TESTOSTERONE: CPT

## 2024-06-10 PROCEDURE — 36415 COLL VENOUS BLD VENIPUNCTURE: CPT

## 2024-06-10 PROCEDURE — 80061 LIPID PANEL: CPT

## 2024-06-10 PROCEDURE — 83036 HEMOGLOBIN GLYCOSYLATED A1C: CPT

## 2024-06-10 PROCEDURE — 82043 UR ALBUMIN QUANTITATIVE: CPT

## 2024-06-10 PROCEDURE — 82570 ASSAY OF URINE CREATININE: CPT

## 2024-06-10 PROCEDURE — 84270 ASSAY OF SEX HORMONE GLOBUL: CPT

## 2024-06-10 RX ORDER — ATORVASTATIN CALCIUM 20 MG/1
20 TABLET, FILM COATED ORAL DAILY
Qty: 90 TABLET | Refills: 1 | Status: SHIPPED | OUTPATIENT
Start: 2024-06-10 | End: 2024-08-08

## 2024-06-10 RX ORDER — METFORMIN HCL 500 MG
TABLET, EXTENDED RELEASE 24 HR ORAL
Qty: 180 TABLET | Refills: 1 | Status: SHIPPED | OUTPATIENT
Start: 2024-06-10 | End: 2024-08-08

## 2024-06-10 RX ORDER — SEMAGLUTIDE 1.34 MG/ML
INJECTION, SOLUTION SUBCUTANEOUS
Qty: 9 ML | Refills: 1 | Status: SHIPPED | OUTPATIENT
Start: 2024-06-10 | End: 2024-06-11

## 2024-06-10 RX ORDER — HYDROCHLOROTHIAZIDE 25 MG/1
25 TABLET ORAL DAILY
Qty: 90 TABLET | Refills: 1 | Status: SHIPPED | OUTPATIENT
Start: 2024-06-10 | End: 2024-08-08

## 2024-06-10 RX ORDER — GLIPIZIDE 10 MG/1
10 TABLET, FILM COATED, EXTENDED RELEASE ORAL DAILY
Qty: 90 TABLET | Refills: 1 | Status: SHIPPED | OUTPATIENT
Start: 2024-06-10 | End: 2024-08-08

## 2024-06-11 ENCOUNTER — MYC REFILL (OUTPATIENT)
Dept: FAMILY MEDICINE | Facility: CLINIC | Age: 39
End: 2024-06-11
Payer: COMMERCIAL

## 2024-06-11 DIAGNOSIS — J45.40 MODERATE PERSISTENT ASTHMA WITHOUT COMPLICATION: ICD-10-CM

## 2024-06-11 DIAGNOSIS — E11.9 TYPE 2 DIABETES MELLITUS WITHOUT COMPLICATION, WITHOUT LONG-TERM CURRENT USE OF INSULIN (H): ICD-10-CM

## 2024-06-11 LAB
CREAT UR-MCNC: 195 MG/DL
MICROALBUMIN UR-MCNC: 16.3 MG/L
MICROALBUMIN/CREAT UR: 8.36 MG/G CR (ref 0–17)

## 2024-06-11 RX ORDER — LEVALBUTEROL TARTRATE 45 UG/1
AEROSOL, METERED ORAL
Qty: 45 G | Refills: 1 | Status: SHIPPED | OUTPATIENT
Start: 2024-06-11 | End: 2024-08-08

## 2024-06-11 RX ORDER — SEMAGLUTIDE 1.34 MG/ML
INJECTION, SOLUTION SUBCUTANEOUS
Qty: 9 ML | Refills: 1 | Status: SHIPPED | OUTPATIENT
Start: 2024-06-11 | End: 2024-08-08

## 2024-06-12 ENCOUNTER — MYC REFILL (OUTPATIENT)
Dept: FAMILY MEDICINE | Facility: CLINIC | Age: 39
End: 2024-06-12
Payer: COMMERCIAL

## 2024-06-12 DIAGNOSIS — R79.89 LOW VITAMIN D LEVEL: ICD-10-CM

## 2024-06-12 DIAGNOSIS — R53.83 FATIGUE, UNSPECIFIED TYPE: ICD-10-CM

## 2024-06-12 DIAGNOSIS — J45.40 MODERATE PERSISTENT ASTHMA WITHOUT COMPLICATION: ICD-10-CM

## 2024-06-12 RX ORDER — CHOLECALCIFEROL (VITAMIN D3) 1250 MCG
CAPSULE ORAL
Qty: 7 CAPSULE | Refills: 0 | Status: SHIPPED | OUTPATIENT
Start: 2024-06-12

## 2024-06-12 RX ORDER — LEVALBUTEROL TARTRATE 45 UG/1
AEROSOL, METERED ORAL
Qty: 45 G | Refills: 1 | OUTPATIENT
Start: 2024-06-12

## 2024-06-13 LAB
TESTOST FREE SERPL-MCNC: 5.92 NG/DL
TESTOST SERPL-MCNC: 222 NG/DL (ref 240–950)

## 2024-06-14 NOTE — RESULT ENCOUNTER NOTE
Rafy Kinsey,    Thank you for your recent office visit.    Here are your recent results.  Your testosterone still is low with the testosterone replacement, this could be dependent on when you are giving yourself your medication and when you are having testing done.  Your glucose is high, normal kidney function.  Your A1c has improved.  Continue your current medications, diet changes, work on daily exercise and weight loss.  Continue your cholesterol medication.    Feel free to contact me via Evince or call the clinic at [phone].    Sincerely,    MARLY Dooley, FNP-BC

## 2024-06-17 ENCOUNTER — TELEPHONE (OUTPATIENT)
Dept: FAMILY MEDICINE | Facility: CLINIC | Age: 39
End: 2024-06-17
Payer: COMMERCIAL

## 2024-06-17 NOTE — TELEPHONE ENCOUNTER
Patient Quality Outreach    Patient is due for the following:   Diabetes -  Eye Exam and Foot Exam  Asthma  -  ACT needed  Hypertension -  BP check and Hypertension follow-up visit  Physical Preventive Adult Physical      Topic Date Due    Hepatitis B Vaccine (1 of 3 - 19+ 3-dose series) Never done    Pneumococcal Vaccine (2 of 2 - PCV) 01/30/2016    Diptheria Tetanus Pertussis (DTAP/TDAP/TD) Vaccine (3 - Td or Tdap) 07/30/2022    COVID-19 Vaccine (2 - 2023-24 season) 09/01/2023   Type of outreach:    Sent [employer] message.  Questions for provider review:  None       Altagracia Ruelas MA  Chart routed to Care Team.

## 2024-07-06 ENCOUNTER — HEALTH MAINTENANCE LETTER (OUTPATIENT)
Age: 39
End: 2024-07-06

## 2024-07-14 ENCOUNTER — MYC MEDICAL ADVICE (OUTPATIENT)
Dept: FAMILY MEDICINE | Facility: CLINIC | Age: 39
End: 2024-07-14
Payer: COMMERCIAL

## 2024-07-17 ENCOUNTER — TELEPHONE (OUTPATIENT)
Dept: FAMILY MEDICINE | Facility: CLINIC | Age: 39
End: 2024-07-17
Payer: COMMERCIAL

## 2024-07-17 NOTE — TELEPHONE ENCOUNTER
Patient Quality Outreach    Patient is due for the following:   Hypertension -  BP check   Diabetes -  A1C, LDL (Fasting), Eye Exam, and Foot Exam  Asthma  -  ACT needed and AAP  Physical Preventive Adult Physical      Topic Date Due    Hepatitis B Vaccine (1 of 3 - 19+ 3-dose series) Never done    Pneumococcal Vaccine (2 of 2 - PCV) 01/30/2016    Diptheria Tetanus Pertussis (DTAP/TDAP/TD) Vaccine (3 - Td or Tdap) 07/30/2022    COVID-19 Vaccine (2 - 2023-24 season) 09/01/2023   Type of outreach:    Sent [employer] message.  Questions for provider review:  None       Altagracia Ruelas MA  Chart routed to Care Team.

## 2024-08-06 ENCOUNTER — MYC MEDICAL ADVICE (OUTPATIENT)
Dept: FAMILY MEDICINE | Facility: CLINIC | Age: 39
End: 2024-08-06
Payer: COMMERCIAL

## 2024-08-06 NOTE — TELEPHONE ENCOUNTER
Patient could have the 10 AM spot on Thursday if still available.  See patient message MARLY Dooley, FNP-BC

## 2024-08-08 ENCOUNTER — OFFICE VISIT (OUTPATIENT)
Dept: FAMILY MEDICINE | Facility: CLINIC | Age: 39
End: 2024-08-08
Payer: COMMERCIAL

## 2024-08-08 VITALS
WEIGHT: 204.2 LBS | SYSTOLIC BLOOD PRESSURE: 138 MMHG | OXYGEN SATURATION: 97 % | DIASTOLIC BLOOD PRESSURE: 78 MMHG | TEMPERATURE: 97.3 F | HEIGHT: 70 IN | BODY MASS INDEX: 29.23 KG/M2 | RESPIRATION RATE: 18 BRPM | HEART RATE: 94 BPM

## 2024-08-08 DIAGNOSIS — J45.40 MODERATE PERSISTENT ASTHMA WITHOUT COMPLICATION: ICD-10-CM

## 2024-08-08 DIAGNOSIS — I10 BENIGN ESSENTIAL HYPERTENSION: ICD-10-CM

## 2024-08-08 DIAGNOSIS — R53.83 FATIGUE, UNSPECIFIED TYPE: Primary | ICD-10-CM

## 2024-08-08 DIAGNOSIS — R68.82 DECREASED SEX DRIVE: ICD-10-CM

## 2024-08-08 DIAGNOSIS — E11.9 TYPE 2 DIABETES MELLITUS WITHOUT COMPLICATION, WITHOUT LONG-TERM CURRENT USE OF INSULIN (H): ICD-10-CM

## 2024-08-08 DIAGNOSIS — E78.5 HYPERLIPIDEMIA LDL GOAL <100: ICD-10-CM

## 2024-08-08 DIAGNOSIS — E29.1 HYPOGONADISM MALE: ICD-10-CM

## 2024-08-08 PROCEDURE — G2211 COMPLEX E/M VISIT ADD ON: HCPCS | Performed by: NURSE PRACTITIONER

## 2024-08-08 PROCEDURE — 99214 OFFICE O/P EST MOD 30 MIN: CPT | Performed by: NURSE PRACTITIONER

## 2024-08-08 RX ORDER — LEVALBUTEROL TARTRATE 45 UG/1
AEROSOL, METERED ORAL
Qty: 45 G | Refills: 3 | Status: SHIPPED | OUTPATIENT
Start: 2024-08-08

## 2024-08-08 RX ORDER — SEMAGLUTIDE 1.34 MG/ML
INJECTION, SOLUTION SUBCUTANEOUS
Qty: 9 ML | Refills: 3 | Status: SHIPPED | OUTPATIENT
Start: 2024-08-08

## 2024-08-08 RX ORDER — AMLODIPINE BESYLATE 2.5 MG/1
2.5 TABLET ORAL DAILY
Qty: 90 TABLET | Refills: 3 | Status: SHIPPED | OUTPATIENT
Start: 2024-08-08

## 2024-08-08 RX ORDER — TESTOSTERONE GEL, 1% 10 MG/G
50 GEL TRANSDERMAL DAILY
Qty: 150 G | Refills: 5 | Status: SHIPPED | OUTPATIENT
Start: 2024-08-08

## 2024-08-08 RX ORDER — ATORVASTATIN CALCIUM 20 MG/1
20 TABLET, FILM COATED ORAL DAILY
Qty: 90 TABLET | Refills: 3 | Status: SHIPPED | OUTPATIENT
Start: 2024-08-08

## 2024-08-08 RX ORDER — GLIPIZIDE 10 MG/1
10 TABLET, FILM COATED, EXTENDED RELEASE ORAL DAILY
Qty: 90 TABLET | Refills: 3 | Status: SHIPPED | OUTPATIENT
Start: 2024-08-08

## 2024-08-08 RX ORDER — FLUTICASONE PROPIONATE AND SALMETEROL 100; 50 UG/1; UG/1
1 POWDER RESPIRATORY (INHALATION) EVERY 12 HOURS
Qty: 3 EACH | Refills: 3 | Status: SHIPPED | OUTPATIENT
Start: 2024-08-08

## 2024-08-08 RX ORDER — METFORMIN HCL 500 MG
TABLET, EXTENDED RELEASE 24 HR ORAL
Qty: 180 TABLET | Refills: 3 | Status: SHIPPED | OUTPATIENT
Start: 2024-08-08

## 2024-08-08 RX ORDER — HYDROCHLOROTHIAZIDE 25 MG/1
25 TABLET ORAL DAILY
Qty: 90 TABLET | Refills: 3 | Status: SHIPPED | OUTPATIENT
Start: 2024-08-08

## 2024-08-08 ASSESSMENT — ASTHMA QUESTIONNAIRES
QUESTION_4 LAST FOUR WEEKS HOW OFTEN HAVE YOU USED YOUR RESCUE INHALER OR NEBULIZER MEDICATION (SUCH AS ALBUTEROL): ONCE A WEEK OR LESS
QUESTION_2 LAST FOUR WEEKS HOW OFTEN HAVE YOU HAD SHORTNESS OF BREATH: ONCE OR TWICE A WEEK
QUESTION_1 LAST FOUR WEEKS HOW MUCH OF THE TIME DID YOUR ASTHMA KEEP YOU FROM GETTING AS MUCH DONE AT WORK, SCHOOL OR AT HOME: NONE OF THE TIME
ACT_TOTALSCORE: 22
ACT_TOTALSCORE: 22
QUESTION_3 LAST FOUR WEEKS HOW OFTEN DID YOUR ASTHMA SYMPTOMS (WHEEZING, COUGHING, SHORTNESS OF BREATH, CHEST TIGHTNESS OR PAIN) WAKE YOU UP AT NIGHT OR EARLIER THAN USUAL IN THE MORNING: NOT AT ALL
QUESTION_5 LAST FOUR WEEKS HOW WOULD YOU RATE YOUR ASTHMA CONTROL: WELL CONTROLLED

## 2024-08-08 NOTE — PROGRESS NOTES
"  Assessment & Plan     Benign essential hypertension    - amLODIPine (NORVASC) 2.5 MG tablet; Take 1 tablet (2.5 mg) by mouth daily  - hydrochlorothiazide (HYDRODIURIL) 25 MG tablet; Take 1 tablet (25 mg) by mouth daily    Hyperlipidemia LDL goal <100    - atorvastatin (LIPITOR) 20 MG tablet; Take 1 tablet (20 mg) by mouth daily    Moderate persistent asthma without complication    - fluticasone-salmeterol (ADVAIR DISKUS) 100-50 MCG/ACT inhaler; Inhale 1 puff into the lungs every 12 hours  - levalbuterol (XOPENEX HFA) 45 MCG/ACT inhaler; INHALE 1 TO 2 PUFFS BY MOUTH EVERY 4 HOURS AS NEEDED FOR SHORTNESS OF BREATH /DYSPNEA    Type 2 diabetes mellitus without complication, without long-term current use of insulin (H)    - glipiZIDE (GLUCOTROL XL) 10 MG 24 hr tablet; Take 1 tablet (10 mg) by mouth daily  - metFORMIN (GLUCOPHAGE XR) 500 MG 24 hr tablet; TAKE TWO TABLETS BY MOUTH EVERY DAY WITH SUPPER  - Semaglutide, 1 MG/DOSE, (OZEMPIC, 1 MG/DOSE,) 4 MG/3ML pen; INJECT 1MG UNDER THE SKIN EVERY 7 DAYS  - Adult Eye  Referral; Future    Fatigue, unspecified type    - testosterone (ANDROGEL/TESTIM) 50 MG/5GM (1%) topical gel; Place 1 packet (50 mg of testosterone) onto the skin daily Apply to the clean, dry intact skin of the shoulders, upper arms or abdomen.    Hypogonadism male    - testosterone (ANDROGEL/TESTIM) 50 MG/5GM (1%) topical gel; Place 1 packet (50 mg of testosterone) onto the skin daily Apply to the clean, dry intact skin of the shoulders, upper arms or abdomen.    Decreased sex drive    - testosterone (ANDROGEL/TESTIM) 50 MG/5GM (1%) topical gel; Place 1 packet (50 mg of testosterone) onto the skin daily Apply to the clean, dry intact skin of the shoulders, upper arms or abdomen.          BMI  Estimated body mass index is 29.4 kg/m  as calculated from the following:    Height as of this encounter: 1.775 m (5' 9.88\").    Weight as of this encounter: 92.6 kg (204 lb 3.2 oz).   Weight management plan: " Discussed healthy diet and exercise guidelines he has been exercising at the gym less often in the summer when he is playing softball most days of the week      Regular exercise  See Patient Instructions    Subjective   Tio is a 39 year old, presenting for the following health issues:  Medication Refill, Testosterone Concern, and Asthma    His mental health is doing well he has been playing softball a lot this summer.  His family recently went to [place] to visit family and they brought their daughter on a cruise.  His breathing has been doing really well overall, usually only flares with weather change cold to hot or hot to cold.  Inhalers work well.  He is a non-smoker.  He tries to check his blood sugars daily.  They are normally pretty good, he was out of medication for 2 weeks so he was only on Ozempic and had higher blood sugars.  Due for diabetic eye exam no vision changes.  He is tolerating his cholesterol medicine.  He does not check his blood pressures, he denies symptoms of chest pain, dizziness, frequent headaches.  He would like to try changing his testosterone to gel instead of injection.  Discussed controlled substance agreement.  Labs were done in June.  Declining vaccines.        8/8/2024     9:51 AM   Additional Questions   Roomed by Altagracia MILLER     Medication Refill    History of Present Illness     Asthma:  He presents for follow up of asthma.  He has no cough, no wheezing, and no shortness of breath.  He is using a relief medication a few times a week. He does not miss any doses of his controller medication throughout the week. Patient is aware of the following triggers: cold air. The patient has not had a visit to the Emergency Room, Urgent Care or Hospital due to asthma since the last clinic visit.     Diabetes:   He presents for follow up of diabetes.  He is checking home blood glucose one time daily.   He checks blood glucose before meals.  Blood glucose is sometimes over 200 and never under  "70. He is aware of hypoglycemia symptoms including shakiness and weakness.    He has no concerns regarding his diabetes at this time.   He is not experiencing numbness or burning in feet, excessive thirst, blurry vision, weight changes or redness, sores or blisters on feet. The patient has had a diabetic eye exam in the last 12 months. Eye exam performed on 03/03/2024. Location of last eye exam [place].        Reason for visit:  Annual and medication change for low test    He eats 0-1 servings of fruits and vegetables daily.He consumes 0 sweetened beverage(s) daily.He exercises with enough effort to increase his heart rate 20 to 29 minutes per day.  He exercises with enough effort to increase his heart rate 3 or less days per week.   He is taking medications regularly.           Review of Systems  Constitutional, HEENT, cardiovascular, pulmonary, GI, , musculoskeletal, neuro, skin, endocrine and psych systems are negative, except as otherwise noted.      Objective    /78   Pulse 94   Temp 97.3  F (36.3  C) (Temporal)   Resp 18   Ht 1.775 m (5' 9.88\")   Wt 92.6 kg (204 lb 3.2 oz)   SpO2 97%   BMI 29.40 kg/m    Body mass index is 29.4 kg/m .  Physical Exam   GENERAL: alert and no distress  EYES: Eyes grossly normal to inspection, PERRL and conjunctivae and sclerae normal  RESP: lungs clear to auscultation - no rales, rhonchi or wheezes  CV: regular rate and rhythm, normal S1 S2, no S3 or S4, no murmur, click or rub, no peripheral edema  MS: no cva tenderness  NEURO:mentation intact and speech normal  PSYCH: mentation appears normal, affect normal/bright  Diabetic foot exam: normal DP and PT pulses, no trophic changes or ulcerative lesions, and normal sensory exam    See orders      The longitudinal plan of care for the diagnosis(es)/condition(s) as documented were addressed during this visit. Due to the added complexity in care, I will continue to support Tio in the subsequent management and with " ongoing continuity of care.      Signed Electronically by: MORENO JOINER

## 2024-08-08 NOTE — LETTER

## 2024-08-08 NOTE — LETTER
My Asthma Action Plan    Name: Tio Merino   YOB: 1985  Date: 8/8/2024   My doctor: PETER GRAF NP   My clinic: [facility]        My Control Medicine: Fluticasone propionate + salmeterol (Advair Diskus or Wixela Inhub) -  100/50 mcg    My Rescue Medicine: Albuterol (Proair/Ventolin/Proventil HFA) 2-4 puffs EVERY 4 HOURS as needed. Use a spacer if recommended by your provider.   My Asthma Severity:   Moderate Persistent  Know your asthma triggers: smoke, upper respiratory infections, and cold air               GREEN ZONE   Good Control  I feel good  No cough or wheeze  Can work, sleep and play without asthma symptoms       Take your asthma control medicine every day.     If exercise triggers your asthma, take your rescue medication  15 minutes before exercise or sports, and  During exercise if you have asthma symptoms  Spacer to use with inhaler: If you have a spacer, make sure to use it with your inhaler             YELLOW ZONE Getting Worse  I have ANY of these:  I do not feel good  Cough or wheeze  Chest feels tight  Wake up at night   Keep taking your Green Zone medications  Start taking your rescue medicine:  every 20 minutes for up to 1 hour. Then every 4 hours for 24-48 hours.  If you stay in the Yellow Zone for more than 12-24 hours, contact your doctor.  If you do not return to the Green Zone in 12-24 hours or you get worse, start taking your oral steroid medicine if prescribed by your provider.           RED ZONE Medical Alert - Get Help  I have ANY of these:  I feel awful  Medicine is not helping  Breathing getting harder  Trouble walking or talking  Nose opens wide to breathe       Take your rescue medicine NOW  If your provider has prescribed an oral steroid medicine, start taking it NOW  Call your doctor NOW  If you are still in the Red Zone after 20 minutes and you have not reached your doctor:  Take your rescue medicine again and  Call 911 or go to  the emergency room right away    See your regular doctor within 2 weeks of an Emergency Room or Urgent Care visit for follow-up treatment.          Annual Reminders:  Meet with Asthma Educator,  Flu Shot in the Fall, consider Pneumonia Vaccination for patients with asthma (aged 19 and older).    Pharmacy[facility]    [id number]  [place], [place] - [address] [facility] [address] [facility] [address]    Electronically signed by PETER GRAF NP   Date: 08/08/24                      Asthma Triggers  How To Control Things That Make Your Asthma Worse    Triggers are things that make your asthma worse.  Look at the list below to help you find your triggers and what you can do about them.  You can help prevent asthma flare-ups by staying away from your triggers.      Trigger                                                          What you can do   Cigarette Smoke  Tobacco smoke can make asthma worse. Do not allow smoking in your home, car or around you.  Be sure no one smokes at a child s day care or school.  If you smoke, ask your health care provider for ways to help you quit.  Ask family members to quit too.  Ask your health care provider for a referral to Quit Plan to help you quit smoking, or call 5-621-270-PLAN.     Colds, Flu, Bronchitis  These are common triggers of asthma. Wash your hands often.  Don t touch your eyes, nose or mouth.  Get a flu shot every year.     Dust Mites  These are tiny bugs that live in cloth or carpet. They are too small to see. Wash sheets and blankets in hot water every week.   Encase pillows and mattress in dust mite proof covers.  Avoid having carpet if you can. If you have carpet, vacuum weekly.   Use a dust mask and HEPA vacuum.   Pollen and Outdoor Mold  Some people are allergic to trees, grass, or weed pollen, or molds. Try to keep your windows closed.  Limit time out doors when pollen count is high.   Ask you health care provider  about taking medicine during allergy season.     Animal Dander  Some people are allergic to skin flakes, urine or saliva from pets with fur or feathers. Keep pets with fur or feathers out of your home.    If you can t keep the pet outdoors, then keep the pet out of your bedroom.  Keep the bedroom door closed.  Keep pets off cloth furniture and away from stuffed toys.     Mice, Rats, and Cockroaches   Some people are allergic to the waste from these pests.   Cover food and garbage.  Clean up spills and food crumbs.  Store grease in the refrigerator.   Keep food out of the bedroom.   Indoor Mold  This can be a trigger if your home has high moisture. Fix leaking faucets, pipes, or other sources of water.   Clean moldy surfaces.  Dehumidify basement if it is damp and smelly.   Smoke, Strong Odors, and Sprays  These can reduce air quality. Stay away from strong odors and sprays, such as perfume, powder, hair spray, paints, smoke incense, paint, cleaning products, candles and new carpet.   Exercise or Sports  Some people with asthma have this trigger. Be active!  Ask your doctor about taking medicine before sports or exercise to prevent symptoms.    Warm up for 5-10 minutes before and after sports or exercise.     Other Triggers of Asthma  Cold air:  Cover your nose and mouth with a scarf.  Sometimes laughing or crying can be a trigger.  Some medicines and food can trigger asthma.

## 2024-08-16 ENCOUNTER — TELEPHONE (OUTPATIENT)
Dept: FAMILY MEDICINE | Facility: CLINIC | Age: 39
End: 2024-08-16

## 2024-08-16 NOTE — TELEPHONE ENCOUNTER
Patient Quality Outreach    Patient is due for the following:   Diabetes -  A1C, LDL (Fasting), and Eye Exam  Asthma  -  ACT needed  Physical Preventive Adult Physical      Topic Date Due    Hepatitis B Vaccine (1 of 3 - 19+ 3-dose series) Never done    Pneumococcal Vaccine (2 of 2 - PCV) 01/30/2016    Diptheria Tetanus Pertussis (DTAP/TDAP/TD) Vaccine (3 - Td or Tdap) 07/30/2022    COVID-19 Vaccine (2 - 2023-24 season) 09/01/2023   Type of outreach:    Sent [employer] message.  Questions for provider review:  None       Altagracia Ruelas MA  Chart routed to Care Team.

## 2024-09-14 ENCOUNTER — HEALTH MAINTENANCE LETTER (OUTPATIENT)
Age: 39
End: 2024-09-14

## 2024-09-30 ENCOUNTER — OFFICE VISIT (OUTPATIENT)
Dept: FAMILY MEDICINE | Facility: CLINIC | Age: 39
End: 2024-09-30
Payer: COMMERCIAL

## 2024-09-30 VITALS
HEART RATE: 85 BPM | BODY MASS INDEX: 28.94 KG/M2 | SYSTOLIC BLOOD PRESSURE: 148 MMHG | WEIGHT: 201 LBS | DIASTOLIC BLOOD PRESSURE: 98 MMHG | RESPIRATION RATE: 16 BRPM | OXYGEN SATURATION: 97 % | TEMPERATURE: 98.4 F

## 2024-09-30 DIAGNOSIS — R79.89 LOW TESTOSTERONE IN MALE: ICD-10-CM

## 2024-09-30 DIAGNOSIS — I10 PRIMARY HYPERTENSION: ICD-10-CM

## 2024-09-30 DIAGNOSIS — R36.1 BLOOD IN SEMEN: Primary | ICD-10-CM

## 2024-09-30 LAB
ALBUMIN UR-MCNC: NEGATIVE MG/DL
APPEARANCE UR: CLEAR
BILIRUB UR QL STRIP: NEGATIVE
COLOR UR AUTO: YELLOW
GLUCOSE UR STRIP-MCNC: >=1000 MG/DL
HGB UR QL STRIP: NEGATIVE
KETONES UR STRIP-MCNC: ABNORMAL MG/DL
LEUKOCYTE ESTERASE UR QL STRIP: NEGATIVE
NITRATE UR QL: NEGATIVE
PH UR STRIP: 7 [PH] (ref 5–8)
SP GR UR STRIP: 1.02 (ref 1–1.03)
UROBILINOGEN UR STRIP-ACNC: 1 E.U./DL

## 2024-09-30 PROCEDURE — 99214 OFFICE O/P EST MOD 30 MIN: CPT | Performed by: FAMILY MEDICINE

## 2024-09-30 PROCEDURE — 81003 URINALYSIS AUTO W/O SCOPE: CPT | Performed by: FAMILY MEDICINE

## 2024-09-30 NOTE — PROGRESS NOTES
Assessment & Plan     Blood in semen  Suspect some urinary hesitancy and blood noted in the semen to be secondary to possible prostatitis-possibly secondary to the testosterone supplement  Would recommend discontinuing testosterone supplement over the next month  Would recommend urinalysis today to see if there is any signs of infection or blood if so we will treat with antibiotics  - UA Macroscopic with reflex to Microscopic and Culture - Clinic Collect    Primary hypertension  Blood pressure elevated  Possible secondary to testosterone use I would recommend stopping testosterone and rechecking blood pressure    Low testosterone in male  Patient with history of low testosterone previously on injectable form of testosterone most recently last couple months started on topical  Discussed with him some side effects testosterone and suspect his current symptoms are from its use I would recommend holding the supplement at this time                  Akilah Kinsey is a 39 year old, presenting for the following health issues:  Male  Problem (Noticing blood in semen, feeling of needing to urinate when lying down, delay in urine stream after lying down)  Patient presenting for concerns of noting blood in the semen in the last few weeks.  He has a sensation that he needs to urinate when he is lying down and some delay in urination when he goes.  In review of records he recently was started on testosterone a couple months ago discussed similar side effects of testosterone with the patient.  Suspect he could have prostatitis versus prostatitis secondary to testosterone supplement.  I recommend he stop the supplement at this time.  Will obtain a urine analysis today to see if there is any signs of infection or blood if so we will treat with antibiotics.  Either way I think a trial off the testosterone would be best.  Blood pressure is elevated today could also be secondary testosterone would have him hold the  testosterone recheck blood pressure with his primary.      9/30/2024    10:02 AM   Additional Questions   Roomed by Hansa THIBODEAUX CMA       Via the Health Maintenance questionnaire, the patient has reported the following services have been completed -Eye Exam: M view 2024-05-06, this information has been sent to the abstraction team.  History of Present Illness       Reason for visit:  Blood in semen.  Symptom onset:  3-7 days ago  Symptom intensity:  Moderate  Symptom progression:  Staying the same  Had these symptoms before:  No   He is taking medications regularly.                     Objective    BP (!) 148/98 (BP Location: Left arm, Patient Position: Sitting, Cuff Size: Adult Large)   Pulse 85   Temp 98.4  F (36.9  C) (Oral)   Resp 16   Wt 91.2 kg (201 lb)   SpO2 97%   BMI 28.94 kg/m    Body mass index is 28.94 kg/m .  Physical Exam   GENERAL: alert and no distress  EYES: Eyes grossly normal to inspection, PERRL and conjunctivae and sclerae normal  RESP: lungs clear to auscultation - no rales, rhonchi or wheezes  CV: regular rate and rhythm, normal S1 S2, no S3 or S4, no murmur, click or rub, no peripheral edema  MS: no gross musculoskeletal defects noted, no edema  NEURO: Normal strength and tone, mentation intact and speech normal  PSYCH: mentation appears normal, affect normal/bright            Signed Electronically by: Francois Martinez MD

## 2025-01-11 ENCOUNTER — HEALTH MAINTENANCE LETTER (OUTPATIENT)
Age: 40
End: 2025-01-11

## 2025-01-14 ENCOUNTER — LAB (OUTPATIENT)
Dept: LAB | Facility: CLINIC | Age: 40
End: 2025-01-14
Payer: COMMERCIAL

## 2025-01-14 ENCOUNTER — VIRTUAL VISIT (OUTPATIENT)
Dept: URGENT CARE | Facility: CLINIC | Age: 40
End: 2025-01-14
Payer: COMMERCIAL

## 2025-01-14 DIAGNOSIS — J02.9 PHARYNGITIS, UNSPECIFIED ETIOLOGY: Primary | ICD-10-CM

## 2025-01-14 DIAGNOSIS — J02.9 PHARYNGITIS, UNSPECIFIED ETIOLOGY: ICD-10-CM

## 2025-01-14 LAB
DEPRECATED S PYO AG THROAT QL EIA: NEGATIVE
FLUAV AG SPEC QL IA: NEGATIVE
FLUBV AG SPEC QL IA: NEGATIVE
S PYO DNA THROAT QL NAA+PROBE: NOT DETECTED

## 2025-01-14 PROCEDURE — 98005 SYNCH AUDIO-VIDEO EST LOW 20: CPT | Performed by: EMERGENCY MEDICINE

## 2025-01-14 PROCEDURE — 87651 STREP A DNA AMP PROBE: CPT

## 2025-01-14 PROCEDURE — 87804 INFLUENZA ASSAY W/OPTIC: CPT | Performed by: EMERGENCY MEDICINE

## 2025-01-14 NOTE — PROGRESS NOTES
"Video visit:  Start time: 1:30 PM  Stop time: 1:36 PM  Duration: 6 minutes  Patient location: At home  Provider location: [facility] virtual provider (remote).  Platform used for video visit: Sandoval        CHIEF COMPLAINT: Sore throat, body aches and chills      HPI: Patient is a 39-year-old male who began having symptoms last night of cough, sore throat and chills.  He has had bodyaches.  Tmax of 99.7.  Did not do COVID testing but feels different to him.  No shortness of breath.  No obvious exposure that he is aware of.      ROS: See HPI otherwise normal.    Allergies   Allergen Reactions    Aspirin Shortness Of Breath    Lisinopril Cough      Current Outpatient Medications   Medication Sig Dispense Refill    amLODIPine (NORVASC) 2.5 MG tablet Take 1 tablet (2.5 mg) by mouth daily 90 tablet 3    atorvastatin (LIPITOR) 20 MG tablet Take 1 tablet (20 mg) by mouth daily 90 tablet 3    cholecalciferol (VITAMIN D3) 1250 mcg (71459 units) capsule TAKE 1 CAPSULE EVERY 7 DAYS 7 capsule 0    fluticasone-salmeterol (ADVAIR DISKUS) 100-50 MCG/ACT inhaler Inhale 1 puff into the lungs every 12 hours 3 each 3    glipiZIDE (GLUCOTROL XL) 10 MG 24 hr tablet Take 1 tablet (10 mg) by mouth daily 90 tablet 3    hydrochlorothiazide (HYDRODIURIL) 25 MG tablet Take 1 tablet (25 mg) by mouth daily 90 tablet 3    levalbuterol (XOPENEX HFA) 45 MCG/ACT inhaler INHALE 1 TO 2 PUFFS BY MOUTH EVERY 4 HOURS AS NEEDED FOR SHORTNESS OF BREATH /DYSPNEA 45 g 3    metFORMIN (GLUCOPHAGE XR) 500 MG 24 hr tablet TAKE TWO TABLETS BY MOUTH EVERY DAY WITH SUPPER 180 tablet 3    Needle, Disp, 22G X 1\" MISC 1 Needle once a week 100 each 0    Semaglutide, 1 MG/DOSE, (OZEMPIC, 1 MG/DOSE,) 4 MG/3ML pen INJECT 1MG UNDER THE SKIN EVERY 7 DAYS 9 mL 3    testosterone (ANDROGEL/TESTIM) 50 MG/5GM (1%) topical gel Place 1 packet (50 mg of testosterone) onto the skin daily Apply to the clean, dry intact skin of the shoulders, upper arms or abdomen. 150 g 5 "         PE: No acute distress on video visit.  He is alert and oriented.  He is nondyspneic appearing speaking in full sentences.        TREATMENT: None.      ASSESSMENT: Probable viral syndrome but will rule out strep.  He is in no acute distress and stable for outpatient management.      DIAGNOSIS: Sore throat, myalgias      PLAN: Outpatient strep and influenza ordered.  Will monitor.  Tylenol, ibuprofen and plenty of fluids.  Be seen at any time any worsening symptoms and be seen in 5 to 7 days if continually ill.

## 2025-04-20 ENCOUNTER — HEALTH MAINTENANCE LETTER (OUTPATIENT)
Age: 40
End: 2025-04-20

## 2025-04-30 ENCOUNTER — MYC REFILL (OUTPATIENT)
Dept: FAMILY MEDICINE | Facility: CLINIC | Age: 40
End: 2025-04-30
Payer: COMMERCIAL

## 2025-04-30 DIAGNOSIS — E78.5 HYPERLIPIDEMIA LDL GOAL <100: ICD-10-CM

## 2025-04-30 DIAGNOSIS — E11.9 TYPE 2 DIABETES MELLITUS WITHOUT COMPLICATION, WITHOUT LONG-TERM CURRENT USE OF INSULIN (H): ICD-10-CM

## 2025-04-30 DIAGNOSIS — I10 BENIGN ESSENTIAL HYPERTENSION: ICD-10-CM

## 2025-04-30 RX ORDER — GLIPIZIDE 10 MG/1
10 TABLET, FILM COATED, EXTENDED RELEASE ORAL DAILY
Qty: 90 TABLET | Refills: 3 | OUTPATIENT
Start: 2025-04-30

## 2025-04-30 RX ORDER — HYDROCHLOROTHIAZIDE 25 MG/1
25 TABLET ORAL DAILY
Qty: 90 TABLET | Refills: 3 | OUTPATIENT
Start: 2025-04-30

## 2025-04-30 RX ORDER — ATORVASTATIN CALCIUM 20 MG/1
20 TABLET, FILM COATED ORAL DAILY
Qty: 90 TABLET | Refills: 3 | OUTPATIENT
Start: 2025-04-30

## 2025-05-13 ENCOUNTER — TELEPHONE (OUTPATIENT)
Dept: FAMILY MEDICINE | Facility: CLINIC | Age: 40
End: 2025-05-13
Payer: COMMERCIAL

## 2025-05-13 NOTE — TELEPHONE ENCOUNTER
Patient Quality Outreach    Patient is due for the following:   Diabetes -  A1C, LDL (Fasting), Eye Exam, Microalbumin, Statin, BP Check, Diabetic Follow-Up Visit, and Foot Exam  Asthma  -  ACT needed Asthma Control Test  Hypertension -  BP check and Hypertension follow-up visit  Physical Preventive Adult Physical      Topic Date Due    Hepatitis B Vaccine (1 of 3 - 19+ 3-dose series) Never done    Pneumococcal Vaccine (2 of 2 - PCV) 01/30/2016    Diptheria Tetanus Pertussis (DTAP/TDAP/TD) Vaccine (3 - Td or Tdap) 07/30/2022    COVID-19 Vaccine (2 - 2024-25 season) 09/01/2024   Type of outreach:    Sent [employer] message.  Questions for provider review:    None       Altagracia Ruelas MA  Chart routed to Care Team.

## 2025-07-13 ENCOUNTER — HEALTH MAINTENANCE LETTER (OUTPATIENT)
Age: 40
End: 2025-07-13

## 2025-08-03 ENCOUNTER — HEALTH MAINTENANCE LETTER (OUTPATIENT)
Age: 40
End: 2025-08-03

## 2025-08-15 DIAGNOSIS — E11.9 TYPE 2 DIABETES MELLITUS WITHOUT COMPLICATION, WITHOUT LONG-TERM CURRENT USE OF INSULIN (H): ICD-10-CM

## 2025-08-19 ASSESSMENT — ASTHMA QUESTIONNAIRES
QUESTION_5 LAST FOUR WEEKS HOW WOULD YOU RATE YOUR ASTHMA CONTROL: WELL CONTROLLED
QUESTION_4 LAST FOUR WEEKS HOW OFTEN HAVE YOU USED YOUR RESCUE INHALER OR NEBULIZER MEDICATION (SUCH AS ALBUTEROL): ONCE A WEEK OR LESS
QUESTION_2 LAST FOUR WEEKS HOW OFTEN HAVE YOU HAD SHORTNESS OF BREATH: ONCE OR TWICE A WEEK
ACT_TOTALSCORE: 22
QUESTION_3 LAST FOUR WEEKS HOW OFTEN DID YOUR ASTHMA SYMPTOMS (WHEEZING, COUGHING, SHORTNESS OF BREATH, CHEST TIGHTNESS OR PAIN) WAKE YOU UP AT NIGHT OR EARLIER THAN USUAL IN THE MORNING: NOT AT ALL
QUESTION_1 LAST FOUR WEEKS HOW MUCH OF THE TIME DID YOUR ASTHMA KEEP YOU FROM GETTING AS MUCH DONE AT WORK, SCHOOL OR AT HOME: NONE OF THE TIME

## 2025-08-20 ENCOUNTER — TELEPHONE (OUTPATIENT)
Dept: FAMILY MEDICINE | Facility: CLINIC | Age: 40
End: 2025-08-20

## 2025-08-20 ENCOUNTER — VIRTUAL VISIT (OUTPATIENT)
Dept: FAMILY MEDICINE | Facility: CLINIC | Age: 40
End: 2025-08-20
Payer: COMMERCIAL

## 2025-08-20 DIAGNOSIS — I10 BENIGN ESSENTIAL HYPERTENSION: ICD-10-CM

## 2025-08-20 DIAGNOSIS — R53.83 FATIGUE, UNSPECIFIED TYPE: ICD-10-CM

## 2025-08-20 DIAGNOSIS — E78.5 HYPERLIPIDEMIA LDL GOAL <100: ICD-10-CM

## 2025-08-20 DIAGNOSIS — R68.82 DECREASED SEX DRIVE: ICD-10-CM

## 2025-08-20 DIAGNOSIS — J45.40 MODERATE PERSISTENT ASTHMA WITHOUT COMPLICATION: ICD-10-CM

## 2025-08-20 DIAGNOSIS — E11.9 TYPE 2 DIABETES MELLITUS WITHOUT COMPLICATION, WITHOUT LONG-TERM CURRENT USE OF INSULIN (H): ICD-10-CM

## 2025-08-20 DIAGNOSIS — E29.1 HYPOGONADISM MALE: ICD-10-CM

## 2025-08-20 PROCEDURE — 98006 SYNCH AUDIO-VIDEO EST MOD 30: CPT | Performed by: NURSE PRACTITIONER

## 2025-08-20 RX ORDER — TESTOSTERONE GEL, 1% 10 MG/G
50 GEL TRANSDERMAL DAILY
Qty: 150 G | Refills: 5 | Status: SHIPPED | OUTPATIENT
Start: 2025-08-20

## 2025-08-20 RX ORDER — HYDROCHLOROTHIAZIDE 25 MG/1
25 TABLET ORAL DAILY
Qty: 90 TABLET | Refills: 3 | Status: SHIPPED | OUTPATIENT
Start: 2025-08-20

## 2025-08-20 RX ORDER — GLIPIZIDE 10 MG/1
10 TABLET, FILM COATED, EXTENDED RELEASE ORAL DAILY
Qty: 90 TABLET | Refills: 3 | Status: SHIPPED | OUTPATIENT
Start: 2025-08-20

## 2025-08-20 RX ORDER — AMLODIPINE BESYLATE 2.5 MG/1
2.5 TABLET ORAL DAILY
Qty: 90 TABLET | Refills: 3 | Status: SHIPPED | OUTPATIENT
Start: 2025-08-20

## 2025-08-20 RX ORDER — FLUTICASONE PROPIONATE AND SALMETEROL 100; 50 UG/1; UG/1
1 POWDER RESPIRATORY (INHALATION) EVERY 12 HOURS
Qty: 3 EACH | Refills: 3 | Status: SHIPPED | OUTPATIENT
Start: 2025-08-20

## 2025-08-20 RX ORDER — ATORVASTATIN CALCIUM 20 MG/1
20 TABLET, FILM COATED ORAL DAILY
Qty: 90 TABLET | Refills: 3 | Status: SHIPPED | OUTPATIENT
Start: 2025-08-20

## 2025-08-20 RX ORDER — METFORMIN HYDROCHLORIDE 500 MG/1
TABLET, EXTENDED RELEASE ORAL
Qty: 180 TABLET | Refills: 3 | Status: SHIPPED | OUTPATIENT
Start: 2025-08-20

## 2025-08-20 RX ORDER — SEMAGLUTIDE 1.34 MG/ML
INJECTION, SOLUTION SUBCUTANEOUS
Qty: 9 ML | Refills: 3 | OUTPATIENT
Start: 2025-08-20

## 2025-08-21 ENCOUNTER — LAB (OUTPATIENT)
Dept: LAB | Facility: CLINIC | Age: 40
End: 2025-08-21
Payer: COMMERCIAL

## 2025-08-21 DIAGNOSIS — E11.9 TYPE 2 DIABETES MELLITUS (H): Primary | ICD-10-CM

## 2025-08-21 DIAGNOSIS — I10 BENIGN ESSENTIAL HYPERTENSION: ICD-10-CM

## 2025-08-21 DIAGNOSIS — E29.1 HYPOGONADISM MALE: ICD-10-CM

## 2025-08-21 DIAGNOSIS — R68.82 DECREASED SEX DRIVE: ICD-10-CM

## 2025-08-21 DIAGNOSIS — R53.83 FATIGUE, UNSPECIFIED TYPE: ICD-10-CM

## 2025-08-21 DIAGNOSIS — E78.5 HYPERLIPIDEMIA LDL GOAL <100: ICD-10-CM

## 2025-08-21 DIAGNOSIS — E11.9 TYPE 2 DIABETES MELLITUS WITHOUT COMPLICATION, WITHOUT LONG-TERM CURRENT USE OF INSULIN (H): ICD-10-CM

## 2025-08-21 LAB
ALBUMIN SERPL BCG-MCNC: 4.3 G/DL (ref 3.5–5.2)
ANION GAP SERPL CALCULATED.3IONS-SCNC: 12 MMOL/L (ref 7–15)
BUN SERPL-MCNC: 15.4 MG/DL (ref 6–20)
CALCIUM SERPL-MCNC: 9.6 MG/DL (ref 8.8–10.4)
CHLORIDE SERPL-SCNC: 102 MMOL/L (ref 98–107)
CHOLEST SERPL-MCNC: 176 MG/DL
CREAT SERPL-MCNC: 0.74 MG/DL (ref 0.67–1.17)
CREAT UR-MCNC: 317 MG/DL
EGFRCR SERPLBLD CKD-EPI 2021: >90 ML/MIN/1.73M2
EST. AVERAGE GLUCOSE BLD GHB EST-MCNC: 200 MG/DL
FASTING STATUS PATIENT QL REPORTED: ABNORMAL
GLUCOSE SERPL-MCNC: 196 MG/DL (ref 70–99)
HBA1C MFR BLD: 8.6 % (ref 0–5.6)
HCO3 SERPL-SCNC: 27 MMOL/L (ref 22–29)
HDLC SERPL-MCNC: 36 MG/DL
LDLC SERPL CALC-MCNC: 108 MG/DL
MICROALBUMIN UR-MCNC: 23.2 MG/L
MICROALBUMIN/CREAT UR: 7.32 MG/G CR (ref 0–17)
NONHDLC SERPL-MCNC: 140 MG/DL
PHOSPHATE SERPL-MCNC: 4.6 MG/DL (ref 2.5–4.5)
POTASSIUM SERPL-SCNC: 4 MMOL/L (ref 3.4–5.3)
SODIUM SERPL-SCNC: 141 MMOL/L (ref 135–145)
TRIGL SERPL-MCNC: 161 MG/DL

## 2025-08-23 LAB — TESTOST SERPL-MCNC: 264 NG/DL (ref 240–950)

## (undated) RX ORDER — EPINEPHRINE 1 MG/ML(1)
AMPUL (ML) INJECTION
Status: DISPENSED
Start: 2020-01-10

## (undated) RX ORDER — LIDOCAINE HYDROCHLORIDE 10 MG/ML
INJECTION, SOLUTION EPIDURAL; INFILTRATION; INTRACAUDAL; PERINEURAL
Status: DISPENSED
Start: 2020-01-10